# Patient Record
Sex: FEMALE | Race: WHITE | NOT HISPANIC OR LATINO | URBAN - METROPOLITAN AREA
[De-identification: names, ages, dates, MRNs, and addresses within clinical notes are randomized per-mention and may not be internally consistent; named-entity substitution may affect disease eponyms.]

---

## 2017-01-11 ENCOUNTER — EMERGENCY (EMERGENCY)
Facility: HOSPITAL | Age: 32
LOS: 1 days | Discharge: HOME | End: 2017-01-11
Admitting: EMERGENCY MEDICINE
Payer: MEDICARE

## 2017-01-11 DIAGNOSIS — F10.129 ALCOHOL ABUSE WITH INTOXICATION, UNSPECIFIED: ICD-10-CM
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
## 2017-01-11 DIAGNOSIS — F17.200 NICOTINE DEPENDENCE, UNSPECIFIED, UNCOMPLICATED: ICD-10-CM

## 2017-01-11 PROCEDURE — 99284 EMERGENCY DEPT VISIT MOD MDM: CPT

## 2017-01-12 ENCOUNTER — INPATIENT (INPATIENT)
Facility: HOSPITAL | Age: 32
LOS: 10 days | Discharge: HOME | End: 2017-01-23
Attending: PSYCHIATRY & NEUROLOGY | Admitting: PSYCHIATRY & NEUROLOGY
Payer: MEDICARE

## 2017-01-12 VITALS
HEART RATE: 99 BPM | TEMPERATURE: 98 F | RESPIRATION RATE: 14 BRPM | DIASTOLIC BLOOD PRESSURE: 62 MMHG | OXYGEN SATURATION: 99 % | SYSTOLIC BLOOD PRESSURE: 100 MMHG

## 2017-01-12 NOTE — ED ADULT TRIAGE NOTE - CHIEF COMPLAINT QUOTE
tiagoa from [place] terminal found intoxicated and unresponsive , pt discharged from [place] ER today, arousable to tactile stimuli pmh- depression, [facility] , pt discharged from [place] ER today, arousable to tactile stimuli pmh- depression, etoh abuse fingerstick 93

## 2017-01-12 NOTE — ED ADULT NURSE NOTE - CHIEF COMPLAINT QUOTE
[facility] , pt discharged from [place] ER today, arousable to tactile stimuli pmh- depression, etoh abuse fingerstick 93

## 2017-01-12 NOTE — ED PROVIDER NOTE - OBJECTIVE STATEMENT
32 yo F w/ hx of ETOH abuse and seizure disorder BIBEMS after being found outside 30 yo F w/ hx of ETOH abuse and seizure disorder BIBEMS [place] Patient was unresponsive initially, in ER now awake and alert. Reports she is suicidal, wants has had plans to run in front of traffic. Currently homeless

## 2017-01-12 NOTE — ED PROVIDER NOTE - ATTENDING CONTRIBUTION TO CARE
Attending note:   After face to face evaluation of this patient, I concur with above noted hx, pe, and care plan for this patient. +Etoh chronic abuse, seizure disorder, homeless 32 y/o F here with shaking and request for detox.   No meds for days.    Evaluation in progress

## 2017-01-12 NOTE — ED PROVIDER NOTE - PROGRESS NOTE DETAILS
Patient alert and awake, oriented x 3. Tolerated PO, ambulatory. Medically cleared. Still feels suicidal. Will send to .

## 2017-01-12 NOTE — ED ADULT NURSE REASSESSMENT NOTE - NS ED NURSE REASSESS COMMENT FT1
Pt respirations even and unlabored.  Will continue to monitor.
Pt awaiting assessment room to be readied.  Pt respirations even and unlabored.  Pt belongings placed in a large blue bag by PCA and placed at back of stretcher.  Will continue to monitor.

## 2017-01-12 NOTE — ED ADULT NURSE NOTE - OBJECTIVE STATEMENT
Pt received to room 11 on stretcher. Pt is sleeping, easily aroused with verbal stimuli, appears intoxicated, agitated, and anxious, breathing with ease on room air. Pt is 30 YO female with h/o PTSD, depression, ETOH abuse, and seizures c/o "not feeling good because she's hungry." Pt reports she is homeless and spends her days drinking. Last drink reported to be before she came to ER, reports drinking at least a pint of vodka/day. Reports she has a h/o seizures from alcohol withdrawal. VS recorded, comfort and safety measures provided. Pt received to room 11 on stretcher. Pt is sleeping, easily aroused with verbal stimuli, appears intoxicated, agitated, and anxious, breathing with ease on room air. Pt is 30 YO female with h/o PTSD, depression, ETOH abuse, and seizures c/o "not feeling good because she's hungry." Pt reports she is homeless and spends her days drinking. Last drink reported to be before she came to ER, reports drinking at least a pint of vodka/day. Reports SI. Reports she has a h/o seizures from alcohol withdrawal. Came in with IV access in right hand. VS recorded, comfort and safety measures provided.

## 2017-01-13 DIAGNOSIS — F33.9 MAJOR DEPRESSIVE DISORDER, RECURRENT, UNSPECIFIED: ICD-10-CM

## 2017-01-13 LAB
ALBUMIN SERPL ELPH-MCNC: 3.6 G/DL — SIGNIFICANT CHANGE UP (ref 3.3–5)
ALP SERPL-CCNC: 52 U/L — SIGNIFICANT CHANGE UP (ref 40–120)
ALT FLD-CCNC: 19 U/L — SIGNIFICANT CHANGE UP (ref 4–33)
AMPHET UR-MCNC: NEGATIVE — SIGNIFICANT CHANGE UP
APAP SERPL-MCNC: < 15 UG/ML — LOW (ref 15–25)
AST SERPL-CCNC: 24 U/L — SIGNIFICANT CHANGE UP (ref 4–32)
BARBITURATES MEASUREMENT: NEGATIVE — SIGNIFICANT CHANGE UP
BARBITURATES UR SCN-MCNC: NEGATIVE — SIGNIFICANT CHANGE UP
BASOPHILS # BLD AUTO: 0.02 K/UL — SIGNIFICANT CHANGE UP (ref 0–0.2)
BASOPHILS NFR BLD AUTO: 0.4 % — SIGNIFICANT CHANGE UP (ref 0–2)
BENZODIAZ SERPL-MCNC: NEGATIVE — SIGNIFICANT CHANGE UP
BENZODIAZ UR-MCNC: NEGATIVE — SIGNIFICANT CHANGE UP
BILIRUB SERPL-MCNC: < 0.2 MG/DL — LOW (ref 0.2–1.2)
BUN SERPL-MCNC: 14 MG/DL — SIGNIFICANT CHANGE UP (ref 7–23)
CALCIUM SERPL-MCNC: 7.6 MG/DL — LOW (ref 8.4–10.5)
CANNABINOIDS UR-MCNC: NEGATIVE — SIGNIFICANT CHANGE UP
CHLORIDE SERPL-SCNC: 107 MMOL/L — SIGNIFICANT CHANGE UP (ref 98–107)
CO2 SERPL-SCNC: 22 MMOL/L — SIGNIFICANT CHANGE UP (ref 22–31)
COCAINE METAB.OTHER UR-MCNC: NEGATIVE — SIGNIFICANT CHANGE UP
CREAT SERPL-MCNC: 0.44 MG/DL — LOW (ref 0.5–1.3)
EOSINOPHIL # BLD AUTO: 0.2 K/UL — SIGNIFICANT CHANGE UP (ref 0–0.5)
EOSINOPHIL NFR BLD AUTO: 3.7 % — SIGNIFICANT CHANGE UP (ref 0–6)
ETHANOL BLD-MCNC: 214 MG/DL — HIGH
GLUCOSE SERPL-MCNC: 85 MG/DL — SIGNIFICANT CHANGE UP (ref 70–99)
HCG SERPL-ACNC: < 5 MIU/ML — SIGNIFICANT CHANGE UP
HCT VFR BLD CALC: 32.3 % — LOW (ref 34.5–45)
HGB BLD-MCNC: 9.9 G/DL — LOW (ref 11.5–15.5)
IMM GRANULOCYTES NFR BLD AUTO: 0.2 % — SIGNIFICANT CHANGE UP (ref 0–1.5)
LYMPHOCYTES # BLD AUTO: 2.74 K/UL — SIGNIFICANT CHANGE UP (ref 1–3.3)
LYMPHOCYTES # BLD AUTO: 50.7 % — HIGH (ref 13–44)
MCHC RBC-ENTMCNC: 27.5 PG — SIGNIFICANT CHANGE UP (ref 27–34)
MCHC RBC-ENTMCNC: 30.7 % — LOW (ref 32–36)
MCV RBC AUTO: 89.7 FL — SIGNIFICANT CHANGE UP (ref 80–100)
METHADONE UR-MCNC: NEGATIVE — SIGNIFICANT CHANGE UP
MONOCYTES # BLD AUTO: 0.5 K/UL — SIGNIFICANT CHANGE UP (ref 0–0.9)
MONOCYTES NFR BLD AUTO: 9.3 % — SIGNIFICANT CHANGE UP (ref 2–14)
NEUTROPHILS # BLD AUTO: 1.93 K/UL — SIGNIFICANT CHANGE UP (ref 1.8–7.4)
NEUTROPHILS NFR BLD AUTO: 35.7 % — LOW (ref 43–77)
OPIATES UR-MCNC: NEGATIVE — SIGNIFICANT CHANGE UP
OXYCODONE UR-MCNC: NEGATIVE — SIGNIFICANT CHANGE UP
PCP UR-MCNC: NEGATIVE — SIGNIFICANT CHANGE UP
PLATELET # BLD AUTO: 159 K/UL — SIGNIFICANT CHANGE UP (ref 150–400)
PMV BLD: 9.6 FL — SIGNIFICANT CHANGE UP (ref 7–13)
POTASSIUM SERPL-MCNC: 3.6 MMOL/L — SIGNIFICANT CHANGE UP (ref 3.5–5.3)
POTASSIUM SERPL-SCNC: 3.6 MMOL/L — SIGNIFICANT CHANGE UP (ref 3.5–5.3)
PROT SERPL-MCNC: 5.9 G/DL — LOW (ref 6–8.3)
RBC # BLD: 3.6 M/UL — LOW (ref 3.8–5.2)
RBC # FLD: 21.6 % — HIGH (ref 10.3–14.5)
SALICYLATES SERPL-MCNC: < 5 MG/DL — LOW (ref 15–30)
SODIUM SERPL-SCNC: 147 MMOL/L — HIGH (ref 135–145)
WBC # BLD: 5.4 K/UL — SIGNIFICANT CHANGE UP (ref 3.8–10.5)
WBC # FLD AUTO: 5.4 K/UL — SIGNIFICANT CHANGE UP (ref 3.8–10.5)

## 2017-01-13 PROCEDURE — 99285 EMERGENCY DEPT VISIT HI MDM: CPT

## 2017-01-13 PROCEDURE — 70450 CT HEAD/BRAIN W/O DYE: CPT | Mod: 26

## 2017-01-13 RX ORDER — HYDROXYZINE HCL 10 MG
50 TABLET ORAL EVERY 6 HOURS
Qty: 0 | Refills: 0 | Status: DISCONTINUED | OUTPATIENT
Start: 2017-01-13 | End: 2017-01-16

## 2017-01-13 RX ORDER — TOPIRAMATE 25 MG
100 TABLET ORAL
Qty: 0 | Refills: 0 | Status: DISCONTINUED | OUTPATIENT
Start: 2017-01-13 | End: 2017-01-17

## 2017-01-13 RX ORDER — TRAZODONE HCL 50 MG
100 TABLET ORAL AT BEDTIME
Qty: 0 | Refills: 0 | Status: DISCONTINUED | OUTPATIENT
Start: 2017-01-13 | End: 2017-01-23

## 2017-01-13 RX ORDER — GABAPENTIN 400 MG/1
400 CAPSULE ORAL THREE TIMES A DAY
Qty: 0 | Refills: 0 | Status: DISCONTINUED | OUTPATIENT
Start: 2017-01-13 | End: 2017-01-23

## 2017-01-13 RX ORDER — SODIUM CHLORIDE 9 MG/ML
1000 INJECTION INTRAMUSCULAR; INTRAVENOUS; SUBCUTANEOUS ONCE
Qty: 0 | Refills: 0 | Status: COMPLETED | OUTPATIENT
Start: 2017-01-13 | End: 2017-01-13

## 2017-01-13 RX ORDER — DIPHENHYDRAMINE HCL 50 MG
50 CAPSULE ORAL ONCE
Qty: 0 | Refills: 0 | Status: DISCONTINUED | OUTPATIENT
Start: 2017-01-13 | End: 2017-01-23

## 2017-01-13 RX ORDER — NICOTINE POLACRILEX 2 MG
1 GUM BUCCAL DAILY
Qty: 0 | Refills: 0 | Status: DISCONTINUED | OUTPATIENT
Start: 2017-01-13 | End: 2017-01-23

## 2017-01-13 RX ORDER — HALOPERIDOL DECANOATE 100 MG/ML
5 INJECTION INTRAMUSCULAR EVERY 6 HOURS
Qty: 0 | Refills: 0 | Status: DISCONTINUED | OUTPATIENT
Start: 2017-01-13 | End: 2017-01-23

## 2017-01-13 RX ORDER — VENLAFAXINE HCL 75 MG
300 CAPSULE, EXT RELEASE 24 HR ORAL AT BEDTIME
Qty: 0 | Refills: 0 | Status: DISCONTINUED | OUTPATIENT
Start: 2017-01-13 | End: 2017-01-16

## 2017-01-13 RX ORDER — HALOPERIDOL DECANOATE 100 MG/ML
5 INJECTION INTRAMUSCULAR ONCE
Qty: 0 | Refills: 0 | Status: DISCONTINUED | OUTPATIENT
Start: 2017-01-13 | End: 2017-01-23

## 2017-01-13 RX ORDER — NICOTINE POLACRILEX 2 MG
1 GUM BUCCAL ONCE
Qty: 0 | Refills: 0 | Status: COMPLETED | OUTPATIENT
Start: 2017-01-13 | End: 2017-01-13

## 2017-01-13 RX ORDER — TOPIRAMATE 25 MG
1 TABLET ORAL
Qty: 0 | Refills: 0 | COMMUNITY

## 2017-01-13 RX ORDER — DIPHENHYDRAMINE HCL 50 MG
50 CAPSULE ORAL EVERY 6 HOURS
Qty: 0 | Refills: 0 | Status: DISCONTINUED | OUTPATIENT
Start: 2017-01-13 | End: 2017-01-23

## 2017-01-13 RX ORDER — NICOTINE POLACRILEX 2 MG
2 GUM BUCCAL
Qty: 0 | Refills: 0 | Status: DISCONTINUED | OUTPATIENT
Start: 2017-01-13 | End: 2017-01-23

## 2017-01-13 RX ADMIN — Medication 50 MILLIGRAM(S): at 14:03

## 2017-01-13 RX ADMIN — SODIUM CHLORIDE 1000 MILLILITER(S): 9 INJECTION INTRAMUSCULAR; INTRAVENOUS; SUBCUTANEOUS at 04:08

## 2017-01-13 RX ADMIN — Medication 100 MILLIGRAM(S): at 20:39

## 2017-01-13 RX ADMIN — Medication 50 MILLIGRAM(S): at 06:46

## 2017-01-13 RX ADMIN — HALOPERIDOL DECANOATE 5 MILLIGRAM(S): 100 INJECTION INTRAMUSCULAR at 14:36

## 2017-01-13 RX ADMIN — Medication 2 MILLIGRAM(S): at 10:54

## 2017-01-13 RX ADMIN — Medication 1 PATCH: at 13:15

## 2017-01-13 RX ADMIN — Medication 50 MILLIGRAM(S): at 20:39

## 2017-01-13 RX ADMIN — Medication 300 MILLIGRAM(S): at 20:39

## 2017-01-13 RX ADMIN — GABAPENTIN 400 MILLIGRAM(S): 400 CAPSULE ORAL at 20:39

## 2017-01-13 NOTE — ED BEHAVIORAL HEALTH ASSESSMENT NOTE - DIFFERENTIAL
Depression  Borderline PD   Alcohol use disorder  post-traumatic stress disorder  Substance induced mood disorder Substance induced mood disorder

## 2017-01-13 NOTE — ED BEHAVIORAL HEALTH ASSESSMENT NOTE - SUBSTANCE ISSUES AND PLAN (INCLUDE STANDING AND PRN MEDICATION)
Ativan taper symptom trigger CIWA; seizure precautions symptom trigger CIWA; seizure precautions; nicotine replacement

## 2017-01-13 NOTE — ED BEHAVIORAL HEALTH ASSESSMENT NOTE - HPI (INCLUDE ILLNESS QUALITY, SEVERITY, DURATION, TIMING, CONTEXT, MODIFYING FACTORS, ASSOCIATED SIGNS AND SYMPTOMS)
This is a 31 year old single, unemployed female currently homeless and previously domiciled for the last four months at [facility], can't return there because of violations. PPH MDD, borderline personality disorder, post-traumatic stress disorder, anorexia, alcohol use disorder, opioid abuse in remission. Hx of multiple inpatient hospitalizations, > 10 last at [facility] December 2016. Hx of one prior suicide attempt 1 year ago, by cutting her wrists. She denies history of aggression/violence or current legal issues. Currently abusing Alcohol and history of multiple detox/rehab > 4, history of withdrawal seizures & DTs. She denies PMH. BIBA for suicidal ideation and ETOH intox.     Upon arrival to the ER patient's BAL was 214. She was given Librium 50mg at 06:34AM and later Ativan 2mg PO at 10:00 to prevent withdrawal symptoms. Patient was interviewed when sober.     Patient reports she came to the ER because she was trying to kill herself by walking into traffic. She was intoxicated and later blacked out and cannot remember who called 911. She stated she has been having active suicidal ideation to walk into traffic and if discharged she will kill herself. She stated precipitating factors include her inability to get sober and stop drinking as well as flashbacks from PTSD of incest. Patient endorses feeling depressed, hopeless, worthless, guilty, insomnia, poor appetite, anxiety, panic attacks, low energy and poor concentration. Patient denies HI/SIB, hypomanic or manic symptoms, AH/VH, paranoia, IOR or any other mental health issues. Patient is a 31 year old single, unemployed female currently homeless and previously domiciled for the last four months at [facility], can't return there because of violations. PPH MDD, borderline personality disorder, post-traumatic stress disorder, anorexia, alcohol use disorder, opioid abuse in remission. Hx of multiple inpatient hospitalizations, > 10 last at [facility] December 2016. Hx of one prior suicide attempt 1 year ago, by cutting her wrists. She denies history of aggression/violence or current legal issues. Currently abusing Alcohol and history of multiple detox/rehab > 4, history of withdrawal seizures & DTs. She denies PMH. BIBA for suicidal ideation and ETOH intox.     Upon arrival to the ER patient's BAL was 214. She was given Librium 50mg at 06:34AM and later Ativan 2mg PO at 10:00 to prevent withdrawal symptoms. Patient was interviewed when sober.     Patient reports she came to the ER because she was trying to kill herself by walking into traffic. She was intoxicated and later blacked out and cannot remember who called 911. She stated she has been having active suicidal ideation to walk into traffic and if discharged she will kill herself. She stated precipitating factors include her inability to get sober and stop drinking as well as flashbacks from PTSD of incest. Patient endorses feeling depressed, hopeless, worthless, guilty, insomnia, poor appetite, anxiety, panic attacks, low energy and poor concentration. Patient denies HI/SIB, hypomanic or manic symptoms, AH/VH, paranoia, IOR or any other mental health issues.    See  note for collateral information. Patient is a 31 year old single, unemployed female currently homeless and previously domiciled for the last four months at [facility], can't return there because of violations. PPH MDD, borderline personality disorder, post-traumatic stress disorder, anorexia, alcohol use disorder, opioid abuse in remission. Hx of multiple inpatient hospitalizations, > 10 last at [facility] December 2016. Hx of one prior suicide attempt 1 year ago, by cutting her wrists. She denies history of aggression/violence or current legal issues. Currently abusing Alcohol and history of multiple detox/rehab > 4, reported history of withdrawal seizures. She denies PMH. BIBA for suicidal ideation and ETOH intox.     Upon arrival to the ER patient's BAL was 214. She was given Librium 50mg at 06:34AM and later Ativan 2mg PO at 10:00 to prevent withdrawal symptoms. Patient was interviewed when sober.     Patient reports she came to the ER because she was trying to kill herself by walking into traffic. She was intoxicated and later blacked out and cannot remember who called 911. She stated she has been having active suicidal ideation to walk into traffic and if discharged she will kill herself. She stated precipitating factors include her inability to get sober and stop drinking as well as flashbacks from PTSD of incest. Patient endorses feeling depressed, hopeless, worthless, guilty, insomnia, poor appetite, anxiety, panic attacks, low energy and poor concentration. Patient denies HI/SIB, hypomanic or manic symptoms, AH/VH, paranoia, IOR or any other mental health issues.    See  note for collateral information.

## 2017-01-13 NOTE — ED BEHAVIORAL HEALTH ASSESSMENT NOTE - DETAILS
History of cutting History or withdrawal seizures physical, sexual, emotional message left for Dr Cervantes at 14:50 patient self-referred reported allergies to prozac, depakote, wellbutrin and zoloft picked up at bus terminal History or withdrawal seizures & DTs per patient Dr. Pitt History or withdrawal seizures per patient Dr. Pitt @13:05

## 2017-01-13 NOTE — ED BEHAVIORAL HEALTH NOTE - BEHAVIORAL HEALTH NOTE
Spoke with Dr. Cervantes ([phone])- He reports patient does not like her sober house and wants to find housing. Last time patient demanded to be discharged because they would not transfer her to [facility] . He said patient has a lot of secondary gain but certainly is not well mentally. He stated [facility] knows patient well and suggested speaking to them. Spoke with Dr. Cervantes ([phone])- He reports patient does not like her sober house and wants to find housing. Last time patient demanded to be discharged because they would not transfer her to  . He said patient has a lot of secondary gain but certainly is not well mentally. He stated [facility] knows patient well and suggested speaking to them.    Spoke with Zehra Aguilar ([phone]) - She reports last time Dr. Vail said patient could not be admitted to . Patient is likely malingering  because usually when admitted there is not much going on with her symptomology and she consistently presented euthymic. Patient does not want treatment and does not want inpatient rehab and likely just comes into the hospital because she needs a place to stay. When patient was on the unit she would lie and told the team she would do after care but obviously did not follow through. She would not recommend patient being admitted to 2 as this is a not a therapeutic environment for her. Spoke with Dr. Cervantes ([phone])- He reports patient does not like her sober house and wants to find housing. Last time patient demanded to be discharged because they would not transfer her to  . He said patient has a lot of secondary gain but certainly is not well mentally. He stated [facility] knows patient well and suggested speaking to them.    Spoke with Zehra Aguilar ([phone]) - She reports last time Dr. Vail said patient could not be admitted to . Patient is likely malingering  because usually when admitted there is not much going on with her symptomology and she consistently presented euthymic. Patient does not want treatment and does not want inpatient rehab and likely just comes into the hospital because she needs a place to stay. When patient was on the unit she would lie and told the team she would do after care but obviously did not follow through. She would not recommend patient being admitted to 2 as this is a not a therapeutic environment for her.    Spoke with Manuel Murphy Therapist at [facility] [phone]- She reports patient was discharged on 12/9/16. After she was discharged from [facility] 12/9/16 she continued to drink. They could no longer manage her in outpatient setting and suggested she seek a higher level of care such as residential.

## 2017-01-13 NOTE — ED BEHAVIORAL HEALTH ASSESSMENT NOTE - OTHER PAST PSYCHIATRIC HISTORY (INCLUDE DETAILS REGARDING ONSET, COURSE OF ILLNESS, INPATIENT/OUTPATIENT TREATMENT)
Per record, history of 13 admissions, last here in [facility] in February of this year. History of suicide attempt via cutting per patient a few years ago. Currently in outpatient treatment through [facility] with psychiatrist who she sees once a month and therapist sees weekly. PPH MDD, borderline personality disorder, post-traumatic stress disorder, anorexia, alcohol use disorder, opioid abuse in remission. Hx of multiple inpatient hospitalizations, > 10 last at [facility] December 2016. Hx of one prior suicide attempt 1 year ago, by cutting her wrists. Recently discharged from [facility] for non compliance with substance abuse treatment.

## 2017-01-13 NOTE — ED BEHAVIORAL HEALTH ASSESSMENT NOTE - SUMMARY
This is a 31 year old single, unemployed female domiciled for the last four months at [facility], can't return there because of violations,  with prior psychiatric history of borderline personality disorder, post-traumatic stress disorder, anorexia, alcohol use disorder, opioid abuse in remission, multiple detox/reahab > 4, history of withdrawal seizures, multiple inpatient hospitalizations, > 10 last at [facility] February 2016, and again at [facility] in 11/2016, one prior suicide attempt 1 year ago, by cutting her wrists, history of trauma,  brought in by self for suicidal ideation.     Patient interviewed after she reached sobriety. Currently reports feeling depressed with suicidal ideation with plans to walk into traffic,  Reports she has relapsed over the past week and had a seizure 4 days ago and withdrawal tremors last night which is why she was drinking. Patient is requesting voluntary hospitalization for safety and stabilization. Patient is a 31 year old single, unemployed female currently homeless and previously domiciled for the last four months at [facility], can't return there because of violations. PPH MDD, borderline personality disorder, post-traumatic stress disorder, anorexia, alcohol use disorder, opioid abuse in remission. Hx of multiple inpatient hospitalizations, > 10 last at [facility] December 2016. Hx of one prior suicide attempt 1 year ago, by cutting her wrists. She denies history of aggression/violence or current legal issues. Currently abusing Alcohol and history of multiple detox/rehab > 4, history of withdrawal seizures & DTs. She denies PMH. BIBA for suicidal ideation and ETOH intox.    Patient presents to the ER in the context of suicidal behavior- attempting to walk into traffic last night in a suicide attempt while intoxicated. Upon sobriety patient continues to endorse active suicidal ideation with plan & intent to walk into traffic. She is unable to contract for safety and is impulsive and unpredictable. She presents an imminent risk to self and requires inpatient admission for safety and stabilization. Patient is a 31 year old single, unemployed female currently homeless and previously domiciled for the last four months at [facility], can't return there because of violations. PPH MDD, borderline personality disorder, post-traumatic stress disorder, anorexia, alcohol use disorder, opioid abuse in remission. Hx of multiple inpatient hospitalizations, > 10 last at [facility] December 2016. Hx of one prior suicide attempt 1 year ago, by cutting her wrists. She denies history of aggression/violence or current legal issues. Currently abusing Alcohol and history of multiple detox/rehab > 4, history of withdrawal seizures. She denies PMH. BIBA for suicidal ideation and ETOH intox.    Patient presents to the ER in the context of suicidal behavior- attempting to walk into traffic last night in a suicide attempt while intoxicated. Upon sobriety patient continues to endorse active suicidal ideation with plan & intent to walk into traffic. She is unable to contract for safety and is impulsive and unpredictable. She presents an imminent risk to self and requires inpatient admission for safety and stabilization.

## 2017-01-13 NOTE — ED BEHAVIORAL HEALTH ASSESSMENT NOTE - RISK ASSESSMENT
severe. pending homelessness, suicidal with plan to walk into traffic, trauma history, history of inpatient admissions and SIB and recent etoh relapse are risks. Protective factors include no violence history, medication compliance, no access to guns, no global insomnia.    Patient has acute risk factors and would benefit from inpatient hospitalization for safety and stabilization. Patient presents an imminent risk to self as evidence by active suicidal thoughts/plan/intent, history of suicide attempts, history of multiple inpatient admissions, significant psychosocial stressors, homelessness, no social supports, depression, hopelessness, inability to contract for safety & active substance abuse.    protective factors- no homicidal thoughts, no SIB, no aggression/violence, no legal issues, no tita, no psychotic symptoms and calm/cooperative throughout evaluation.

## 2017-01-13 NOTE — ED BEHAVIORAL HEALTH ASSESSMENT NOTE - CURRENT MEDICATION
effexor, trazodone, wellbutrin, gabapentin, topomax - exact doses need to be confirmed Effexor XR 300mg Daily, Topamax 100mg BID, Neurontin 400mg TID Effexor XR 300mg Daily, Topamax 100mg BID, Neurontin 400mg TID, Trazodone 100mg QHS    Patient reports compliance with medication

## 2017-01-13 NOTE — ED BEHAVIORAL HEALTH ASSESSMENT NOTE - SUICIDE RISK FACTORS
History of abuse/trauma/Highly impulsive behavior/Substance abuse/dependence/Mood episode/Anhedonia/Hopelessness/Access to means (pills, firearms, etc.) Access to means (pills, firearms, etc.)/History of abuse/trauma/Hopelessness/Highly impulsive behavior/Substance abuse/dependence/Agitation/severe anxiety/Global insomnia/Mood episode

## 2017-01-13 NOTE — ED BEHAVIORAL HEALTH ASSESSMENT NOTE - ACTIVATING EVENTS/STRESSORS
Recent losses/None known Pending incarceration or homelessness/Current or pending isolation/Non-compliant with treatment/Recent change in treartment/None known

## 2017-01-13 NOTE — ED BEHAVIORAL HEALTH ASSESSMENT NOTE - DESCRIPTION (FIRST USE, LAST USE, QUANTITY, FREQUENCY, DURATION)
prior history, current use needs to be further assessed Relapsed for past week, drank beer, unknown quantity after four months sober, prior drank quart vodka/day remote history; none recent 1.5 PPD history of alcohol dependence- relapsed 1 month ago and endorses drinking daily up to 1 Quart of Vodka/day

## 2017-01-13 NOTE — ED BEHAVIORAL HEALTH ASSESSMENT NOTE - PRIMARY DX
Severe episode of recurrent major depressive disorder, without psychotic features Borderline personality disorder

## 2017-01-13 NOTE — ED BEHAVIORAL HEALTH ASSESSMENT NOTE - PSYCHIATRIC ISSUES AND PLAN (INCLUDE STANDING AND PRN MEDICATION)
Continue Trazodone 50mg po qhs, Effexor  mg po qday, Neurontin 400 mg po TID, Ativan 2mg po/IM q 4 hours prn agitaiton Effexor XR 300mg Daily, Topamax 100mg BID, Neurontin 400mg TID, Haldol 5mg PO/IM PRN, Hydroxyzine HCL 50mg PRN Q6, Ativan 2mg IM, PRN, Benadryl 50mg PO/IM PRN Trazodone 100mg QHS, Effexor XR 300mg Daily, Topamax 100mg BID, Neurontin 400mg TID, Haldol 5mg PO/IM PRN, Hydroxyzine HCL 50mg PRN Q6, Ativan 2mg IM, PRN, Benadryl 50mg PO/IM PRN

## 2017-01-13 NOTE — ED BEHAVIORAL HEALTH ASSESSMENT NOTE - CASE SUMMARY
31 year old single, unemployed female currently homeless and previously domiciled for the last four months at [facility], can't return there because of violations. PPH MDD, borderline personality disorder, post-traumatic stress disorder, anorexia, alcohol use disorder, opioid abuse in remission. Hx of multiple inpatient hospitalizations, > 10 last at [facility] December 2016. Hx of one prior suicide attempt 1 year ago, by cutting her wrists. She denies history of aggression/violence or current legal issues. Currently abusing Alcohol and history of multiple detox/rehab > 4, reported history of withdrawal seizures. She denies PMH. BIBA for suicidal ideation and ETOH intox.   pt is high risk - she is suicidal, abusing alcohol, can't contract for safety, prior suicide attempts, impulsive and unpredictable.  she will be admitted for safety and stabilization.

## 2017-01-13 NOTE — ED BEHAVIORAL HEALTH ASSESSMENT NOTE - PAST PSYCHOTROPIC MEDICATION
effexor, trazodone, wellbutrin, gabapentin, topomax. Prior documentation of taking certain meds for weight loss purposes. prozac, depakote, wellbutrin, zoloft, trazodone prozac, depakote, wellbutrin, zoloft

## 2017-01-13 NOTE — ED BEHAVIORAL HEALTH ASSESSMENT NOTE - DESCRIPTION
Calm and cooperative, remained in behavioral control and did not require prn medication. none Unemployed, single, no supports, [facility], now undomiciled Upon arrival to the ER patient's BAL was 214. She was given Librium 50mg at 06:34AM and later Ativan 2mg PO at 10:00 to prevent withdrawal symptoms. During course of hospitalization patient was calm and cooperative. Patient was not aggressive or violent. see hpi

## 2017-01-13 NOTE — ED BEHAVIORAL HEALTH ASSESSMENT NOTE - AXIS IV
Occupational problems/Housing problems/Problem related to social environment/Economic problems Economic problems/Problems with access to healthcare services/Housing problems/Problem related to social environment/Occupational problems

## 2017-01-14 RX ADMIN — Medication 2 MILLIGRAM(S): at 08:20

## 2017-01-14 RX ADMIN — Medication 300 MILLIGRAM(S): at 20:33

## 2017-01-14 RX ADMIN — Medication 100 MILLIGRAM(S): at 08:20

## 2017-01-14 RX ADMIN — Medication 100 MILLIGRAM(S): at 20:33

## 2017-01-14 RX ADMIN — GABAPENTIN 400 MILLIGRAM(S): 400 CAPSULE ORAL at 20:33

## 2017-01-14 RX ADMIN — GABAPENTIN 400 MILLIGRAM(S): 400 CAPSULE ORAL at 12:38

## 2017-01-14 RX ADMIN — GABAPENTIN 400 MILLIGRAM(S): 400 CAPSULE ORAL at 08:20

## 2017-01-14 RX ADMIN — Medication 1 PATCH: at 08:20

## 2017-01-14 RX ADMIN — Medication 2 MILLIGRAM(S): at 17:07

## 2017-01-14 RX ADMIN — Medication 2 MILLIGRAM(S): at 10:41

## 2017-01-14 RX ADMIN — Medication 2 MILLIGRAM(S): at 06:13

## 2017-01-14 RX ADMIN — Medication 1 MILLIGRAM(S): at 14:26

## 2017-01-14 RX ADMIN — Medication 50 MILLIGRAM(S): at 12:38

## 2017-01-15 PROCEDURE — 99232 SBSQ HOSP IP/OBS MODERATE 35: CPT

## 2017-01-15 RX ORDER — IBUPROFEN 200 MG
400 TABLET ORAL EVERY 6 HOURS
Qty: 0 | Refills: 0 | Status: DISCONTINUED | OUTPATIENT
Start: 2017-01-15 | End: 2017-01-23

## 2017-01-15 RX ADMIN — Medication 50 MILLIGRAM(S): at 21:54

## 2017-01-15 RX ADMIN — Medication 300 MILLIGRAM(S): at 20:24

## 2017-01-15 RX ADMIN — GABAPENTIN 400 MILLIGRAM(S): 400 CAPSULE ORAL at 20:24

## 2017-01-15 RX ADMIN — Medication 100 MILLIGRAM(S): at 08:12

## 2017-01-15 RX ADMIN — Medication 50 MILLIGRAM(S): at 02:00

## 2017-01-15 RX ADMIN — Medication 400 MILLIGRAM(S): at 12:33

## 2017-01-15 RX ADMIN — Medication 1 PATCH: at 08:11

## 2017-01-15 RX ADMIN — Medication 100 MILLIGRAM(S): at 20:24

## 2017-01-15 RX ADMIN — Medication 1 PATCH: at 08:12

## 2017-01-15 RX ADMIN — Medication 50 MILLIGRAM(S): at 20:24

## 2017-01-15 RX ADMIN — GABAPENTIN 400 MILLIGRAM(S): 400 CAPSULE ORAL at 08:11

## 2017-01-15 RX ADMIN — Medication 400 MILLIGRAM(S): at 10:44

## 2017-01-15 RX ADMIN — GABAPENTIN 400 MILLIGRAM(S): 400 CAPSULE ORAL at 13:03

## 2017-01-15 RX ADMIN — HALOPERIDOL DECANOATE 5 MILLIGRAM(S): 100 INJECTION INTRAMUSCULAR at 08:12

## 2017-01-15 RX ADMIN — HALOPERIDOL DECANOATE 5 MILLIGRAM(S): 100 INJECTION INTRAMUSCULAR at 14:13

## 2017-01-16 PROCEDURE — 99232 SBSQ HOSP IP/OBS MODERATE 35: CPT

## 2017-01-16 RX ORDER — VENLAFAXINE HCL 75 MG
300 CAPSULE, EXT RELEASE 24 HR ORAL DAILY
Qty: 0 | Refills: 0 | Status: DISCONTINUED | OUTPATIENT
Start: 2017-01-16 | End: 2017-01-23

## 2017-01-16 RX ORDER — QUETIAPINE FUMARATE 200 MG/1
25 TABLET, FILM COATED ORAL EVERY 6 HOURS
Qty: 0 | Refills: 0 | Status: DISCONTINUED | OUTPATIENT
Start: 2017-01-16 | End: 2017-01-23

## 2017-01-16 RX ADMIN — Medication 1 PATCH: at 08:02

## 2017-01-16 RX ADMIN — GABAPENTIN 400 MILLIGRAM(S): 400 CAPSULE ORAL at 12:47

## 2017-01-16 RX ADMIN — Medication 400 MILLIGRAM(S): at 04:52

## 2017-01-16 RX ADMIN — Medication 50 MILLIGRAM(S): at 03:43

## 2017-01-16 RX ADMIN — GABAPENTIN 400 MILLIGRAM(S): 400 CAPSULE ORAL at 08:02

## 2017-01-16 RX ADMIN — Medication 100 MILLIGRAM(S): at 08:02

## 2017-01-16 RX ADMIN — Medication 50 MILLIGRAM(S): at 10:27

## 2017-01-16 RX ADMIN — GABAPENTIN 400 MILLIGRAM(S): 400 CAPSULE ORAL at 20:36

## 2017-01-16 RX ADMIN — QUETIAPINE FUMARATE 25 MILLIGRAM(S): 200 TABLET, FILM COATED ORAL at 16:43

## 2017-01-16 RX ADMIN — Medication 400 MILLIGRAM(S): at 20:36

## 2017-01-16 RX ADMIN — Medication 400 MILLIGRAM(S): at 13:15

## 2017-01-16 RX ADMIN — Medication 400 MILLIGRAM(S): at 18:55

## 2017-01-16 RX ADMIN — Medication 400 MILLIGRAM(S): at 12:46

## 2017-01-16 RX ADMIN — Medication 1 PATCH: at 08:08

## 2017-01-16 RX ADMIN — Medication 100 MILLIGRAM(S): at 20:36

## 2017-01-17 PROCEDURE — 96361 HYDRATE IV INFUSION ADD-ON: CPT

## 2017-01-17 PROCEDURE — 80048 BASIC METABOLIC PNL TOTAL CA: CPT

## 2017-01-17 PROCEDURE — 82962 GLUCOSE BLOOD TEST: CPT

## 2017-01-17 PROCEDURE — 99232 SBSQ HOSP IP/OBS MODERATE 35: CPT

## 2017-01-17 PROCEDURE — 96374 THER/PROPH/DIAG INJ IV PUSH: CPT

## 2017-01-17 PROCEDURE — 80307 DRUG TEST PRSMV CHEM ANLYZR: CPT

## 2017-01-17 PROCEDURE — 85014 HEMATOCRIT: CPT

## 2017-01-17 PROCEDURE — 84132 ASSAY OF SERUM POTASSIUM: CPT

## 2017-01-17 PROCEDURE — 85027 COMPLETE CBC AUTOMATED: CPT

## 2017-01-17 PROCEDURE — 99285 EMERGENCY DEPT VISIT HI MDM: CPT | Mod: 25

## 2017-01-17 PROCEDURE — 81025 URINE PREGNANCY TEST: CPT

## 2017-01-17 RX ORDER — TOPIRAMATE 25 MG
150 TABLET ORAL
Qty: 0 | Refills: 0 | Status: DISCONTINUED | OUTPATIENT
Start: 2017-01-17 | End: 2017-01-23

## 2017-01-17 RX ADMIN — Medication 150 MILLIGRAM(S): at 21:43

## 2017-01-17 RX ADMIN — QUETIAPINE FUMARATE 25 MILLIGRAM(S): 200 TABLET, FILM COATED ORAL at 08:57

## 2017-01-17 RX ADMIN — Medication 400 MILLIGRAM(S): at 23:27

## 2017-01-17 RX ADMIN — Medication 400 MILLIGRAM(S): at 06:57

## 2017-01-17 RX ADMIN — Medication 1 PATCH: at 08:56

## 2017-01-17 RX ADMIN — QUETIAPINE FUMARATE 25 MILLIGRAM(S): 200 TABLET, FILM COATED ORAL at 01:10

## 2017-01-17 RX ADMIN — Medication 100 MILLIGRAM(S): at 08:57

## 2017-01-17 RX ADMIN — Medication 400 MILLIGRAM(S): at 13:23

## 2017-01-17 RX ADMIN — GABAPENTIN 400 MILLIGRAM(S): 400 CAPSULE ORAL at 21:43

## 2017-01-17 RX ADMIN — GABAPENTIN 400 MILLIGRAM(S): 400 CAPSULE ORAL at 08:56

## 2017-01-17 RX ADMIN — Medication 400 MILLIGRAM(S): at 08:57

## 2017-01-17 RX ADMIN — GABAPENTIN 400 MILLIGRAM(S): 400 CAPSULE ORAL at 13:22

## 2017-01-17 RX ADMIN — Medication 300 MILLIGRAM(S): at 08:57

## 2017-01-17 RX ADMIN — Medication 100 MILLIGRAM(S): at 21:43

## 2017-01-17 RX ADMIN — Medication 1 PATCH: at 08:57

## 2017-01-18 PROCEDURE — 99232 SBSQ HOSP IP/OBS MODERATE 35: CPT

## 2017-01-18 RX ORDER — TUBERCULIN PURIFIED PROTEIN DERIVATIVE 5 [IU]/.1ML
5 INJECTION, SOLUTION INTRADERMAL ONCE
Qty: 0 | Refills: 0 | Status: COMPLETED | OUTPATIENT
Start: 2017-01-18 | End: 2017-01-18

## 2017-01-18 RX ORDER — IBUPROFEN 200 MG
400 TABLET ORAL EVERY 6 HOURS
Qty: 0 | Refills: 0 | Status: DISCONTINUED | OUTPATIENT
Start: 2017-01-18 | End: 2017-01-23

## 2017-01-18 RX ADMIN — Medication 400 MILLIGRAM(S): at 16:34

## 2017-01-18 RX ADMIN — Medication 400 MILLIGRAM(S): at 22:35

## 2017-01-18 RX ADMIN — Medication 1 PATCH: at 08:06

## 2017-01-18 RX ADMIN — TUBERCULIN PURIFIED PROTEIN DERIVATIVE 5 UNIT(S): 5 INJECTION, SOLUTION INTRADERMAL at 13:30

## 2017-01-18 RX ADMIN — Medication 300 MILLIGRAM(S): at 08:22

## 2017-01-18 RX ADMIN — Medication 150 MILLIGRAM(S): at 08:22

## 2017-01-18 RX ADMIN — Medication 100 MILLIGRAM(S): at 22:35

## 2017-01-18 RX ADMIN — Medication 1 PATCH: at 08:22

## 2017-01-18 RX ADMIN — Medication 400 MILLIGRAM(S): at 14:15

## 2017-01-18 RX ADMIN — Medication 400 MILLIGRAM(S): at 00:43

## 2017-01-18 RX ADMIN — GABAPENTIN 400 MILLIGRAM(S): 400 CAPSULE ORAL at 13:36

## 2017-01-18 RX ADMIN — GABAPENTIN 400 MILLIGRAM(S): 400 CAPSULE ORAL at 08:22

## 2017-01-18 RX ADMIN — Medication 150 MILLIGRAM(S): at 22:34

## 2017-01-18 RX ADMIN — GABAPENTIN 400 MILLIGRAM(S): 400 CAPSULE ORAL at 22:34

## 2017-01-19 PROCEDURE — 99232 SBSQ HOSP IP/OBS MODERATE 35: CPT

## 2017-01-19 RX ADMIN — Medication 150 MILLIGRAM(S): at 08:37

## 2017-01-19 RX ADMIN — Medication 1 PATCH: at 08:37

## 2017-01-19 RX ADMIN — Medication 400 MILLIGRAM(S): at 20:55

## 2017-01-19 RX ADMIN — GABAPENTIN 400 MILLIGRAM(S): 400 CAPSULE ORAL at 12:36

## 2017-01-19 RX ADMIN — Medication 400 MILLIGRAM(S): at 12:36

## 2017-01-19 RX ADMIN — Medication 400 MILLIGRAM(S): at 05:17

## 2017-01-19 RX ADMIN — Medication 150 MILLIGRAM(S): at 20:55

## 2017-01-19 RX ADMIN — Medication 300 MILLIGRAM(S): at 08:37

## 2017-01-19 RX ADMIN — GABAPENTIN 400 MILLIGRAM(S): 400 CAPSULE ORAL at 08:37

## 2017-01-19 RX ADMIN — GABAPENTIN 400 MILLIGRAM(S): 400 CAPSULE ORAL at 20:55

## 2017-01-19 RX ADMIN — Medication 100 MILLIGRAM(S): at 20:55

## 2017-01-20 PROCEDURE — 99232 SBSQ HOSP IP/OBS MODERATE 35: CPT

## 2017-01-20 RX ORDER — IBUPROFEN 200 MG
400 TABLET ORAL ONCE
Qty: 0 | Refills: 0 | Status: COMPLETED | OUTPATIENT
Start: 2017-01-20 | End: 2017-01-20

## 2017-01-20 RX ADMIN — Medication 300 MILLIGRAM(S): at 09:26

## 2017-01-20 RX ADMIN — Medication 1 PATCH: at 09:26

## 2017-01-20 RX ADMIN — Medication 400 MILLIGRAM(S): at 09:26

## 2017-01-20 RX ADMIN — GABAPENTIN 400 MILLIGRAM(S): 400 CAPSULE ORAL at 09:26

## 2017-01-20 RX ADMIN — Medication 150 MILLIGRAM(S): at 21:03

## 2017-01-20 RX ADMIN — Medication 400 MILLIGRAM(S): at 22:58

## 2017-01-20 RX ADMIN — GABAPENTIN 400 MILLIGRAM(S): 400 CAPSULE ORAL at 13:57

## 2017-01-20 RX ADMIN — Medication 400 MILLIGRAM(S): at 04:20

## 2017-01-20 RX ADMIN — Medication 150 MILLIGRAM(S): at 09:26

## 2017-01-20 RX ADMIN — GABAPENTIN 400 MILLIGRAM(S): 400 CAPSULE ORAL at 21:03

## 2017-01-21 RX ADMIN — GABAPENTIN 400 MILLIGRAM(S): 400 CAPSULE ORAL at 20:46

## 2017-01-21 RX ADMIN — Medication 400 MILLIGRAM(S): at 03:34

## 2017-01-21 RX ADMIN — Medication 300 MILLIGRAM(S): at 09:08

## 2017-01-21 RX ADMIN — Medication 400 MILLIGRAM(S): at 09:00

## 2017-01-21 RX ADMIN — Medication 150 MILLIGRAM(S): at 09:08

## 2017-01-21 RX ADMIN — GABAPENTIN 400 MILLIGRAM(S): 400 CAPSULE ORAL at 09:07

## 2017-01-21 RX ADMIN — TUBERCULIN PURIFIED PROTEIN DERIVATIVE 5 UNIT(S): 5 INJECTION, SOLUTION INTRADERMAL at 07:15

## 2017-01-21 RX ADMIN — Medication 400 MILLIGRAM(S): at 18:05

## 2017-01-21 RX ADMIN — Medication 1 PATCH: at 10:36

## 2017-01-21 RX ADMIN — Medication 150 MILLIGRAM(S): at 20:46

## 2017-01-21 RX ADMIN — Medication 100 MILLIGRAM(S): at 10:52

## 2017-01-21 RX ADMIN — GABAPENTIN 400 MILLIGRAM(S): 400 CAPSULE ORAL at 13:48

## 2017-01-22 RX ADMIN — GABAPENTIN 400 MILLIGRAM(S): 400 CAPSULE ORAL at 08:27

## 2017-01-22 RX ADMIN — Medication 1 PATCH: at 12:52

## 2017-01-22 RX ADMIN — Medication 100 MILLIGRAM(S): at 20:45

## 2017-01-22 RX ADMIN — GABAPENTIN 400 MILLIGRAM(S): 400 CAPSULE ORAL at 12:52

## 2017-01-22 RX ADMIN — Medication 300 MILLIGRAM(S): at 08:27

## 2017-01-22 RX ADMIN — Medication 150 MILLIGRAM(S): at 08:27

## 2017-01-22 RX ADMIN — Medication 150 MILLIGRAM(S): at 20:45

## 2017-01-22 RX ADMIN — GABAPENTIN 400 MILLIGRAM(S): 400 CAPSULE ORAL at 20:45

## 2017-01-23 VITALS — TEMPERATURE: 98 F

## 2017-01-23 PROCEDURE — 99238 HOSP IP/OBS DSCHRG MGMT 30/<: CPT

## 2017-01-23 RX ORDER — TRAZODONE HCL 50 MG
1 TABLET ORAL
Qty: 15 | Refills: 0 | OUTPATIENT
Start: 2017-01-23 | End: 2017-02-07

## 2017-01-23 RX ORDER — TOPIRAMATE 25 MG
3 TABLET ORAL
Qty: 90 | Refills: 0 | OUTPATIENT
Start: 2017-01-23 | End: 2017-02-07

## 2017-01-23 RX ORDER — VENLAFAXINE HCL 75 MG
2 CAPSULE, EXT RELEASE 24 HR ORAL
Qty: 30 | Refills: 0 | OUTPATIENT
Start: 2017-01-23 | End: 2017-02-07

## 2017-01-23 RX ORDER — GABAPENTIN 400 MG/1
1 CAPSULE ORAL
Qty: 45 | Refills: 0 | OUTPATIENT
Start: 2017-01-23 | End: 2017-02-07

## 2017-01-23 RX ADMIN — Medication 400 MILLIGRAM(S): at 12:33

## 2017-01-23 RX ADMIN — Medication 400 MILLIGRAM(S): at 12:00

## 2017-01-23 RX ADMIN — Medication 1 PATCH: at 09:30

## 2017-01-23 RX ADMIN — GABAPENTIN 400 MILLIGRAM(S): 400 CAPSULE ORAL at 12:48

## 2017-01-23 RX ADMIN — GABAPENTIN 400 MILLIGRAM(S): 400 CAPSULE ORAL at 09:30

## 2017-01-23 RX ADMIN — Medication 150 MILLIGRAM(S): at 09:30

## 2017-01-23 RX ADMIN — Medication 300 MILLIGRAM(S): at 09:30

## 2017-04-18 ENCOUNTER — EMERGENCY (EMERGENCY)
Facility: HOSPITAL | Age: 32
LOS: 1 days | Discharge: HOME | End: 2017-04-18
Attending: EMERGENCY MEDICINE | Admitting: EMERGENCY MEDICINE
Payer: MEDICARE

## 2017-04-18 VITALS
RESPIRATION RATE: 16 BRPM | SYSTOLIC BLOOD PRESSURE: 90 MMHG | OXYGEN SATURATION: 97 % | DIASTOLIC BLOOD PRESSURE: 55 MMHG | TEMPERATURE: 98 F | HEART RATE: 84 BPM

## 2017-04-18 LAB
ALBUMIN SERPL ELPH-MCNC: 4.2 G/DL — SIGNIFICANT CHANGE UP (ref 3.3–5)
ALP SERPL-CCNC: 51 U/L — SIGNIFICANT CHANGE UP (ref 40–120)
ALT FLD-CCNC: 16 U/L — SIGNIFICANT CHANGE UP (ref 4–33)
AMPHET UR-MCNC: NEGATIVE — SIGNIFICANT CHANGE UP
APAP SERPL-MCNC: < 15 UG/ML — LOW (ref 15–25)
APPEARANCE UR: CLEAR — SIGNIFICANT CHANGE UP
AST SERPL-CCNC: 21 U/L — SIGNIFICANT CHANGE UP (ref 4–32)
BARBITURATES MEASUREMENT: NEGATIVE — SIGNIFICANT CHANGE UP
BARBITURATES UR SCN-MCNC: NEGATIVE — SIGNIFICANT CHANGE UP
BASOPHILS # BLD AUTO: 0.04 K/UL — SIGNIFICANT CHANGE UP (ref 0–0.2)
BASOPHILS NFR BLD AUTO: 0.4 % — SIGNIFICANT CHANGE UP (ref 0–2)
BENZODIAZ SERPL-MCNC: NEGATIVE — SIGNIFICANT CHANGE UP
BENZODIAZ UR-MCNC: NEGATIVE — SIGNIFICANT CHANGE UP
BILIRUB SERPL-MCNC: < 0.2 MG/DL — LOW (ref 0.2–1.2)
BILIRUB UR-MCNC: NEGATIVE — SIGNIFICANT CHANGE UP
BLOOD UR QL VISUAL: NEGATIVE — SIGNIFICANT CHANGE UP
BUN SERPL-MCNC: 6 MG/DL — LOW (ref 7–23)
CALCIUM SERPL-MCNC: 8.5 MG/DL — SIGNIFICANT CHANGE UP (ref 8.4–10.5)
CANNABINOIDS UR-MCNC: NEGATIVE — SIGNIFICANT CHANGE UP
CHLORIDE SERPL-SCNC: 109 MMOL/L — HIGH (ref 98–107)
CO2 SERPL-SCNC: 19 MMOL/L — LOW (ref 22–31)
COCAINE METAB.OTHER UR-MCNC: NEGATIVE — SIGNIFICANT CHANGE UP
COLOR SPEC: SIGNIFICANT CHANGE UP
CREAT SERPL-MCNC: 0.67 MG/DL — SIGNIFICANT CHANGE UP (ref 0.5–1.3)
EOSINOPHIL # BLD AUTO: 0.36 K/UL — SIGNIFICANT CHANGE UP (ref 0–0.5)
EOSINOPHIL NFR BLD AUTO: 4 % — SIGNIFICANT CHANGE UP (ref 0–6)
ETHANOL BLD-MCNC: 291 MG/DL — HIGH
GLUCOSE SERPL-MCNC: 89 MG/DL — SIGNIFICANT CHANGE UP (ref 70–99)
GLUCOSE UR-MCNC: NEGATIVE — SIGNIFICANT CHANGE UP
HCT VFR BLD CALC: 36.7 % — SIGNIFICANT CHANGE UP (ref 34.5–45)
HGB BLD-MCNC: 11.2 G/DL — LOW (ref 11.5–15.5)
IMM GRANULOCYTES NFR BLD AUTO: 0.4 % — SIGNIFICANT CHANGE UP (ref 0–1.5)
KETONES UR-MCNC: NEGATIVE — SIGNIFICANT CHANGE UP
LEUKOCYTE ESTERASE UR-ACNC: HIGH
LYMPHOCYTES # BLD AUTO: 3.09 K/UL — SIGNIFICANT CHANGE UP (ref 1–3.3)
LYMPHOCYTES # BLD AUTO: 34.2 % — SIGNIFICANT CHANGE UP (ref 13–44)
MCHC RBC-ENTMCNC: 27.9 PG — SIGNIFICANT CHANGE UP (ref 27–34)
MCHC RBC-ENTMCNC: 30.5 % — LOW (ref 32–36)
MCV RBC AUTO: 91.3 FL — SIGNIFICANT CHANGE UP (ref 80–100)
METHADONE UR-MCNC: NEGATIVE — SIGNIFICANT CHANGE UP
MONOCYTES # BLD AUTO: 0.47 K/UL — SIGNIFICANT CHANGE UP (ref 0–0.9)
MONOCYTES NFR BLD AUTO: 5.2 % — SIGNIFICANT CHANGE UP (ref 2–14)
NEUTROPHILS # BLD AUTO: 5.04 K/UL — SIGNIFICANT CHANGE UP (ref 1.8–7.4)
NEUTROPHILS NFR BLD AUTO: 55.8 % — SIGNIFICANT CHANGE UP (ref 43–77)
NITRITE UR-MCNC: NEGATIVE — SIGNIFICANT CHANGE UP
OPIATES UR-MCNC: NEGATIVE — SIGNIFICANT CHANGE UP
OXYCODONE UR-MCNC: NEGATIVE — SIGNIFICANT CHANGE UP
PCP UR-MCNC: NEGATIVE — SIGNIFICANT CHANGE UP
PH UR: 6.5 — SIGNIFICANT CHANGE UP (ref 4.6–8)
PLATELET # BLD AUTO: 451 K/UL — HIGH (ref 150–400)
PMV BLD: 9.3 FL — SIGNIFICANT CHANGE UP (ref 7–13)
POTASSIUM SERPL-MCNC: 3.7 MMOL/L — SIGNIFICANT CHANGE UP (ref 3.5–5.3)
POTASSIUM SERPL-SCNC: 3.7 MMOL/L — SIGNIFICANT CHANGE UP (ref 3.5–5.3)
PROT SERPL-MCNC: 6.8 G/DL — SIGNIFICANT CHANGE UP (ref 6–8.3)
PROT UR-MCNC: NEGATIVE — SIGNIFICANT CHANGE UP
RBC # BLD: 4.02 M/UL — SIGNIFICANT CHANGE UP (ref 3.8–5.2)
RBC # FLD: 19.7 % — HIGH (ref 10.3–14.5)
RBC CASTS # UR COMP ASSIST: SIGNIFICANT CHANGE UP (ref 0–?)
SALICYLATES SERPL-MCNC: < 5 MG/DL — LOW (ref 15–30)
SODIUM SERPL-SCNC: 144 MMOL/L — SIGNIFICANT CHANGE UP (ref 135–145)
SP GR SPEC: 1 — LOW (ref 1–1.03)
UROBILINOGEN FLD QL: NORMAL E.U. — SIGNIFICANT CHANGE UP (ref 0.1–0.2)
WBC # BLD: 9.04 K/UL — SIGNIFICANT CHANGE UP (ref 3.8–10.5)
WBC # FLD AUTO: 9.04 K/UL — SIGNIFICANT CHANGE UP (ref 3.8–10.5)
WBC UR QL: HIGH (ref 0–?)

## 2017-04-18 PROCEDURE — 99284 EMERGENCY DEPT VISIT MOD MDM: CPT
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
## 2017-04-18 NOTE — ED PROVIDER NOTE - ATTENDING CONTRIBUTION TO CARE
[place] Seen and examined, brought in by EMS, EtOH use PTA, arousable to verbal, occ. appropriate to commands and questioning, denies trauma or injury, denies fever or recent illness. Clear lungs, soft abd, NT to palp, no ecchymoses or deformities.

## 2017-04-18 NOTE — ED PROVIDER NOTE - PMH
Anorexia    Chronic Gastric Ulcer with Bleeding    Clinical Depression    Post Traumatic Stress Disorder

## 2017-04-18 NOTE — ED PROVIDER NOTE - OBJECTIVE STATEMENT
30 y/o female female hx ptsd, depression etoch abuse presents to ED for ETOH/agitation. Pt. currently intoxicated and poor historian  - patient states they called EMS/[employer] at [facility] and made her come to ED and does not know why. As per triage note/ems - pt was drunk and agitated and EMS was called and brought to ER - pt. de-escalated in  and sent to main ED for further evaluation. Pt. states feeling well currently and admits to drinking  24oz can of beer. Denies SI/HI or any hallucinations. States feels "fine" and "wants to go home'. Denies cp sob ha loc nausea vomit.

## 2017-04-19 VITALS
SYSTOLIC BLOOD PRESSURE: 83 MMHG | TEMPERATURE: 98 F | DIASTOLIC BLOOD PRESSURE: 49 MMHG | OXYGEN SATURATION: 98 % | RESPIRATION RATE: 22 BRPM | HEART RATE: 88 BPM

## 2017-04-19 RX ORDER — SODIUM CHLORIDE 9 MG/ML
1000 INJECTION INTRAMUSCULAR; INTRAVENOUS; SUBCUTANEOUS ONCE
Qty: 0 | Refills: 0 | Status: COMPLETED | OUTPATIENT
Start: 2017-04-19 | End: 2017-04-19

## 2017-04-19 RX ADMIN — SODIUM CHLORIDE 2000 MILLILITER(S): 9 INJECTION INTRAMUSCULAR; INTRAVENOUS; SUBCUTANEOUS at 00:44

## 2017-04-19 RX ADMIN — Medication 75 MILLIGRAM(S): at 01:44

## 2017-04-19 RX ADMIN — Medication 2 MILLIGRAM(S): at 00:43

## 2017-04-19 NOTE — ED ADULT NURSE REASSESSMENT NOTE - NS ED NURSE REASSESS COMMENT FT1
pt. found having seizure-like activity, MD Mares made aware, pt. medicated as ordered by VALENTINA LANDERS.  Vital signs stable. Will continue to monitor.

## 2017-08-11 ENCOUNTER — EMERGENCY (EMERGENCY)
Facility: HOSPITAL | Age: 32
LOS: 1 days | Discharge: HOME | End: 2017-08-11
Admitting: EMERGENCY MEDICINE
Payer: MEDICARE

## 2017-08-11 VITALS
SYSTOLIC BLOOD PRESSURE: 120 MMHG | HEART RATE: 96 BPM | RESPIRATION RATE: 16 BRPM | OXYGEN SATURATION: 96 % | DIASTOLIC BLOOD PRESSURE: 88 MMHG

## 2017-08-11 VITALS
SYSTOLIC BLOOD PRESSURE: 122 MMHG | TEMPERATURE: 98 F | HEART RATE: 103 BPM | RESPIRATION RATE: 16 BRPM | DIASTOLIC BLOOD PRESSURE: 70 MMHG | OXYGEN SATURATION: 98 %

## 2017-08-11 DIAGNOSIS — F10.99 ALCOHOL USE, UNSPECIFIED WITH UNSPECIFIED ALCOHOL-INDUCED DISORDER: ICD-10-CM

## 2017-08-11 DIAGNOSIS — F43.24 ADJUSTMENT DISORDER WITH DISTURBANCE OF CONDUCT: ICD-10-CM

## 2017-08-11 LAB
ALBUMIN SERPL ELPH-MCNC: 4.1 G/DL — SIGNIFICANT CHANGE UP (ref 3.3–5)
ALP SERPL-CCNC: 48 U/L — SIGNIFICANT CHANGE UP (ref 40–120)
ALT FLD-CCNC: 70 U/L — HIGH (ref 4–33)
AMYLASE P1 CFR SERPL: 192 U/L — HIGH (ref 25–125)
ANISOCYTOSIS BLD QL: SLIGHT — SIGNIFICANT CHANGE UP
AST SERPL-CCNC: 110 U/L — HIGH (ref 4–32)
BASOPHILS # BLD AUTO: 0.04 K/UL — SIGNIFICANT CHANGE UP (ref 0–0.2)
BASOPHILS NFR BLD AUTO: 1.4 % — SIGNIFICANT CHANGE UP (ref 0–2)
BILIRUB SERPL-MCNC: < 0.2 MG/DL — LOW (ref 0.2–1.2)
BUN SERPL-MCNC: 3 MG/DL — LOW (ref 7–23)
CALCIUM SERPL-MCNC: 8.1 MG/DL — LOW (ref 8.4–10.5)
CHLORIDE SERPL-SCNC: 106 MMOL/L — SIGNIFICANT CHANGE UP (ref 98–107)
CO2 SERPL-SCNC: 23 MMOL/L — SIGNIFICANT CHANGE UP (ref 22–31)
CREAT SERPL-MCNC: 0.54 MG/DL — SIGNIFICANT CHANGE UP (ref 0.5–1.3)
EOSINOPHIL # BLD AUTO: 0.03 K/UL — SIGNIFICANT CHANGE UP (ref 0–0.5)
EOSINOPHIL NFR BLD AUTO: 1.1 % — SIGNIFICANT CHANGE UP (ref 0–6)
GLUCOSE SERPL-MCNC: 88 MG/DL — SIGNIFICANT CHANGE UP (ref 70–99)
HCT VFR BLD CALC: 28.3 % — LOW (ref 34.5–45)
HGB BLD-MCNC: 8.8 G/DL — LOW (ref 11.5–15.5)
HYPOCHROMIA BLD QL: SLIGHT — SIGNIFICANT CHANGE UP
IMM GRANULOCYTES # BLD AUTO: 0.01 # — SIGNIFICANT CHANGE UP
IMM GRANULOCYTES NFR BLD AUTO: 0.4 % — SIGNIFICANT CHANGE UP (ref 0–1.5)
LG PLATELETS BLD QL AUTO: SLIGHT — SIGNIFICANT CHANGE UP
LIDOCAIN IGE QN: 70.6 U/L — HIGH (ref 7–60)
LYMPHOCYTES # BLD AUTO: 0.99 K/UL — LOW (ref 1–3.3)
LYMPHOCYTES # BLD AUTO: 35.4 % — SIGNIFICANT CHANGE UP (ref 13–44)
MANUAL SMEAR VERIFICATION: SIGNIFICANT CHANGE UP
MCHC RBC-ENTMCNC: 26.2 PG — LOW (ref 27–34)
MCHC RBC-ENTMCNC: 31.1 % — LOW (ref 32–36)
MCV RBC AUTO: 84.2 FL — SIGNIFICANT CHANGE UP (ref 80–100)
MONOCYTES # BLD AUTO: 0.48 K/UL — SIGNIFICANT CHANGE UP (ref 0–0.9)
MONOCYTES NFR BLD AUTO: 17.1 % — HIGH (ref 2–14)
NEUTROPHILS # BLD AUTO: 1.25 K/UL — LOW (ref 1.8–7.4)
NEUTROPHILS NFR BLD AUTO: 44.6 % — SIGNIFICANT CHANGE UP (ref 43–77)
NRBC # FLD: 0 — SIGNIFICANT CHANGE UP
OVALOCYTES BLD QL SMEAR: SLIGHT — SIGNIFICANT CHANGE UP
PLATELET # BLD AUTO: 226 K/UL — SIGNIFICANT CHANGE UP (ref 150–400)
PLATELET COUNT - ESTIMATE: NORMAL — SIGNIFICANT CHANGE UP
PMV BLD: 9.2 FL — SIGNIFICANT CHANGE UP (ref 7–13)
POTASSIUM SERPL-MCNC: 3.3 MMOL/L — LOW (ref 3.5–5.3)
POTASSIUM SERPL-SCNC: 3.3 MMOL/L — LOW (ref 3.5–5.3)
PROT SERPL-MCNC: 6.5 G/DL — SIGNIFICANT CHANGE UP (ref 6–8.3)
RBC # BLD: 3.36 M/UL — LOW (ref 3.8–5.2)
RBC # FLD: 21.4 % — HIGH (ref 10.3–14.5)
REVIEW TO FOLLOW: YES — SIGNIFICANT CHANGE UP
SODIUM SERPL-SCNC: 148 MMOL/L — HIGH (ref 135–145)
WBC # BLD: 2.8 K/UL — LOW (ref 3.8–10.5)
WBC # FLD AUTO: 2.8 K/UL — LOW (ref 3.8–10.5)

## 2017-08-11 PROCEDURE — 99285 EMERGENCY DEPT VISIT HI MDM: CPT

## 2017-08-11 PROCEDURE — 70450 CT HEAD/BRAIN W/O DYE: CPT | Mod: 26

## 2017-08-11 PROCEDURE — 99285 EMERGENCY DEPT VISIT HI MDM: CPT | Mod: GC

## 2017-08-11 PROCEDURE — 72125 CT NECK SPINE W/O DYE: CPT | Mod: 26

## 2017-08-11 RX ORDER — SODIUM CHLORIDE 9 MG/ML
2000 INJECTION INTRAMUSCULAR; INTRAVENOUS; SUBCUTANEOUS ONCE
Qty: 0 | Refills: 0 | Status: COMPLETED | OUTPATIENT
Start: 2017-08-11 | End: 2017-08-11

## 2017-08-11 RX ORDER — ONDANSETRON 8 MG/1
4 TABLET, FILM COATED ORAL ONCE
Qty: 0 | Refills: 0 | Status: COMPLETED | OUTPATIENT
Start: 2017-08-11 | End: 2017-08-11

## 2017-08-11 RX ORDER — FAMOTIDINE 10 MG/ML
20 INJECTION INTRAVENOUS ONCE
Qty: 0 | Refills: 0 | Status: COMPLETED | OUTPATIENT
Start: 2017-08-11 | End: 2017-08-11

## 2017-08-11 RX ORDER — POTASSIUM CHLORIDE 20 MEQ
40 PACKET (EA) ORAL ONCE
Qty: 0 | Refills: 0 | Status: COMPLETED | OUTPATIENT
Start: 2017-08-11 | End: 2017-08-11

## 2017-08-11 RX ADMIN — ONDANSETRON 4 MILLIGRAM(S): 8 TABLET, FILM COATED ORAL at 12:16

## 2017-08-11 RX ADMIN — Medication 40 MILLIEQUIVALENT(S): at 14:01

## 2017-08-11 RX ADMIN — SODIUM CHLORIDE 2000 MILLILITER(S): 9 INJECTION INTRAMUSCULAR; INTRAVENOUS; SUBCUTANEOUS at 12:16

## 2017-08-11 RX ADMIN — FAMOTIDINE 20 MILLIGRAM(S): 10 INJECTION INTRAVENOUS at 12:15

## 2017-08-11 RX ADMIN — Medication 75 MILLIGRAM(S): at 12:15

## 2017-08-11 NOTE — ED BEHAVIORAL HEALTH ASSESSMENT NOTE - PRIMARY DX
Severe episode of recurrent major depressive disorder, without psychotic features Borderline personality disorder Adjustment disorder with disturbance of conduct

## 2017-08-11 NOTE — ED PROVIDER NOTE - ATTENDING CONTRIBUTION TO CARE
AJM: Pt seen with resident and agree with above note. 31 y/o homeless F pmh alcohol abuse p/w alcohol intoxication. Endorses headache, diaphoresis, pins and needles, nausea, one episode vomiting, and burning 7/10 periumbilical pain radiating to the back. Endorses episode of seizure last night where she hit her head and states she lost consciousness. Hs history of etoh withdrawal. Drink vodka daily. Denies any drug use. Mild tremors noted with mild tachycardia. No tongue fasiculations. + epigasteric pain. will check lipase, ct head/cspine, valium. likely dc

## 2017-08-11 NOTE — ED PROVIDER NOTE - PROGRESS NOTE DETAILS
AJM: VS improved. not tremulous. tolerating PO. dc home with librium. clinically sober AJM: after patient was discharged she explained to the triage nurse she wanted to cut her wrists and proceeded to attempt to do so .pt sent to . again medically cleared at this time Lyla NP- Patient seen and assessed. Small superficial  cut to right wrist. Patient escorted to  department for evaluation. No evidence of acute distress or withdrawal.

## 2017-08-11 NOTE — ED BEHAVIORAL HEALTH ASSESSMENT NOTE - AXIS IV
Housing problems/Occupational problems/Problems with access to healthcare services/Problem related to social environment/Economic problems Housing problems/Economic problems/Problem related to social environment/Occupational problems

## 2017-08-11 NOTE — ED BEHAVIORAL HEALTH ASSESSMENT NOTE - OTHER PAST PSYCHIATRIC HISTORY (INCLUDE DETAILS REGARDING ONSET, COURSE OF ILLNESS, INPATIENT/OUTPATIENT TREATMENT)
PPH MDD, borderline personality disorder, post-traumatic stress disorder, anorexia, alcohol use disorder, opioid abuse in remission. Hx of multiple inpatient hospitalizations, > 10 last at [facility] December 2016. Hx of one prior suicide attempt 1 year ago, by cutting her wrists. Recently discharged from [facility] for non compliance with substance abuse treatment.
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
## 2017-08-11 NOTE — ED BEHAVIORAL HEALTH ASSESSMENT NOTE - PSYCHIATRIC ISSUES AND PLAN (INCLUDE STANDING AND PRN MEDICATION)
Trazodone 100mg QHS, Effexor XR 300mg Daily, Topamax 100mg BID, Neurontin 400mg TID, Haldol 5mg PO/IM PRN, Hydroxyzine HCL 50mg PRN Q6, Ativan 2mg IM, PRN, Benadryl 50mg PO/IM PRN

## 2017-08-11 NOTE — ED ADULT TRIAGE NOTE - CHIEF COMPLAINT QUOTE
found on street intoxicated, and requesting treatment.  Last drink was last night as per patient, blue substance noted to patient's hands.  Denies any drug use. PMH: withdrawal seizures, heroin abuse,
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
## 2017-08-11 NOTE — ED BEHAVIORAL HEALTH ASSESSMENT NOTE - ACTIVATING EVENTS/STRESSORS
Current or pending isolation/Non-compliant with treatment/None known/Pending incarceration or homelessness/Recent change in treartment Pending incarceration or homelessness

## 2017-08-11 NOTE — ED BEHAVIORAL HEALTH ASSESSMENT NOTE - DIFFERENTIAL
Substance induced mood disorder adjustment disorder with disturbance of conduct   alcohol use disorder

## 2017-08-11 NOTE — ED BEHAVIORAL HEALTH ASSESSMENT NOTE - CURRENT MEDICATION
Effexor XR 300mg Daily, Topamax 100mg BID, Neurontin 400mg TID, Trazodone 100mg QHS    Patient reports compliance with medication

## 2017-08-11 NOTE — ED BEHAVIORAL HEALTH ASSESSMENT NOTE - DESCRIPTION (FIRST USE, LAST USE, QUANTITY, FREQUENCY, DURATION)
1.5 PPD history of alcohol dependence- relapsed 1 month ago and endorses drinking daily up to 1 Quart of Vodka/day remote history; none recent history of alcohol dependence, endorses drinking daily

## 2017-08-11 NOTE — ED ADULT NURSE REASSESSMENT NOTE - NS ED NURSE REASSESS COMMENT FT1
RN Break Coverage: received pt to  for evaluation s/p discharge from main ED of attempting to cut her wrists with a razor blade. pt awake a&ox4, denies SI/HI, denies AH/VH. skin warm, dry, appropriate for race. pt unkempt. respirations even unlabored. denies any additional complaints at this time, continuously stating "I want my stuff". pt evaluated by ZULLY Ann. safety maintained, pending disposition at this time.

## 2017-08-11 NOTE — ED BEHAVIORAL HEALTH ASSESSMENT NOTE - RISK ASSESSMENT
Patient presents an imminent risk to self as evidence by active suicidal thoughts/plan/intent, history of suicide attempts, history of multiple inpatient admissions, significant psychosocial stressors, homelessness, no social supports, depression, hopelessness, inability to contract for safety & active substance abuse.    protective factors- no homicidal thoughts, no SIB, no aggression/violence, no legal issues, no tita, no psychotic symptoms and calm/cooperative throughout evaluation. patient not at acutely elevated risk of harm to self or others

## 2017-08-11 NOTE — ED BEHAVIORAL HEALTH ASSESSMENT NOTE - DESCRIPTION
Upon arrival to the ER patient's BAL was 214. She was given Librium 50mg at 06:34AM and later Ativan 2mg PO at 10:00 to prevent withdrawal symptoms. During course of hospitalization patient was calm and cooperative. Patient was not aggressive or violent. none see hpi calm, cooperative, and in good behavioral control

## 2017-08-11 NOTE — ED ADULT NURSE NOTE - OBJECTIVE STATEMENT
received pt A&Ox3 in no apparent distress at this time. #18g IVL L AC, bloods drawn and sent to the lab. no s/s of infiltration noted at this time. MD at bedside. vss. dispo pending

## 2017-08-11 NOTE — ED PROVIDER NOTE - MEDICAL DECISION MAKING DETAILS
31 y/o homeless F p/w alcohol withdrawal s/p seizure  -ct head and c-spine  -75 librium, 2L NS bolus, CBC, CMP, lipase, zofran, pepcid

## 2017-08-11 NOTE — ED BEHAVIORAL HEALTH ASSESSMENT NOTE - SUMMARY
Patient is a 31 year old single, unemployed female currently homeless and previously domiciled for the last four months at [facility], can't return there because of violations. PPH MDD, borderline personality disorder, post-traumatic stress disorder, anorexia, alcohol use disorder, opioid abuse in remission. Hx of multiple inpatient hospitalizations, > 10 last at [facility] December 2016. Hx of one prior suicide attempt 1 year ago, by cutting her wrists. She denies history of aggression/violence or current legal issues. Currently abusing Alcohol and history of multiple detox/rehab > 4, history of withdrawal seizures. She denies PMH. BIBA for suicidal ideation and ETOH intox.    Patient presents to the ER in the context of suicidal behavior- attempting to walk into traffic last night in a suicide attempt while intoxicated. Upon sobriety patient continues to endorse active suicidal ideation with plan & intent to walk into traffic. She is unable to contract for safety and is impulsive and unpredictable. She presents an imminent risk to self and requires inpatient admission for safety and stabilization. Patient is a 31 year old single, unemployed female currently homeless and previously domiciled for the last four months at [facility], can't return there because of violations. PPH MDD, borderline personality disorder, post-traumatic stress disorder, anorexia, alcohol use disorder, opioid abuse in remission. Hx of multiple inpatient hospitalizations, > 10 last at [facility] December 2016. Hx of one prior suicide attempt 1 year ago, by cutting her wrists. She denies history of aggression/violence or current legal issues. Currently abusing Alcohol and history of multiple detox/rehab > 4, reported history of withdrawal seizures. She denies PMH, presents after superficially cutting her wrist following discharge from [facility] ED for alcohol intoxication.   Patient was in medical ED earlier today for alcohol intoxication. She was monitored and determined not to be in acute alcohol withdrawal and was discharged with Librium. She returned a short time later after superficially cutting her wrist with a razor. No stitches were required and she was sent to  ED.     On assessment, she reports cutting her wrist because she wanted to come back to the ED, as she felt she was experiencing acute alcohol withdrawal. She was evaluated by medical NP Seth Arita and was determined not to be in acute alcohol withdrawal. She was again medically cleared for discharge. Pt states cutting her wrist was not a suicidal gesture. She denies any suicidal ideation, intent, or plan. She currently denies any desire to self-harm. She denies HI. She expresses desire to be discharged. She does not appear depressed, psychotic, or manic. She is calm, cooperative, and in good behavioral control.   Pt does not represent an acute danger to self or others, and she does not require inpatient psychiatric hospitalization at this time.

## 2017-08-11 NOTE — ED BEHAVIORAL HEALTH ASSESSMENT NOTE - SUICIDE RISK FACTORS
History of abuse/trauma/Hopelessness/Mood episode/Access to means (pills, firearms, etc.)/Substance abuse/dependence/Global insomnia/Agitation/severe anxiety/Highly impulsive behavior Access to means (pills, firearms, etc.)/Substance abuse/dependence/History of abuse/trauma

## 2017-08-11 NOTE — ED PROVIDER NOTE - OBJECTIVE STATEMENT
33 y/o homeless F pmh alcohol abuse p/w alcohol withdrawal. Endorses headache, diaphoresis, pins and needles, nausea, one episode vomiting, and burning 7/10 periumbilical pain radiating to the back. Endorses episode of seizure last night where she hit her head and states she lost consciousness. 2 weeks ago had similar episode but drank more alcohol to self-medicate. Denies fever, chills, chest pain, sob. 31 y/o homeless F pmh alcohol abuse p/w alcohol intoxication. Endorses headache, diaphoresis, pins and needles, nausea, one episode vomiting, and burning 7/10 periumbilical pain radiating to the back. Endorses episode of seizure last night where she hit her head and states she lost consciousness. 2 weeks ago had similar episode but drank more alcohol to self-medicate. Denies fever, chills, chest pain, sob.

## 2017-10-05 ENCOUNTER — INPATIENT (INPATIENT)
Facility: HOSPITAL | Age: 32
LOS: 13 days | Discharge: HOME | DRG: 896 | End: 2017-10-19
Attending: SPECIALIST | Admitting: SPECIALIST
Payer: MEDICARE

## 2017-10-05 VITALS
OXYGEN SATURATION: 95 % | RESPIRATION RATE: 18 BRPM | HEART RATE: 104 BPM | SYSTOLIC BLOOD PRESSURE: 126 MMHG | DIASTOLIC BLOOD PRESSURE: 93 MMHG

## 2017-10-05 LAB
ALBUMIN SERPL ELPH-MCNC: 4.8 G/DL — SIGNIFICANT CHANGE UP (ref 3.3–5)
ALP SERPL-CCNC: 53 U/L — SIGNIFICANT CHANGE UP (ref 40–120)
ALT FLD-CCNC: 26 U/L RC — SIGNIFICANT CHANGE UP (ref 10–45)
AMPHET UR-MCNC: NEGATIVE — SIGNIFICANT CHANGE UP
ANION GAP SERPL CALC-SCNC: 18 MMOL/L — HIGH (ref 5–17)
APAP SERPL-MCNC: <15 UG/ML — SIGNIFICANT CHANGE UP (ref 10–30)
APPEARANCE UR: CLEAR — SIGNIFICANT CHANGE UP
AST SERPL-CCNC: 44 U/L — HIGH (ref 10–40)
BARBITURATES UR SCN-MCNC: NEGATIVE — SIGNIFICANT CHANGE UP
BASOPHILS # BLD AUTO: 0.1 K/UL — SIGNIFICANT CHANGE UP (ref 0–0.2)
BASOPHILS NFR BLD AUTO: 1.8 % — SIGNIFICANT CHANGE UP (ref 0–2)
BENZODIAZ UR-MCNC: POSITIVE
BILIRUB SERPL-MCNC: 0.1 MG/DL — LOW (ref 0.2–1.2)
BILIRUB UR-MCNC: NEGATIVE — SIGNIFICANT CHANGE UP
BUN SERPL-MCNC: 5 MG/DL — LOW (ref 7–23)
CALCIUM SERPL-MCNC: 8.4 MG/DL — SIGNIFICANT CHANGE UP (ref 8.4–10.5)
CHLORIDE SERPL-SCNC: 104 MMOL/L — SIGNIFICANT CHANGE UP (ref 96–108)
CK SERPL-CCNC: 533 U/L — HIGH (ref 25–170)
CO2 SERPL-SCNC: 24 MMOL/L — SIGNIFICANT CHANGE UP (ref 22–31)
COCAINE METAB.OTHER UR-MCNC: NEGATIVE — SIGNIFICANT CHANGE UP
COLOR SPEC: SIGNIFICANT CHANGE UP
CREAT SERPL-MCNC: 0.59 MG/DL — SIGNIFICANT CHANGE UP (ref 0.5–1.3)
DIFF PNL FLD: NEGATIVE — SIGNIFICANT CHANGE UP
EOSINOPHIL # BLD AUTO: 0 K/UL — SIGNIFICANT CHANGE UP (ref 0–0.5)
EOSINOPHIL NFR BLD AUTO: 0 % — SIGNIFICANT CHANGE UP (ref 0–6)
ETHANOL SERPL-MCNC: 365 MG/DL — HIGH (ref 0–10)
GLUCOSE SERPL-MCNC: 114 MG/DL — HIGH (ref 70–99)
GLUCOSE UR QL: NEGATIVE — SIGNIFICANT CHANGE UP
HCG UR QL: NEGATIVE — SIGNIFICANT CHANGE UP
HCT VFR BLD CALC: 34.7 % — SIGNIFICANT CHANGE UP (ref 34.5–45)
HGB BLD-MCNC: 11.2 G/DL — LOW (ref 11.5–15.5)
KETONES UR-MCNC: NEGATIVE — SIGNIFICANT CHANGE UP
LEUKOCYTE ESTERASE UR-ACNC: NEGATIVE — SIGNIFICANT CHANGE UP
LYMPHOCYTES # BLD AUTO: 1.9 K/UL — SIGNIFICANT CHANGE UP (ref 1–3.3)
LYMPHOCYTES # BLD AUTO: 45.4 % — HIGH (ref 13–44)
MCHC RBC-ENTMCNC: 27.5 PG — SIGNIFICANT CHANGE UP (ref 27–34)
MCHC RBC-ENTMCNC: 32.4 GM/DL — SIGNIFICANT CHANGE UP (ref 32–36)
MCV RBC AUTO: 84.8 FL — SIGNIFICANT CHANGE UP (ref 80–100)
METHADONE UR-MCNC: NEGATIVE — SIGNIFICANT CHANGE UP
MONOCYTES # BLD AUTO: 0.3 K/UL — SIGNIFICANT CHANGE UP (ref 0–0.9)
MONOCYTES NFR BLD AUTO: 6.6 % — SIGNIFICANT CHANGE UP (ref 2–14)
NEUTROPHILS # BLD AUTO: 1.9 K/UL — SIGNIFICANT CHANGE UP (ref 1.8–7.4)
NEUTROPHILS NFR BLD AUTO: 46.2 % — SIGNIFICANT CHANGE UP (ref 43–77)
NITRITE UR-MCNC: NEGATIVE — SIGNIFICANT CHANGE UP
OPIATES UR-MCNC: NEGATIVE — SIGNIFICANT CHANGE UP
OXYCODONE UR-MCNC: NEGATIVE — SIGNIFICANT CHANGE UP
PCP SPEC-MCNC: SIGNIFICANT CHANGE UP
PCP UR-MCNC: NEGATIVE — SIGNIFICANT CHANGE UP
PH UR: 6 — SIGNIFICANT CHANGE UP (ref 5–8)
PLATELET # BLD AUTO: 508 K/UL — HIGH (ref 150–400)
POTASSIUM SERPL-MCNC: 3.9 MMOL/L — SIGNIFICANT CHANGE UP (ref 3.5–5.3)
POTASSIUM SERPL-SCNC: 3.9 MMOL/L — SIGNIFICANT CHANGE UP (ref 3.5–5.3)
PROT SERPL-MCNC: 7.9 G/DL — SIGNIFICANT CHANGE UP (ref 6–8.3)
PROT UR-MCNC: NEGATIVE — SIGNIFICANT CHANGE UP
RBC # BLD: 4.09 M/UL — SIGNIFICANT CHANGE UP (ref 3.8–5.2)
RBC # FLD: 19.8 % — HIGH (ref 10.3–14.5)
SALICYLATES SERPL-MCNC: <2 MG/DL — LOW (ref 15–30)
SODIUM SERPL-SCNC: 146 MMOL/L — HIGH (ref 135–145)
SP GR SPEC: 1.01 — LOW (ref 1.01–1.02)
THC UR QL: NEGATIVE — SIGNIFICANT CHANGE UP
UROBILINOGEN FLD QL: NEGATIVE — SIGNIFICANT CHANGE UP
WBC # BLD: 4.1 K/UL — SIGNIFICANT CHANGE UP (ref 3.8–10.5)
WBC # FLD AUTO: 4.1 K/UL — SIGNIFICANT CHANGE UP (ref 3.8–10.5)
WBC UR QL: SIGNIFICANT CHANGE UP /HPF (ref 0–5)

## 2017-10-05 PROCEDURE — 93010 ELECTROCARDIOGRAM REPORT: CPT

## 2017-10-05 PROCEDURE — 99285 EMERGENCY DEPT VISIT HI MDM: CPT | Mod: 25

## 2017-10-05 RX ORDER — MAGNESIUM SULFATE 500 MG/ML
2 VIAL (ML) INJECTION ONCE
Qty: 0 | Refills: 0 | Status: COMPLETED | OUTPATIENT
Start: 2017-10-05 | End: 2017-10-05

## 2017-10-05 RX ORDER — SODIUM CHLORIDE 9 MG/ML
2000 INJECTION INTRAMUSCULAR; INTRAVENOUS; SUBCUTANEOUS ONCE
Qty: 0 | Refills: 0 | Status: COMPLETED | OUTPATIENT
Start: 2017-10-05 | End: 2017-10-05

## 2017-10-05 RX ADMIN — SODIUM CHLORIDE 2000 MILLILITER(S): 9 INJECTION INTRAMUSCULAR; INTRAVENOUS; SUBCUTANEOUS at 18:41

## 2017-10-05 RX ADMIN — Medication 50 GRAM(S): at 20:35

## 2017-10-05 RX ADMIN — Medication 1 MILLIGRAM(S): at 19:48
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
## 2017-10-05 NOTE — ED ADULT NURSE REASSESSMENT NOTE - NS ED NURSE REASSESS COMMENT FT1
pt sleeping in stretcher. VSS. pt denies pain. pt appears comfortable, NAD noted. plan of care discussed. safety and comfort measures maintained.

## 2017-10-05 NOTE — ED PROVIDER NOTE - MEDICAL DECISION MAKING DETAILS
Intoxicated patient without obvious signs of trauma.  Will check labs, drug screen/ingestions, CK for unknown down time, hydrate, reassess.

## 2017-10-05 NOTE — ED PROVIDER NOTE - PROGRESS NOTE DETAILS
JOEY: IVF, Long QT, Mag given, reassess patient given ativan for agitation  Charleen Mayes, PGY3 JOEY: Nonfocal on exam, observe

## 2017-10-05 NOTE — ED PROVIDER NOTE - ATTENDING CONTRIBUTION TO CARE
The patient is a 32y Female complaining of altered mental status. found intoxicated BIB EMS, h/o multi drug abuse from detox home, nonfocal on exam, follow commends, IVF, labs, neuro checks, reassess

## 2017-10-05 NOTE — ED ADULT NURSE REASSESSMENT NOTE - NS ED NURSE REASSESS COMMENT FT1
pt is in front of doctor and nurses desk. pt is asleep, appears comfortable, every time upon waking up pt screams "I need ativan". pt is not tremulous, pt is drinking water without difficulty. social work at bedside. food provided to pt. NAD noted. plan of care discussed. safety and comfort measures maintained.
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
## 2017-10-06 DIAGNOSIS — Z86.59 PERSONAL HISTORY OF OTHER MENTAL AND BEHAVIORAL DISORDERS: ICD-10-CM

## 2017-10-06 DIAGNOSIS — Z29.9 ENCOUNTER FOR PROPHYLACTIC MEASURES, UNSPECIFIED: ICD-10-CM

## 2017-10-06 DIAGNOSIS — F10.929 ALCOHOL USE, UNSPECIFIED WITH INTOXICATION, UNSPECIFIED: ICD-10-CM

## 2017-10-06 DIAGNOSIS — Z65.9 PROBLEM RELATED TO UNSPECIFIED PSYCHOSOCIAL CIRCUMSTANCES: ICD-10-CM

## 2017-10-06 DIAGNOSIS — F10.239 ALCOHOL DEPENDENCE WITH WITHDRAWAL, UNSPECIFIED: ICD-10-CM

## 2017-10-06 LAB
ALBUMIN SERPL ELPH-MCNC: 3.9 G/DL — SIGNIFICANT CHANGE UP (ref 3.3–5)
ALP SERPL-CCNC: 40 U/L — SIGNIFICANT CHANGE UP (ref 40–120)
ALT FLD-CCNC: 20 U/L RC — SIGNIFICANT CHANGE UP (ref 10–45)
ANION GAP SERPL CALC-SCNC: 11 MMOL/L — SIGNIFICANT CHANGE UP (ref 5–17)
AST SERPL-CCNC: 36 U/L — SIGNIFICANT CHANGE UP (ref 10–40)
BILIRUB DIRECT SERPL-MCNC: 0.1 MG/DL — SIGNIFICANT CHANGE UP (ref 0–0.2)
BILIRUB INDIRECT FLD-MCNC: 0.2 MG/DL — SIGNIFICANT CHANGE UP (ref 0.2–1)
BILIRUB SERPL-MCNC: 0.3 MG/DL — SIGNIFICANT CHANGE UP (ref 0.2–1.2)
BUN SERPL-MCNC: 6 MG/DL — LOW (ref 7–23)
CALCIUM SERPL-MCNC: 8.2 MG/DL — LOW (ref 8.4–10.5)
CHLORIDE SERPL-SCNC: 102 MMOL/L — SIGNIFICANT CHANGE UP (ref 96–108)
CO2 SERPL-SCNC: 28 MMOL/L — SIGNIFICANT CHANGE UP (ref 22–31)
CREAT SERPL-MCNC: 0.53 MG/DL — SIGNIFICANT CHANGE UP (ref 0.5–1.3)
GLUCOSE SERPL-MCNC: 84 MG/DL — SIGNIFICANT CHANGE UP (ref 70–99)
MAGNESIUM SERPL-MCNC: 2.8 MG/DL — HIGH (ref 1.6–2.6)
PHOSPHATE SERPL-MCNC: 2.5 MG/DL — SIGNIFICANT CHANGE UP (ref 2.5–4.5)
POTASSIUM SERPL-MCNC: 3.7 MMOL/L — SIGNIFICANT CHANGE UP (ref 3.5–5.3)
POTASSIUM SERPL-SCNC: 3.7 MMOL/L — SIGNIFICANT CHANGE UP (ref 3.5–5.3)
PROT SERPL-MCNC: 6.1 G/DL — SIGNIFICANT CHANGE UP (ref 6–8.3)
SODIUM SERPL-SCNC: 141 MMOL/L — SIGNIFICANT CHANGE UP (ref 135–145)

## 2017-10-06 PROCEDURE — 99233 SBSQ HOSP IP/OBS HIGH 50: CPT | Mod: GC

## 2017-10-06 RX ORDER — GABAPENTIN 400 MG/1
100 CAPSULE ORAL ONCE
Qty: 0 | Refills: 0 | Status: COMPLETED | OUTPATIENT
Start: 2017-10-06 | End: 2017-10-06

## 2017-10-06 RX ORDER — POTASSIUM CHLORIDE 20 MEQ
20 PACKET (EA) ORAL ONCE
Qty: 0 | Refills: 0 | Status: COMPLETED | OUTPATIENT
Start: 2017-10-06 | End: 2017-10-06

## 2017-10-06 RX ORDER — INFLUENZA VIRUS VACCINE 15; 15; 15; 15 UG/.5ML; UG/.5ML; UG/.5ML; UG/.5ML
0.5 SUSPENSION INTRAMUSCULAR ONCE
Qty: 0 | Refills: 0 | Status: DISCONTINUED | OUTPATIENT
Start: 2017-10-06 | End: 2017-10-10

## 2017-10-06 RX ORDER — FOLIC ACID 0.8 MG
1 TABLET ORAL DAILY
Qty: 0 | Refills: 0 | Status: DISCONTINUED | OUTPATIENT
Start: 2017-10-06 | End: 2017-10-09

## 2017-10-06 RX ADMIN — Medication 50 MILLIGRAM(S): at 23:04

## 2017-10-06 RX ADMIN — Medication 2 MILLIGRAM(S): at 18:34

## 2017-10-06 RX ADMIN — Medication 1 MILLIGRAM(S): at 01:10

## 2017-10-06 RX ADMIN — Medication 2 MILLIGRAM(S): at 12:43

## 2017-10-06 RX ADMIN — Medication 50 MILLIGRAM(S): at 10:30

## 2017-10-06 RX ADMIN — Medication 2 MILLIGRAM(S): at 19:54

## 2017-10-06 RX ADMIN — Medication 1 TABLET(S): at 13:54

## 2017-10-06 RX ADMIN — Medication 2 MILLIGRAM(S): at 15:25

## 2017-10-06 RX ADMIN — Medication 1 MILLIGRAM(S): at 13:54

## 2017-10-06 RX ADMIN — Medication 2 MILLIGRAM(S): at 08:29

## 2017-10-06 RX ADMIN — Medication 50 MILLIGRAM(S): at 17:01

## 2017-10-06 RX ADMIN — Medication 2 MILLIGRAM(S): at 22:09

## 2017-10-06 RX ADMIN — Medication 20 MILLIEQUIVALENT(S): at 10:30

## 2017-10-06 RX ADMIN — Medication 2 MILLIGRAM(S): at 02:46

## 2017-10-06 NOTE — PROVIDER CONTACT NOTE (OTHER) - ASSESSMENT
pt is a+ox4, pt states she feels anxious, has auditory ("screaming voices"), visual ("walls cracking") hallucinations, along with tactile disturbance. CIWA score 10, pt on a 1 to 1 pt is a+ox4, pt states she feels anxious, has auditory ("screaming voices"), visual ("walls cracking") hallucinations, along with tactile disturbance. CIWA score 10, pt on a 1 to 1, bp 104/72, hr 75, oral temp 99.5, resps 18, pox 100 on ra

## 2017-10-06 NOTE — PROVIDER CONTACT NOTE (OTHER) - ASSESSMENT
pt is a+ox4, pt is a+ox4, pt states she feels anxious, has auditory ("screaming voices"), visual ("walls cracking") hallucinations, along with tactile disturbance, bp 123/75, hr 79, oral temp 98.2, resps 18, pox 98% on ra

## 2017-10-06 NOTE — H&P ADULT - NSHPREVIEWOFSYSTEMS_GEN_ALL_CORE
REVIEW OF SYSTEMS:  CONSTITUTIONAL: Denies weakness, fevers and chills; +restless  EYES/ENT: +hallucinations, Denies visual changes;  denies vertigo and throat pain   NECK: Denies neck pain and stiffness  RESPIRATORY: Denies shortness of breath, denies cough, wheezing, hemoptysis  CARDIOVASCULAR: Denies chest pain and palpitations  ENDOCRINE: Denies weight loss/gain. Denies cold or heat intolerance  GASTROINTESTINAL: Denies abdominal or epigastric pain. +nausea, vomiting, no hematemesis; Denies diarrhea & constipation. Denies melena and hematochezia.  GENITOURINARY: Denies dysuria, frequency and hematuria  NEUROLOGICAL: Denies numbness and weakness; +tremor  PSYCH: +AVH  SKIN: Denies itching and rashes

## 2017-10-06 NOTE — PROVIDER CONTACT NOTE (OTHER) - ASSESSMENT
bp 110/85, hr 81, oral temp 99.2, reps 19, pox 96% on pt is a+ox4, pt states she feels anxious, has auditory ("screaming voices"), visual ("walls cracking") hallucinations, along with tactile disturbance, bp 110/85, hr 81, oral temp 99.2, reps 19, pox 96% on
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
## 2017-10-06 NOTE — H&P ADULT - PROBLEM SELECTOR PLAN 3
Patient is homeless and has been to rehab multiple times without success. Will discuss goals for this admission and appropriate dispo.   - consult SW/CM Patient is homeless and has been to rehab multiple times without success. Will discuss goals for this admission and appropriate dispo.   - consult SW/CM  - resources for establishing care with PMD

## 2017-10-06 NOTE — H&P ADULT - NSHPSOCIALHISTORY_GEN_ALL_CORE
Patient reports that she is currently homeless, living all over, staying at friends and hotels. She endorses alcohol use history as above, but denies use of other substances including marijuana, cocaine, ecstasy. She is guarded and does not want to answer additional questions.

## 2017-10-06 NOTE — H&P ADULT - ATTENDING COMMENTS
Agree with Dr. Nath really thoughtful history. Librium taper, CIWA PRN. Patient came in w/ etOH in blood (>300), also w/ UTox+ benzos and is actively shaking reporting hallucinations headache, agitation and reciting the CIWA criteria. She has gotten a lot of librium thus far and continues to activate CIWA almost hourly. Crying on exam. Hx of seizures and DTs, will therefore continue for now. She's looking to go to rehab, agree w/ pharmacotherapy as above on d/c. I'm unsure if she will make it through full detox. No signs of autonomic instability. Rest as above.

## 2017-10-06 NOTE — H&P ADULT - ASSESSMENT
Patient is a 33yo woman with PMHx of alcohol abuse and per report multiple admissions for alcohol WD c/b by hallucinations and seizures who presents with intoxication. Patient has history of multiple attempts at alcohol rehabilitation, inpatient psychiatric admissions, suicide attempts, and cutting behaviors with poor social supports and currently homeless. She is admitted for management of alcohol withdrawal and placed on librium taper and CIWA protocol. Patient is a 33yo woman with PMHx of alcohol use d/o and per report multiple admissions for alcohol WD c/b by hallucinations and seizures who presents with intoxication. Patient has history of multiple attempts at alcohol rehabilitation, inpatient psychiatric admissions, suicide attempts, and cutting behaviors with poor social supports and currently homeless. She is admitted for management of alcohol withdrawal and placed on librium taper and CIWA protocol.

## 2017-10-06 NOTE — DIETITIAN INITIAL EVALUATION ADULT. - PROBLEM SELECTOR PLAN 1
Patient with history of multiple admissions for alcohol withdrawal c/b seizures and hallucinations and multiple attempts at rehabilitation.   - High risk librium taper  - CIWA protocol  - MV, folate  - assess patient's level of motivation and primary care supports to consider patient for naltrexone, topomax, and neurontin

## 2017-10-06 NOTE — PROVIDER CONTACT NOTE (OTHER) - ASSESSMENT
pt is a+ox4, pt feeling anxious, stating she is having auditory and visual hallucinations and is feeling nauseous. bp 132/74, hr 77, resps 18, pox 99% on RA pt is a+ox4, pt feeling anxious, stating she is having auditory, tactile and visual hallucinations and is feeling nauseous. bp 132/74, hr 77, resps 18, pox 99% on RA

## 2017-10-06 NOTE — H&P ADULT - PROBLEM SELECTOR PLAN 2
Patient reports history of multiple suicide attempts and hospitalizations for psychiatric reasons. Reports taking effexor, topomax, neurontin, and trazadone but does not following with psychiatrist.   - will reach out to previous psychiatrist for collateral  - med rec

## 2017-10-06 NOTE — DIETITIAN INITIAL EVALUATION ADULT. - OTHER INFO
seen for BMI <19. BMI 18.8. pt states good intake, states she is hungry from being homeless PTA. last BM today.

## 2017-10-06 NOTE — H&P ADULT - NSHPPHYSICALEXAM_GEN_ALL_CORE
GEN: restless, mild diaphoretic  HEENT: PERRLA, EOMI and accommodate, no nystagmus, neck supple, no lymphadenopathy, no JVD  MOUTH: moist mucous membranes, no exudates or lesions   PULM: Clear to auscultation bilaterally, no wheezes, rales, rhonchi  CV: RRR, S1S2, no murmurs, rubs or gallops  Abdominal: Soft, mildly tender in epigastric area to palpation, non-distended, normoactive bowel sounds  Extremities: WWP, No edema, + peripheral pulses  NEURO: AAOx3, moving all four extremities spontaneously; tremor with outstretched hands  Skin: superficial scars over wrist, scattered bruises on upper extremities

## 2017-10-06 NOTE — H&P ADULT - PROBLEM SELECTOR PLAN 1
Patient with history of multiple admissions for alcohol withdrawal c/b seizures and hallucinations and multiple attempts at rehabilitation.   - High risk librium taper  - CIWA protocol  - MV, folate Patient with history of multiple admissions for alcohol withdrawal c/b seizures and hallucinations and multiple attempts at rehabilitation.   - High risk librium taper  - CIWA protocol  - MV, folate  - assess patient's level of motivation and primary care supports to consider patient for naltrexone, topomax, and neurontin

## 2017-10-06 NOTE — DIETITIAN INITIAL EVALUATION ADULT. - PROBLEM SELECTOR PLAN 3
Patient is homeless and has been to rehab multiple times without success. Will discuss goals for this admission and appropriate dispo.   - consult SW/CM  - resources for establishing care with PMD

## 2017-10-06 NOTE — PROVIDER CONTACT NOTE (OTHER) - ACTION/TREATMENT ORDERED:
Ativan 2mg IV push given, MD notified and aware, continue to follow CIWA protocol. continue to monitor

## 2017-10-06 NOTE — PROVIDER CONTACT NOTE (OTHER) - ACTION/TREATMENT ORDERED:
Room and personal belongings checked for safety, pt's lighter and pen taken to security box. MD notified and aware, continue to penny Room and personal belongings checked for safety, pt's lighter and pen taken to security box. MD notified and aware, continue to monitor

## 2017-10-07 LAB
ANION GAP SERPL CALC-SCNC: 11 MMOL/L — SIGNIFICANT CHANGE UP (ref 5–17)
BUN SERPL-MCNC: 5 MG/DL — LOW (ref 7–23)
CALCIUM SERPL-MCNC: 9.3 MG/DL — SIGNIFICANT CHANGE UP (ref 8.4–10.5)
CHLORIDE SERPL-SCNC: 105 MMOL/L — SIGNIFICANT CHANGE UP (ref 96–108)
CO2 SERPL-SCNC: 25 MMOL/L — SIGNIFICANT CHANGE UP (ref 22–31)
CREAT SERPL-MCNC: 0.51 MG/DL — SIGNIFICANT CHANGE UP (ref 0.5–1.3)
GLUCOSE SERPL-MCNC: 89 MG/DL — SIGNIFICANT CHANGE UP (ref 70–99)
MAGNESIUM SERPL-MCNC: 1.7 MG/DL — SIGNIFICANT CHANGE UP (ref 1.6–2.6)
PHOSPHATE SERPL-MCNC: 3.2 MG/DL — SIGNIFICANT CHANGE UP (ref 2.5–4.5)
POTASSIUM SERPL-MCNC: 4.1 MMOL/L — SIGNIFICANT CHANGE UP (ref 3.5–5.3)
POTASSIUM SERPL-SCNC: 4.1 MMOL/L — SIGNIFICANT CHANGE UP (ref 3.5–5.3)
SODIUM SERPL-SCNC: 141 MMOL/L — SIGNIFICANT CHANGE UP (ref 135–145)

## 2017-10-07 PROCEDURE — 99233 SBSQ HOSP IP/OBS HIGH 50: CPT

## 2017-10-07 PROCEDURE — 99291 CRITICAL CARE FIRST HOUR: CPT

## 2017-10-07 PROCEDURE — 99233 SBSQ HOSP IP/OBS HIGH 50: CPT | Mod: GC

## 2017-10-07 RX ORDER — PHENOBARBITAL 60 MG
129.6 TABLET ORAL ONCE
Qty: 0 | Refills: 0 | Status: DISCONTINUED | OUTPATIENT
Start: 2017-10-07 | End: 2017-10-07

## 2017-10-07 RX ORDER — PHENOBARBITAL 60 MG
130 TABLET ORAL ONCE
Qty: 0 | Refills: 0 | Status: DISCONTINUED | OUTPATIENT
Start: 2017-10-07 | End: 2017-10-07

## 2017-10-07 RX ORDER — PHENOBARBITAL 60 MG
64.8 TABLET ORAL EVERY 6 HOURS
Qty: 0 | Refills: 0 | Status: DISCONTINUED | OUTPATIENT
Start: 2017-10-07 | End: 2017-10-07

## 2017-10-07 RX ORDER — MAGNESIUM SULFATE 500 MG/ML
2 VIAL (ML) INJECTION ONCE
Qty: 0 | Refills: 0 | Status: COMPLETED | OUTPATIENT
Start: 2017-10-07 | End: 2017-10-07

## 2017-10-07 RX ORDER — SODIUM CHLORIDE 9 MG/ML
1000 INJECTION, SOLUTION INTRAVENOUS
Qty: 0 | Refills: 0 | Status: COMPLETED | OUTPATIENT
Start: 2017-10-07 | End: 2017-10-07

## 2017-10-07 RX ADMIN — Medication 129.6 MILLIGRAM(S): at 03:34

## 2017-10-07 RX ADMIN — Medication 4 MILLIGRAM(S): at 11:16

## 2017-10-07 RX ADMIN — Medication 2 MILLIGRAM(S): at 10:25

## 2017-10-07 RX ADMIN — Medication 4 MILLIGRAM(S): at 22:02

## 2017-10-07 RX ADMIN — Medication 1 TABLET(S): at 14:09

## 2017-10-07 RX ADMIN — Medication 2 MILLIGRAM(S): at 20:03

## 2017-10-07 RX ADMIN — Medication 2 MILLIGRAM(S): at 09:11

## 2017-10-07 RX ADMIN — Medication 4 MILLIGRAM(S): at 23:05

## 2017-10-07 RX ADMIN — Medication 100 MILLIGRAM(S): at 23:11

## 2017-10-07 RX ADMIN — Medication 2 MILLIGRAM(S): at 15:52

## 2017-10-07 RX ADMIN — Medication 1 MILLIGRAM(S): at 14:09

## 2017-10-07 RX ADMIN — Medication 2 MILLIGRAM(S): at 00:07

## 2017-10-07 RX ADMIN — Medication 4 MILLIGRAM(S): at 14:50

## 2017-10-07 RX ADMIN — Medication 2 MILLIGRAM(S): at 01:01

## 2017-10-07 RX ADMIN — Medication 50 GRAM(S): at 11:26

## 2017-10-07 RX ADMIN — Medication 2 MILLIGRAM(S): at 10:52

## 2017-10-07 RX ADMIN — Medication 2 MILLIGRAM(S): at 03:10

## 2017-10-07 RX ADMIN — Medication 2 MILLIGRAM(S): at 02:02

## 2017-10-07 RX ADMIN — Medication 2 MILLIGRAM(S): at 22:45

## 2017-10-07 RX ADMIN — SODIUM CHLORIDE 150 MILLILITER(S): 9 INJECTION, SOLUTION INTRAVENOUS at 08:27

## 2017-10-07 RX ADMIN — Medication 4 MILLIGRAM(S): at 18:25

## 2017-10-07 RX ADMIN — Medication 130 MILLIGRAM(S): at 04:00

## 2017-10-07 RX ADMIN — Medication 50 MILLIGRAM(S): at 11:47

## 2017-10-07 NOTE — CONSULT NOTE ADULT - ASSESSMENT
Patient is a 33yo woman with PMHx of alcohol use d/o and per report multiple admissions for alcohol WD c/b by hallucinations and seizures who presents with intoxication. Pt ETOH level >300 on admission yesterday even thought her last drink was on 10/5/17 now CIWA 17.

## 2017-10-07 NOTE — PROVIDER CONTACT NOTE (OTHER) - ASSESSMENT
Patient getting agitated,pacing the floor screaming states she needs her trazadone and neurontin for sleep.Patient does not remember the dose of trazadone or neurontin.See vitals.Patient states she is unable to sleep.

## 2017-10-07 NOTE — CONSULT NOTE ADULT - SUBJECTIVE AND OBJECTIVE BOX
CHIEF COMPLAINT:    HPI:  Patient is a 33yo woman with PMHx of alcohol use d/o and per report multiple admissions for alcohol WD c/b by hallucinations and seizures who presents with intoxication. Pt ETOH level >300 on admission yesterday even thought her last drink was on 10/5/17.    Pt currently refusing to speak to me or answer any questions. Pt chart reviewed, pt found in "drug house" intoxicated. Pt demanding ativan hourly because she feels "jittery" and is "hallucinating bad dreams" but refuses to expand on these hallucinations.     CIWA because of the jitteriness and nausea, etc is 17 at this time, however on examination, pt is non-diaphoretic fully oriented and able to respond appropriately.       PAST MEDICAL & SURGICAL HISTORY:  Alcohol use disorder  No significant past surgical history      FAMILY HISTORY:  Family history of alcohol abuse (Father)      SOCIAL HISTORY:  Smoking unknown  EtOH Use: 3 L vodka daily  Marital Status:  Occupation: UNEMPLOYED  Recent Travel: NO  Country of Birth: [place]  Advance Directives:    Allergies    No Known Allergies    Intolerances        HOME MEDICATIONS:    REVIEW OF SYSTEMS:  [x ] Unable to assess ROS because pt refusing to answer questions    OBJECTIVE:  ICU Vital Signs Last 24 Hrs  T(C): 36.4 (07 Oct 2017 03:01), Max: 37.6 (06 Oct 2017 04:52)  T(F): 97.5 (07 Oct 2017 03:01), Max: 99.7 (06 Oct 2017 04:52)  HR: 70 (07 Oct 2017 03:01) (66 - 99)  BP: 125/90 (07 Oct 2017 03:01) (104/72 - 161/81)  BP(mean): --  ABP: --  ABP(mean): --  RR: 18 (07 Oct 2017 03:01) (17 - 21)  SpO2: 100% (07 Oct 2017 03:01) (95% - 100%)        10-05 @ :  -  10-06 @ 07:00  --------------------------------------------------------  IN: 480 mL / OUT: 0 mL / NET: 480 mL    10-06 @ 07:  -  10-07 @ 03:42  --------------------------------------------------------  IN: 2280 mL / OUT: 0 mL / NET: 2280 mL      CAPILLARY BLOOD GLUCOSE          PHYSICAL EXAM:  GENERAL: NAD, well-developed  HEAD: Atraumatic, Normocephalic  EYES: EOMI, PERRLA, conjunctiva and sclera clear  NECK: Supple, No JVD  CHEST/LUNG: Clear to auscultation bilaterally; No wheezes/rales/rhonchi  HEART: Regular rate and rhythm; No murmurs, rubs, or gallops  ABDOMEN: Soft, Nontender, Nondistended; Bowel sounds present  EXTREMITIES:  2+ dP pulses b/l, No clubbing, cyanosis, or edema  PSYCH: reactive affect  NEUROLOGY: AAOx3, non-focal +tremors  SKIN: No rashes or lesions    HOSPITAL MEDICATIONS:  MEDICATIONS  (STANDING):  folic acid 1 milliGRAM(s) Oral daily  influenza   Vaccine 0.5 milliLiter(s) IntraMuscular once  multivitamin 1 Tablet(s) Oral daily  PHENobarbital 64.8 milliGRAM(s) Oral every 6 hours  PHENobarbital 129.6 milliGRAM(s) Oral once    MEDICATIONS  (PRN):  LORazepam   Injectable 2 milliGRAM(s) IV Push every 2 hours PRN CIWA-Ar score increase by 2 points and a total score of 7 or less  LORazepam   Injectable 2 milliGRAM(s) IV Push every 1 hour PRN Symptom-triggered: each CIWA -Ar score 8 or GREATER      LABS:                        11.2   4.1   )-----------( 508      ( 05 Oct 2017 17:43 )             34.7     10-    141  |  102  |  6<L>  ----------------------------<  84  3.7   |  28  |  0.53    Ca    8.2<L>      06 Oct 2017 06:56  Phos  2.5     10  Mg     2.8     10-06    TPro  6.1  /  Alb  3.9  /  TBili  0.3  /  DBili  0.1  /  AST  36  /  ALT  20  /  AlkPhos  40  10-06      Urinalysis Basic - ( 05 Oct 2017 17:42 )    Color: PL Yellow / Appearance: Clear / S.009 / pH: x  Gluc: x / Ketone: Negative  / Bili: Negative / Urobili: Negative   Blood: x / Protein: Negative / Nitrite: Negative   Leuk Esterase: Negative / RBC: x / WBC 0-2 /HPF   Sq Epi: x / Non Sq Epi: x / Bacteria: x

## 2017-10-07 NOTE — CHART NOTE - NSCHARTNOTEFT_GEN_A_CORE
Overnight covering resident called to pt bedside for persistent distress and anxiety. Pt is 31 y/o homeless F who is admitted for alcohol withdrawal. Pt has CIWA scores between 7-11 throughout the course of the day, however endorses persistent anxiety, insomnia and agitation. Pt requests additional doses of Ativan in addition to Trazadone and Neurontin which she says she gets from outside hospital despite not being followed by a behavioral specialist. Currently, pt is requiring hourly PRN doses of Ativan, for a total of 16 mg Ativan & 3 doses Librium in the last 24 hrs. Vitals: Hr 74, Bp 122/70, RR 18 @ 99. On exam pt is anxious appearing watching a show on her cellphone, not exhibiting any signs of autonomic instability including cold sweats, breathing comfortably on room air, ambulating well without gait imbalance. Pt AAO X3 and able to have a conversation with the floor team. Pt is agreeable in the current plan that she will not receive Trazadone and Neurontin while on the CIWA protocol for withdrawal, however she understands the house staff will support her throughout withdrawal, administering medications as per CIWA protocol. Plan discussed w floor team; will continue to monitor.     Gonzalez Pearce MD-[id number]  Department of Internal Medicine  Pager [phone]

## 2017-10-07 NOTE — PROGRESS NOTE ADULT - PROBLEM SELECTOR PLAN 1
Patient with history of multiple admissions for alcohol withdrawal c/b seizures and hallucinations and multiple attempts at rehabilitation. Originally started on librium taper which was escalated to phenobarbital taper by MICU in the setting of elevated CIWA scores and demanding narcotics. Patient's symptoms have been difficult to assess and control given subjective nature of CIWA protocol and lack of objective data to support evidence of complicated withdrawal.   - continue phenobarb 65mg IVq6h  - CIWA protocol  - MV, folate, banana bag alternating with D5 IVF  - assess patient's level of motivation and primary care supports to consider patient for naltrexone, topomax, and neurontin as outpatient Patient with history of multiple admissions for alcohol withdrawal c/b seizures and hallucinations and multiple attempts at rehabilitation. Originally started on librium taper which was escalated to phenobarbital taper by MICU in the setting of elevated CIWA scores and demanding narcotics. Patient's symptoms have been difficult to assess and control given subjective nature of CIWA protocol and lack of objective data to support evidence of complicated withdrawal.   - will switch phenobarbital to high risk ativan taper  - CIWA protocol  - MV, folate, banana bag alternating with D5 IVF  - assess patient's level of motivation and primary care supports to consider patient for naltrexone, topomax, and neurontin as outpatient

## 2017-10-07 NOTE — PROGRESS NOTE ADULT - SUBJECTIVE AND OBJECTIVE BOX
Patient is a 32y old  Female who presents with a chief complaint of alcohol withdrawal (06 Oct 2017 08:09)    SUBJECTIVE / OVERNIGHT EVENTS:  Patient started on librium taper and CIWA with ativan PRN yesterday. CIWA scores of 6-7 throughout the day getting points for Nausea, tremor, anxiety, tactile and auditory hallucinations, and headache. CIWA increased to 10 later in evening for worsening anxiety, at this point patient had received 16mg ativan and three doses of librium. CIWA increased to 17 for worsening anxiety, dry heaving, and worsening auditory and tactile hallucinations. Patient was pacing and screaming demanding trazadone and neurontin. Patient was demanding only "narcotics" for management of her symptoms. MICU was called given elevated CIWA scores of 17 and recommended starting phenobarbital and additional banana bag. Patient was given 260mg phenobarb and then started on 65mg q6h. Throughout this patient did not display any signs of autonomic dysfunction, vital signs were stable, no tachycardia, patient was not having difficulty with ambulation and was not confused during conversations with staff, and was watching TV on her cell phone. In total, patient has received 22mg ativan since yesterday, 3 doses of librium, and 260mg phenobarbital. This morning, patient was *******    MEDICATIONS  (STANDING):  folic acid 1 milliGRAM(s) Oral daily  influenza   Vaccine 0.5 milliLiter(s) IntraMuscular once  multivitamin 1 Tablet(s) Oral daily  multivitamin/thiamine/folic acid in sodium chloride 0.9% 1000 milliLiter(s) (150 mL/Hr) IV Continuous <Continuous>  PHENobarbital Injectable 64.8 milliGRAM(s) IV Push every 6 hours    MEDICATIONS  (PRN):  LORazepam   Injectable 2 milliGRAM(s) IV Push every 2 hours PRN CIWA-Ar score increase by 2 points and a total score of 7 or less  LORazepam   Injectable 2 milliGRAM(s) IV Push every 1 hour PRN Symptom-triggered: each CIWA -Ar score 8 or GREATER      REVIEW OF SYSTEMS:  CONSTITUTIONAL: Denies weakness, fevers and chills  EYES/ENT: Denies visual changes;  denies vertigo and throat pain   NECK: Denies neck pain and stiffness  RESPIRATORY: Denies shortness of breath, denies cough, wheezing, hemoptysis  CARDIOVASCULAR: Denies chest pain and palpitations  ENDOCRINE: Denies weight loss/gain. Denies cold or heat intolerance  GASTROINTESTINAL: Denies abdominal or epigastric pain. denies nausea, vomiting, and hematemesis; Denies diarrhea & constipation. Denies melena and hematochezia.  GENITOURINARY: Denies dysuria, frequency and hematuria  NEUROLOGICAL: Denies numbness and weakness  SKIN: Denies itching and rashes    T(C): 36.6 (10-07-17 @ 06:01), Max: 37.5 (10-06-17 @ 16:00)  HR: 62 (10-07-17 @ 07:03) (62 - 99)  BP: 109/73 (10-07-17 @ 07:03) (104/72 - 161/81)  RR: 18 (10-07-17 @ 07:03) (18 - 21)  SpO2: 99% (10-07-17 @ 07:03) (95% - 100%)      PHYSICAL EXAM:  GENERAL: NAD, well-developed  HEAD:  Atraumatic, Normocephalic  EYES: EOMI, PERRLA, conjunctiva and sclera clear  NECK: Supple, No JVD  CHEST/LUNG: Clear to auscultation bilaterally; No wheeze  HEART: Regular rate and rhythm; No murmurs, rubs, or gallops  ABDOMEN: Soft, Nontender, Nondistended; Bowel sounds present  EXTREMITIES:  2+ Peripheral Pulses, No clubbing, cyanosis, or edema  PSYCH: AAOx3  NEUROLOGY: non-focal  SKIN: No rashes or lesions    CAPILLARY BLOOD GLUCOSE        I&O's Summary    06 Oct 2017 07:01  -  07 Oct 2017 07:00  --------------------------------------------------------  IN: 2760 mL / OUT: 0 mL / NET: 2760 mL        LABS                        11.2   4.1   )-----------( 508      ( 05 Oct 2017 17:43 )             34.7     10-07    141  |  105  |  5<L>  ----------------------------<  89  4.1   |  25  |  0.51    Ca    9.3      07 Oct 2017 03:34  Phos  3.2     10-  Mg     1.7     10-    TPro  6.1  /  Alb  3.9  /  TBili  0.3  /  DBili  0.1  /  AST  36  /  ALT  20  /  AlkPhos  40  10      CARDIAC MARKERS ( 05 Oct 2017 17:43 )  x     / x     / 533 U/L / x     / x          Urinalysis Basic - ( 05 Oct 2017 17:42 )    Color: PL Yellow / Appearance: Clear / S.009 / pH: x  Gluc: x / Ketone: Negative  / Bili: Negative / Urobili: Negative   Blood: x / Protein: Negative / Nitrite: Negative   Leuk Esterase: Negative / RBC: x / WBC 0-2 /HPF   Sq Epi: x / Non Sq Epi: x / Bacteria: x Patient is a 32y old  Female who presents with a chief complaint of alcohol withdrawal (06 Oct 2017 08:09)    SUBJECTIVE / OVERNIGHT EVENTS:  Patient started on librium taper and CIWA with ativan PRN yesterday. CIWA scores of 6-7 throughout the day getting points for Nausea, tremor, anxiety, tactile and auditory hallucinations, and headache. CIWA increased to 10 later in evening for worsening anxiety, at this point patient had received 16mg ativan and three doses of librium. CIWA increased to 17 for worsening anxiety, dry heaving, and worsening auditory and tactile hallucinations. Patient was pacing and screaming demanding trazadone and neurontin. Patient was demanding only "narcotics" for management of her symptoms. MICU was called given elevated CIWA scores of 17 and recommended starting phenobarbital and additional banana bag. Patient was given 260mg phenobarb and then started on 65mg q6h. Throughout this patient did not display any signs of autonomic dysfunction, vital signs were stable, no tachycardia, patient was not having difficulty with ambulation and was not confused during conversations with staff, and was watching TV on her cell phone. In total, patient has received 22mg ativan since yesterday, 3 doses of librium, and 260mg phenobarbital. This morning, patient was sleeping comfortably.     MEDICATIONS  (STANDING):  folic acid 1 milliGRAM(s) Oral daily  influenza   Vaccine 0.5 milliLiter(s) IntraMuscular once  multivitamin 1 Tablet(s) Oral daily  multivitamin/thiamine/folic acid in sodium chloride 0.9% 1000 milliLiter(s) (150 mL/Hr) IV Continuous <Continuous>  PHENobarbital Injectable 64.8 milliGRAM(s) IV Push every 6 hours    MEDICATIONS  (PRN):  LORazepam   Injectable 2 milliGRAM(s) IV Push every 2 hours PRN CIWA-Ar score increase by 2 points and a total score of 7 or less  LORazepam   Injectable 2 milliGRAM(s) IV Push every 1 hour PRN Symptom-triggered: each CIWA -Ar score 8 or GREATER      REVIEW OF SYSTEMS: deferred as patient was sleeping comfortably    T(C): 36.6 (10-07-17 @ 06:01), Max: 37.5 (10-06-17 @ 16:00)  HR: 62 (10-07-17 @ 07:03) (62 - 99)  BP: 109/73 (10-07-17 @ 07:03) (104/72 - 161/81)  RR: 18 (10-07-17 @ 07:03) (18 - 21)  SpO2: 99% (10-07-17 @ 07:03) (95% - 100%)      PHYSICAL EXAM: (deferred will f/u)  GENERAL: NAD, well-developed  HEAD:  Atraumatic, Normocephalic  EYES: EOMI, PERRLA, conjunctiva and sclera clear  NECK: Supple, No JVD  CHEST/LUNG: Clear to auscultation bilaterally; No wheeze  HEART: Regular rate and rhythm; No murmurs, rubs, or gallops  ABDOMEN: Soft, Nontender, Nondistended; Bowel sounds present  EXTREMITIES:  2+ Peripheral Pulses, No clubbing, cyanosis, or edema  PSYCH: AAOx3  NEUROLOGY: non-focal  SKIN: No rashes or lesions    CAPILLARY BLOOD GLUCOSE        I&O's Summary    06 Oct 2017 07:01  -  07 Oct 2017 07:00  --------------------------------------------------------  IN: 2760 mL / OUT: 0 mL / NET: 2760 mL        LABS                        11.2   4.1   )-----------( 508      ( 05 Oct 2017 17:43 )             34.7     10    141  |  105  |  5<L>  ----------------------------<  89  4.1   |  25  |  0.51    Ca    9.3      07 Oct 2017 03:34  Phos  3.2     10-  Mg     1.7     10-07    TPro  6.1  /  Alb  3.9  /  TBili  0.3  /  DBili  0.1  /  AST  36  /  ALT  20  /  AlkPhos  40  10-06      CARDIAC MARKERS ( 05 Oct 2017 17:43 )  x     / x     / 533 U/L / x     / x          Urinalysis Basic - ( 05 Oct 2017 17:42 )    Color: PL Yellow / Appearance: Clear / S.009 / pH: x  Gluc: x / Ketone: Negative  / Bili: Negative / Urobili: Negative   Blood: x / Protein: Negative / Nitrite: Negative   Leuk Esterase: Negative / RBC: x / WBC 0-2 /HPF   Sq Epi: x / Non Sq Epi: x / Bacteria: x

## 2017-10-07 NOTE — PROGRESS NOTE ADULT - ASSESSMENT
Patient is a 33yo woman with PMHx of alcohol use d/o and per report multiple admissions for alcohol WD c/b by hallucinations and seizures who presents with intoxication. Patient has history of multiple attempts at alcohol rehabilitation, inpatient psychiatric admissions, suicide attempts, and cutting behaviors with poor social supports and currently homeless. Patient was admitted for management of alcohol withdrawal and placed on librium taper and CIWA protocol. Now with worsening CIWA scores overnight in the setting of demanding narcotics, MICU consulted and started on phenobarbital.

## 2017-10-07 NOTE — PROVIDER CONTACT NOTE (OTHER) - BACKGROUND
pt admitted from [facility], pt on [facility] protocol hx: alcohol intoxication pt admitted from [facility], pt on CIWA protocol and on constant observation. hx: alcohol intoxication

## 2017-10-07 NOTE — CHART NOTE - NSCHARTNOTEFT_GEN_A_CORE
10/7/2017     [id number]    Critical Care Note--Medicine Attending    See Rapid Response Sheet for detailed assessment and plan.  I personally provided 30 minutes of critical care time for alcohol withdrawal evaluated with CIWA assessment.  Patient treated with initiation of Librium taper at 100 mg with Ativan for breakthrough.  MICU NP in attendance evaluated patient with CIWA initially of 22.  Patient at present with NO evidence of delirium.  Care reviewed with house staff.  Family not in attendance and could not be reached at present.  History of recidivism as above with long term guarded prognosis.  On 1:1.  Psychiatry contacted.  Patient agrees with plan/care as above.    Erick Bradley MD  Hospital Medicine Division

## 2017-10-07 NOTE — CONSULT NOTE ADULT - PROBLEM SELECTOR RECOMMENDATION 9
- CIWA 10 -> 17  - since patient is A&O x 3 and demanding ativan, hemodynamically stable not a MICU candidate at this time  - change librium taper to standing phenobarbital 130 x 1 then 65 mg q6  - if necessary, can given diazepam 10mg x1 if refusing all other meds.  - psych eval for capacity in am, as pt refusing all meds except ativan and she has a known hx of benzo abuse.

## 2017-10-07 NOTE — PROVIDER CONTACT NOTE (OTHER) - ASSESSMENT
pt very agitated, restless, CIWA score 15, pt inducing vomiting, stating she wants to leave hospital and go to nearest motel "now", bp 118/97, hr 97, oral temp 97.9, resps 18, pox 99% on ra

## 2017-10-07 NOTE — CHART NOTE - NSCHARTNOTEFT_GEN_A_CORE
RRT Note - MAR    RRT called for agitation.  Patient on CIWA symptom-triggered for alcohol withdrawal and has been persistently scoring high.  Received 22mg ativan in past 24 hours.  Patient awake, yelling for ativan, trying to get out of bed, no evidence of delerium.  MICU has been consulted and evaluated earlier in the day for elevated CIWA scores.   Patient given ativan 4mg IV x 1 and placed on librium taper.  Psychiatry consulted, agree with plan, will see patient in morning.  Remains on 1:1.  Plan discussed with hospitalist, night float intern, primary RN.     Faviola Hawkins MD  MAR - Night   pager: [phone]

## 2017-10-07 NOTE — PROGRESS NOTE ADULT - PROBLEM SELECTOR PLAN 2
Patient reports history of multiple suicide attempts and hospitalizations for psychiatric reasons. Reports taking effexor, topomax, neurontin, and trazadone but does not following with psychiatrist. Demanding neurontin and trazadone for sleep.   - patient has recently been prescribed topomax, neurontin, and trazadone by physicians from [facility]  - will discuss restarting neurontin and trazadone Patient reports history of multiple suicide attempts and hospitalizations for psychiatric reasons. Reports taking effexor, topomax, neurontin, and trazadone but does not following with psychiatrist. Demanding neurontin and trazadone for sleep.   - patient has recently been prescribed topomax, neurontin, and trazadone by physicians from [facility]  - will continue to hold neurontin and trazadone as patient is on large doses of benzos and barbs

## 2017-10-07 NOTE — PROGRESS NOTE ADULT - ATTENDING COMMENTS
Patient seen and examined.  Agree with resident note.  Meds, labs and vitals reviewed.  Patient with long standing alcohol history, with episodes of psych hospitalizations, homeless, presents with alcohol withdrawal.   Patient keeps triggering CIWA, with high scores.  However, concern that complaints are all subjective and not objective.  No evidence of hemodynamic instability, and patient with known history of benzo abuse, requesting specific doses of Ativan.  Psych eval pending.  Continue with CIWA Ativan taper

## 2017-10-07 NOTE — CHART NOTE - NSCHARTNOTEFT_GEN_A_CORE
MICU follow up for c/o ETOH withdrawal    Pt is a 32 yr female with PMHx of ETOH abuse with multiple ETOH withdrawal, seizures. Currently admitted for ETOH intoxication with withdrawal. last drink reported 10/5/17. Pt has been placed on CIWA scale receiving tapering doses of ativan with rescue doses of phenobarb 130 mg IV at 0400 after 129.6mg P.O. at 0300. Currently patient with moderate agitation, MICU called for follow up evaluation    Upon arrival pt found to be sitting on bedside stating she was anxious, oriented x4, c/o visual hallucinations with "bugs on my legs" +3 asterixis. Pt is non diaphoretic. follows commands well   /76 HR 76 RR 22   breaths sounds clear bilaterally   cor with RRR S1/S2    Impression:  ETOH withdrawal    Plan:  banana  bag 1st IV q d  continue with CIWA protocol and ativan dosing  ativan 4 mg IVP now (given)  librium 50 mg p.o. x 1 now (given)  monitor CMP with Mg++/PO--4 q 12 hr   eval LFT  psych eval  please call if further assistance needed    Discussed plan with medical team 2 MICU follow up for c/o ETOH withdrawal    Pt is a 32 yr female with PMHx of ETOH abuse with multiple ETOH withdrawal, seizures. Currently admitted for ETOH intoxication with withdrawal. last drink reported 10/5/17. Pt has been placed on CIWA scale receiving tapering doses of ativan with rescue doses of phenobarb 130 mg IV at 0400 after 129.6mg P.O. at 0300. Currently patient with moderate agitation, MICU called for follow up evaluation    Upon arrival pt found to be sitting on bedside stating she was anxious, oriented x4, c/o visual hallucinations with "bugs on my legs" +3 asterixis. Pt is non diaphoretic. follows commands well   /76 HR 76 RR 22   breaths sounds clear bilaterally   cor with RRR S1/S2    Impression:  ETOH withdrawal    Plan:  banana  bag 1st IV q d  continue with CIWA protocol and ativan dosing  ativan 4 mg IVP now (given)  librium 50 mg p.o. x 1 now (given)  monitor CMP with Mg++/PO--4 q 12 hr   eval LFT  obtain tox screen  psych eval  please call if further assistance needed    Discussed plan with medical team 2

## 2017-10-07 NOTE — PROGRESS NOTE ADULT - PROBLEM SELECTOR PLAN 3
Patient is homeless and has been to rehab multiple times without success. Will discuss goals for this admission and appropriate dispo.    - SW/CM following, appreciate recs  - resources for establishing care with PMD

## 2017-10-07 NOTE — PROVIDER CONTACT NOTE (OTHER) - ASSESSMENT
pt is a+ox4, pt very agitated, stating she is having auditory and visual disturbances. pt pacing in hallway, crying. bp 134/75, hr 90, oral temp 97.8, resps 18, pox 95% on ra

## 2017-10-07 NOTE — PROVIDER CONTACT NOTE (OTHER) - ASSESSMENT
pt is a+ox4, pt crying, pt pacing in hallway, sitting on floor pt is a+ox4, pt crying, pt pacing in hallway, sitting on hallway floor, pt having visual, auditory hallucinations and tactile disturbances. CIWA score 24. bp 134/75, hr 90, oral temp 97.8, resps 18, pox 95% on ra

## 2017-10-07 NOTE — CONSULT NOTE ADULT - ATTENDING COMMENTS
32F Hx of multiple ETOH abuse admission, Alcohol withdrawal syndrome presents with intoxication (last drink 10/5 AM) ETOH level >365 mg/dl receive 10 mg total Ativan since admission for CIWA 17. she is AxO X 3 and FC x 4 demanding to give more Ativan for pin and needle sensation of the body. CIWA 8-9.  - Phenobarb protocol  - Ativan vs. Diazepam prn  - Banana bag iv hydration

## 2017-10-07 NOTE — CHART NOTE - NSCHARTNOTEFT_GEN_A_CORE
Patient severely aggitated  around 10:30pm. CIWA score of 22. Patient's regime was changed to librium taper and will continue on constant observation. Psych was consulted and will see patient first thing in the morning. They agreed with the taper and constant observation. They did not recommend any other medication adjustment at this time. Patient severely agitated  around 10:30pm. CIWA score of 22. Patient's regime was changed to librium taper and will continue on constant observation. Psych was consulted and will see patient first thing in the morning. They agreed with the taper and constant observation. They did not recommend any other medication adjustment at this time.

## 2017-10-07 NOTE — PROVIDER CONTACT NOTE (OTHER) - ASSESSMENT
Patient is awake,restless,eats food then vomits.C/O headache 7/10,nauseous,+ auditory hallucinations hears people laughing hard time concentrating on anything.feels sweaty,pins and needles very bad as per patient.See CIWA assessment.See vitals.Patient received ativan 10 mg IV from 1900 till now.

## 2017-10-08 DIAGNOSIS — F33.1 MAJOR DEPRESSIVE DISORDER, RECURRENT, MODERATE: ICD-10-CM

## 2017-10-08 DIAGNOSIS — F60.3 BORDERLINE PERSONALITY DISORDER: ICD-10-CM

## 2017-10-08 DIAGNOSIS — F43.10 POST-TRAUMATIC STRESS DISORDER, UNSPECIFIED: ICD-10-CM

## 2017-10-08 LAB
ALBUMIN SERPL ELPH-MCNC: 4.2 G/DL — SIGNIFICANT CHANGE UP (ref 3.3–5)
ALP SERPL-CCNC: 46 U/L — SIGNIFICANT CHANGE UP (ref 40–120)
ALT FLD-CCNC: 23 U/L RC — SIGNIFICANT CHANGE UP (ref 10–45)
ANION GAP SERPL CALC-SCNC: 13 MMOL/L — SIGNIFICANT CHANGE UP (ref 5–17)
ANION GAP SERPL CALC-SCNC: 13 MMOL/L — SIGNIFICANT CHANGE UP (ref 5–17)
ANION GAP SERPL CALC-SCNC: 16 MMOL/L — SIGNIFICANT CHANGE UP (ref 5–17)
AST SERPL-CCNC: 26 U/L — SIGNIFICANT CHANGE UP (ref 10–40)
BILIRUB SERPL-MCNC: 0.3 MG/DL — SIGNIFICANT CHANGE UP (ref 0.2–1.2)
BUN SERPL-MCNC: 3 MG/DL — LOW (ref 7–23)
BUN SERPL-MCNC: 4 MG/DL — LOW (ref 7–23)
BUN SERPL-MCNC: 4 MG/DL — LOW (ref 7–23)
CALCIUM SERPL-MCNC: 8.7 MG/DL — SIGNIFICANT CHANGE UP (ref 8.4–10.5)
CALCIUM SERPL-MCNC: 9.1 MG/DL — SIGNIFICANT CHANGE UP (ref 8.4–10.5)
CALCIUM SERPL-MCNC: 9.9 MG/DL — SIGNIFICANT CHANGE UP (ref 8.4–10.5)
CHLORIDE SERPL-SCNC: 100 MMOL/L — SIGNIFICANT CHANGE UP (ref 96–108)
CHLORIDE SERPL-SCNC: 102 MMOL/L — SIGNIFICANT CHANGE UP (ref 96–108)
CHLORIDE SERPL-SCNC: 104 MMOL/L — SIGNIFICANT CHANGE UP (ref 96–108)
CO2 SERPL-SCNC: 21 MMOL/L — LOW (ref 22–31)
CO2 SERPL-SCNC: 22 MMOL/L — SIGNIFICANT CHANGE UP (ref 22–31)
CO2 SERPL-SCNC: 26 MMOL/L — SIGNIFICANT CHANGE UP (ref 22–31)
CREAT SERPL-MCNC: 0.46 MG/DL — LOW (ref 0.5–1.3)
CREAT SERPL-MCNC: 0.63 MG/DL — SIGNIFICANT CHANGE UP (ref 0.5–1.3)
CREAT SERPL-MCNC: 0.64 MG/DL — SIGNIFICANT CHANGE UP (ref 0.5–1.3)
GAS PNL BLDA: SIGNIFICANT CHANGE UP
GLUCOSE SERPL-MCNC: 84 MG/DL — SIGNIFICANT CHANGE UP (ref 70–99)
GLUCOSE SERPL-MCNC: 86 MG/DL — SIGNIFICANT CHANGE UP (ref 70–99)
GLUCOSE SERPL-MCNC: 89 MG/DL — SIGNIFICANT CHANGE UP (ref 70–99)
LACTATE SERPL-SCNC: 0.5 MMOL/L — LOW (ref 0.7–2)
MAGNESIUM SERPL-MCNC: 1.5 MG/DL — LOW (ref 1.6–2.6)
MAGNESIUM SERPL-MCNC: 1.8 MG/DL — SIGNIFICANT CHANGE UP (ref 1.6–2.6)
PHOSPHATE SERPL-MCNC: 3.8 MG/DL — SIGNIFICANT CHANGE UP (ref 2.5–4.5)
PHOSPHATE SERPL-MCNC: 3.9 MG/DL — SIGNIFICANT CHANGE UP (ref 2.5–4.5)
POTASSIUM SERPL-MCNC: 3.6 MMOL/L — SIGNIFICANT CHANGE UP (ref 3.5–5.3)
POTASSIUM SERPL-MCNC: 3.6 MMOL/L — SIGNIFICANT CHANGE UP (ref 3.5–5.3)
POTASSIUM SERPL-MCNC: 4 MMOL/L — SIGNIFICANT CHANGE UP (ref 3.5–5.3)
POTASSIUM SERPL-SCNC: 3.6 MMOL/L — SIGNIFICANT CHANGE UP (ref 3.5–5.3)
POTASSIUM SERPL-SCNC: 3.6 MMOL/L — SIGNIFICANT CHANGE UP (ref 3.5–5.3)
POTASSIUM SERPL-SCNC: 4 MMOL/L — SIGNIFICANT CHANGE UP (ref 3.5–5.3)
PROT SERPL-MCNC: 7 G/DL — SIGNIFICANT CHANGE UP (ref 6–8.3)
SODIUM SERPL-SCNC: 139 MMOL/L — SIGNIFICANT CHANGE UP (ref 135–145)

## 2017-10-08 PROCEDURE — 99233 SBSQ HOSP IP/OBS HIGH 50: CPT

## 2017-10-08 PROCEDURE — 93010 ELECTROCARDIOGRAM REPORT: CPT

## 2017-10-08 PROCEDURE — 99233 SBSQ HOSP IP/OBS HIGH 50: CPT | Mod: GC

## 2017-10-08 RX ORDER — NICOTINE POLACRILEX 2 MG
1 GUM BUCCAL DAILY
Qty: 0 | Refills: 0 | Status: DISCONTINUED | OUTPATIENT
Start: 2017-10-08 | End: 2017-10-19

## 2017-10-08 RX ORDER — GABAPENTIN 400 MG/1
400 CAPSULE ORAL
Qty: 0 | Refills: 0 | Status: DISCONTINUED | OUTPATIENT
Start: 2017-10-08 | End: 2017-10-08

## 2017-10-08 RX ORDER — TOPIRAMATE 25 MG
200 TABLET ORAL
Qty: 0 | Refills: 0 | Status: DISCONTINUED | OUTPATIENT
Start: 2017-10-08 | End: 2017-10-09

## 2017-10-08 RX ORDER — TRAZODONE HCL 50 MG
100 TABLET ORAL AT BEDTIME
Qty: 0 | Refills: 0 | Status: DISCONTINUED | OUTPATIENT
Start: 2017-10-08 | End: 2017-10-08

## 2017-10-08 RX ORDER — VENLAFAXINE HCL 75 MG
225 CAPSULE, EXT RELEASE 24 HR ORAL DAILY
Qty: 0 | Refills: 0 | Status: DISCONTINUED | OUTPATIENT
Start: 2017-10-08 | End: 2017-10-09

## 2017-10-08 RX ADMIN — Medication 2 MILLIGRAM(S): at 20:06

## 2017-10-08 RX ADMIN — GABAPENTIN 400 MILLIGRAM(S): 400 CAPSULE ORAL at 12:46

## 2017-10-08 RX ADMIN — Medication 100 MILLIGRAM(S): at 05:10

## 2017-10-08 RX ADMIN — Medication 4 MILLIGRAM(S): at 22:15

## 2017-10-08 RX ADMIN — Medication 1 PATCH: at 13:00

## 2017-10-08 RX ADMIN — Medication 4 MILLIGRAM(S): at 17:43

## 2017-10-08 RX ADMIN — Medication 2 MILLIGRAM(S): at 19:01

## 2017-10-08 RX ADMIN — Medication 2 MILLIGRAM(S): at 10:36

## 2017-10-08 RX ADMIN — Medication 1 MILLIGRAM(S): at 12:45

## 2017-10-08 RX ADMIN — Medication 2 MILLIGRAM(S): at 02:04

## 2017-10-08 RX ADMIN — Medication 4 MILLIGRAM(S): at 12:51

## 2017-10-08 RX ADMIN — Medication 225 MILLIGRAM(S): at 12:48

## 2017-10-08 RX ADMIN — Medication 4 MILLIGRAM(S): at 13:46

## 2017-10-08 RX ADMIN — Medication 2 MILLIGRAM(S): at 05:57

## 2017-10-08 RX ADMIN — Medication 1 TABLET(S): at 12:41

## 2017-10-08 RX ADMIN — GABAPENTIN 400 MILLIGRAM(S): 400 CAPSULE ORAL at 19:10

## 2017-10-08 RX ADMIN — Medication 100 MILLIGRAM(S): at 21:33

## 2017-10-08 RX ADMIN — Medication 2 MILLIGRAM(S): at 08:39

## 2017-10-08 RX ADMIN — Medication 200 MILLIGRAM(S): at 19:03

## 2017-10-08 RX ADMIN — Medication 2 MILLIGRAM(S): at 07:24

## 2017-10-08 NOTE — PROGRESS NOTE ADULT - SUBJECTIVE AND OBJECTIVE BOX
Patient is a 32y old  Female who presents with a chief complaint of alcohol withdrawal (06 Oct 2017 08:09)      SUBJECTIVE / OVERNIGHT EVENTS:  Patient was very agitated overnight. RRT was called for CIWA = 22. She was given lorazepam and started on librium taper. She is now calm and sedated. CIWA now 8-14    REVIEW OF SYSTEMS:    CONSTITUTIONAL: No weakness, fevers or chills  EYES/ENT: No visual changes;  No vertigo or throat pain   NECK: No pain or stiffness  RESPIRATORY: No cough, wheezing, hemoptysis; No shortness of breath  CARDIOVASCULAR: No chest pain or palpitations  GASTROINTESTINAL: No abdominal pain, nausea/vomiting/diarrhea  GENITOURINARY: No dysuria, frequency or hematuria  NEUROLOGICAL: No numbness or weakness  SKIN: No itching, burning, rashes, or lesions   All other review of systems is negative unless indicated above.    MEDICATIONS  (STANDING):  folic acid 1 milliGRAM(s) Oral daily  gabapentin 400 milliGRAM(s) Oral four times a day  influenza   Vaccine 0.5 milliLiter(s) IntraMuscular once  LORazepam   Injectable   IV Push   multivitamin 1 Tablet(s) Oral daily  nicotine - 21 mG/24Hr(s) Patch 1 patch Transdermal daily  topiramate 200 milliGRAM(s) Oral two times a day  traZODone 100 milliGRAM(s) Oral at bedtime  venlafaxine XR. 225 milliGRAM(s) Oral daily    MEDICATIONS  (PRN):  LORazepam   Injectable 2 milliGRAM(s) IV Push every 1 hour PRN Symptom-triggered: each CIWA -Ar score 8 or GREATER  traZODone 100 milliGRAM(s) Oral at bedtime PRN insomnia if not asleep in 1 hour after receiving atanding dose of trazodone      CAPILLARY BLOOD GLUCOSE        I&O's Summary    07 Oct 2017 07:01  -  08 Oct 2017 07:00  --------------------------------------------------------  IN: 6520 mL / OUT: 0 mL / NET: 6520 mL        PHYSICAL EXAM:  GENERAL: NAD, laying in bed  EYES:  PERRLA, conjunctiva and sclera clear  NECK: Supple, No JVD  CHEST/LUNG: Clear to auscultation bilaterally; No wheeze  HEART: Regular rate and rhythm; No murmurs, rubs, or gallops  ABDOMEN: Soft, Nontender, Nondistended; Bowel sounds present  EXTREMITIES:  No clubbing, cyanosis, or edema  PSYCH: normal mood/affect  NEUROLOGY: AAOx3; no focal deficits  SKIN: No rashes or lesions    LABS:    10-08    139  |  104  |  4<L>  ----------------------------<  89  3.6   |  22  |  0.46<L>    Ca    8.7      08 Oct 2017 06:42  Phos  3.9     10-08  Mg     1.5     10-08                  RADIOLOGY & ADDITIONAL TESTS:    Imaging Personally Reviewed:    Consultant(s) Notes Reviewed:  Psychiatry    Care Discussed with Consultants/Other Providers:

## 2017-10-08 NOTE — BEHAVIORAL HEALTH ASSESSMENT NOTE - RISK ASSESSMENT
risk factors: homelessness, poor social support, past history of attempts and self injurious behaviors, impulsivity, noncomplaint with treatment, depressed mood, current etoh withdrawal and reports tremor anxiety, hallucinations  protective factors: currently denies si/hi with no i/i/p, no tita, fair sleep and appetite, is future oriented wanting to go to rehab

## 2017-10-08 NOTE — BEHAVIORAL HEALTH ASSESSMENT NOTE - NSBHCHARTREVIEWLAB_PSY_A_CORE FT
10-08    139  |  104  |  4<L>  ----------------------------<  89  3.6   |  22  |  0.46<L>    Ca    8.7      08 Oct 2017 06:42  Phos  3.9     10-08  Mg     1.5     10-08

## 2017-10-08 NOTE — PROVIDER CONTACT NOTE (CHANGE IN STATUS NOTIFICATION) - ASSESSMENT
Pt restless, agitated, screaming in bed with pt verbalizing increased withdrawal symptoms. PT VS bp 131/96 HR 89 temp 98.5 POX 99 ra,respiratory rate 17/mt.

## 2017-10-08 NOTE — PROGRESS NOTE ADULT - PROBLEM SELECTOR PLAN 2
-extensive and complicated psyhiatric history  -per Psychiatry recs, will continue with home meds - trazodone, gabapentin, topiramate, venlafaxine

## 2017-10-08 NOTE — PROGRESS NOTE ADULT - ASSESSMENT
33yo woman with PMHx of alcohol use d/o and per report multiple admissions for alcohol WD c/b by hallucinations and seizures who presents with intoxication. Patient has history of multiple attempts at alcohol rehabilitation, inpatient psychiatric admissions, suicide attempts, and cutting behaviors with poor social supports and currently homeless. Patient was admitted for management of alcohol withdrawal and placed on librium taper and CIWA protocol. Course complicated by withdrawals and agitation.

## 2017-10-08 NOTE — BEHAVIORAL HEALTH ASSESSMENT NOTE - NSBHCHARTREVIEWINVESTIGATE_PSY_A_CORE FT
Ventricular Rate 94 BPM    Atrial Rate 94 BPM    P-R Interval 138 ms    QRS Duration 88 ms     ms    QTc 485 ms    P Axis 64 degrees    R Axis 58 degrees    T Axis 57 degrees    Diagnosis Line NORMAL SINUS RHYTHM  PROLONGED QT  ABNORMAL ECG

## 2017-10-08 NOTE — BEHAVIORAL HEALTH ASSESSMENT NOTE - DETAILS
patient vague about suicidal history.  States she has several attempts, but would not talk indetails see hpi headache, tremor

## 2017-10-08 NOTE — BEHAVIORAL HEALTH ASSESSMENT NOTE - SUMMARY
31 year old female with a past history of mdd, borderline personality disorder, etoh disorder presents after being found "down in a drug" house".  Etoh level on admission was 365 and was started on ciwa, librium taper.  Ciwa 9-17 and micu was consulted.  Currently patient appears comfortable, ?exaggerating sxs but reportedly does have a history of seizures and dts and is therefore high risk.  Is currently on effexor, topamax, neurontin, trazodone but none of them were prescribed on admission.  Patient is labile, reports depression, but not suicidal or homicidal, but does have history of impulsivity and self injurious behavior and is at risk of self harm if needs not met while in the hospital
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
## 2017-10-08 NOTE — BEHAVIORAL HEALTH ASSESSMENT NOTE - NSBHCONSULTSUBSTANCEALCOHOL_PSY_A_CORE FT
continue librium taper at 100mg as per medicine taper as patient is at hisgh risk  start nicotine patch would change to ativan 4mg po/iv q4hrs (hold for sedation)x 6 doses then 3mg po/iv q4hrs x6 doses then 2mg po/iv [id number] doses then 1mg po/iv x6 doses then 1mg po/iv q6hrs x4 doses then 0.5mg q6hrs x4 doses as patient is at high risk and requiring large amounts of benzos (likely has tolerance given multiple past detoxs)  start nicotine patch

## 2017-10-08 NOTE — BEHAVIORAL HEALTH ASSESSMENT NOTE - NSBHCONSULTMEDS_PSY_A_CORE FT
start trazodone 200mg hs prn insomnia  start effexor xr 225mg daily  start neurontin 400mg 4 daily  start topamax 200mg bid  agree with minerva, librium 100mg taper start trazodone 200mg hs prn insomnia  start effexor xr 225mg daily  start neurontin 400mg 4 daily  start topamax 200mg bid

## 2017-10-08 NOTE — BEHAVIORAL HEALTH ASSESSMENT NOTE - NSBHCONSULTMEDOTHER_PSY_A_CORE FT
ativan 3mg iv q2hrs if ciwa inc by 2 or above 8 ativan 3mg iv q2hrs if ciwa increases by 2pts or above 8

## 2017-10-08 NOTE — BEHAVIORAL HEALTH ASSESSMENT NOTE - NSBHCHARTREVIEWVS_PSY_A_CORE FT
ICU Vital Signs Last 24 Hrs  T(C): 36.7 (08 Oct 2017 08:00), Max: 36.9 (07 Oct 2017 22:39)  T(F): 98 (08 Oct 2017 08:00), Max: 98.5 (07 Oct 2017 22:39)  HR: 55 (08 Oct 2017 08:00) (55 - 102)  BP: 129/91 (08 Oct 2017 08:00) (105/66 - 144/96)  BP(mean): --  ABP: --  ABP(mean): --  RR: 18 (08 Oct 2017 08:00) (17 - 158)  SpO2: 99% (08 Oct 2017 08:00) (18% - 100%)

## 2017-10-08 NOTE — PROVIDER CONTACT NOTE (OTHER) - ASSESSMENT
Patient attempting to fall asleep,but easiyl arousable,Vitals BP-125/70,heart rate 70/mt,O 2 sat-995 at room air.Temp-97F.

## 2017-10-08 NOTE — CHART NOTE - NSCHARTNOTEFT_GEN_A_CORE
RRT called for change in mental status. This is a 31 y/o F admitted for alcohol withdrawal. Presently, BP 90s/50s, RR 10, saturating well on minimal nasal cannula. She was administered 4mg ativan intravenously within the last hour. She is arousable and with improving mental status during RRT. She was bolused 1L, with plan for additional 1L thereafter. Blood gas reviewed and w/o hypoxia or hypercarbia. Plan to discontinue standing Ativan and discuss plan of care with psychiatry. Plan to discontinue Trazodone and Gabapentin, which were started today and may be contributing to sedation. EKG reviewed, NSR . She will remain on high risk CIWA for now. Plan of care discussed with primary NF intern.

## 2017-10-08 NOTE — PROGRESS NOTE ADULT - PROBLEM SELECTOR PLAN 1
-significant agitation and high CIWA scores overnight  -Psychiatry consulted - will start on lorazepam taper over the next few days  -if CIWA continues to be poorly controlled, would need MICU eval

## 2017-10-08 NOTE — PROVIDER CONTACT NOTE (OTHER) - ASSESSMENT
Patient settling down after Ativan 4 mg IV.Patient attempting to go to sleep.See vitals bp -140/100,heart rate 89/mt,Temp-97.7F

## 2017-10-08 NOTE — PROVIDER CONTACT NOTE (OTHER) - ASSESSMENT
Patient awake,agitated,restlesspacing the hallway.See the vitals.Patient wants  more aTIVAN 4 MG iv.

## 2017-10-08 NOTE — PROGRESS NOTE ADULT - ATTENDING COMMENTS
Patient seen and examined.  Agree with resident note.  Patient with extensive history of drug and alcohol abuse, alcohol withdrawal, as well as depression, suicide attempts.  Psych following.  Patient triggering CIWA, on high dose Ativan taper. It also appears that patient is falsely elevating CIWA scores.   Psych meds also restarted.  Continue 1:1.  Social Work consult

## 2017-10-09 DIAGNOSIS — F10.231 ALCOHOL DEPENDENCE WITH WITHDRAWAL DELIRIUM: ICD-10-CM

## 2017-10-09 LAB
ANION GAP SERPL CALC-SCNC: 11 MMOL/L — SIGNIFICANT CHANGE UP (ref 5–17)
ANION GAP SERPL CALC-SCNC: 12 MMOL/L — SIGNIFICANT CHANGE UP (ref 5–17)
BUN SERPL-MCNC: 3 MG/DL — LOW (ref 7–23)
BUN SERPL-MCNC: 4 MG/DL — LOW (ref 7–23)
CALCIUM SERPL-MCNC: 7.6 MG/DL — LOW (ref 8.4–10.5)
CALCIUM SERPL-MCNC: 9 MG/DL — SIGNIFICANT CHANGE UP (ref 8.4–10.5)
CHLORIDE SERPL-SCNC: 107 MMOL/L — SIGNIFICANT CHANGE UP (ref 96–108)
CHLORIDE SERPL-SCNC: 108 MMOL/L — SIGNIFICANT CHANGE UP (ref 96–108)
CHLORIDE UR-SCNC: 67 MMOL/L — SIGNIFICANT CHANGE UP
CO2 SERPL-SCNC: 20 MMOL/L — LOW (ref 22–31)
CO2 SERPL-SCNC: 20 MMOL/L — LOW (ref 22–31)
CREAT ?TM UR-MCNC: 6 MG/DL — SIGNIFICANT CHANGE UP
CREAT SERPL-MCNC: 0.53 MG/DL — SIGNIFICANT CHANGE UP (ref 0.5–1.3)
CREAT SERPL-MCNC: 0.66 MG/DL — SIGNIFICANT CHANGE UP (ref 0.5–1.3)
GLUCOSE SERPL-MCNC: 139 MG/DL — HIGH (ref 70–99)
GLUCOSE SERPL-MCNC: 86 MG/DL — SIGNIFICANT CHANGE UP (ref 70–99)
HCT VFR BLD CALC: 29.8 % — LOW (ref 34.5–45)
HGB BLD-MCNC: 9.5 G/DL — LOW (ref 11.5–15.5)
MAGNESIUM SERPL-MCNC: 1.7 MG/DL — SIGNIFICANT CHANGE UP (ref 1.6–2.6)
MCHC RBC-ENTMCNC: 27.7 PG — SIGNIFICANT CHANGE UP (ref 27–34)
MCHC RBC-ENTMCNC: 31.9 GM/DL — LOW (ref 32–36)
MCV RBC AUTO: 87 FL — SIGNIFICANT CHANGE UP (ref 80–100)
OSMOLALITY UR: 176 MOS/KG — LOW (ref 300–900)
PHOSPHATE SERPL-MCNC: 3.6 MG/DL — SIGNIFICANT CHANGE UP (ref 2.5–4.5)
PLATELET # BLD AUTO: 261 K/UL — SIGNIFICANT CHANGE UP (ref 150–400)
POTASSIUM SERPL-MCNC: 3.8 MMOL/L — SIGNIFICANT CHANGE UP (ref 3.5–5.3)
POTASSIUM SERPL-MCNC: 4.5 MMOL/L — SIGNIFICANT CHANGE UP (ref 3.5–5.3)
POTASSIUM SERPL-SCNC: 3.8 MMOL/L — SIGNIFICANT CHANGE UP (ref 3.5–5.3)
POTASSIUM SERPL-SCNC: 4.5 MMOL/L — SIGNIFICANT CHANGE UP (ref 3.5–5.3)
RBC # BLD: 3.42 M/UL — LOW (ref 3.8–5.2)
RBC # FLD: 19.5 % — HIGH (ref 10.3–14.5)
SODIUM SERPL-SCNC: 138 MMOL/L — SIGNIFICANT CHANGE UP (ref 135–145)
SODIUM SERPL-SCNC: 140 MMOL/L — SIGNIFICANT CHANGE UP (ref 135–145)
SODIUM UR-SCNC: 72 MMOL/L — SIGNIFICANT CHANGE UP
WBC # BLD: 8.9 K/UL — SIGNIFICANT CHANGE UP (ref 3.8–10.5)
WBC # FLD AUTO: 8.9 K/UL — SIGNIFICANT CHANGE UP (ref 3.8–10.5)

## 2017-10-09 PROCEDURE — 99291 CRITICAL CARE FIRST HOUR: CPT

## 2017-10-09 PROCEDURE — 99232 SBSQ HOSP IP/OBS MODERATE 35: CPT

## 2017-10-09 RX ORDER — SODIUM CHLORIDE 9 MG/ML
1000 INJECTION, SOLUTION INTRAVENOUS
Qty: 0 | Refills: 0 | Status: DISCONTINUED | OUTPATIENT
Start: 2017-10-09 | End: 2017-10-09

## 2017-10-09 RX ORDER — FLUMAZENIL 0.1 MG/ML
0.2 VIAL (ML) INTRAVENOUS ONCE
Qty: 0 | Refills: 0 | Status: DISCONTINUED | OUTPATIENT
Start: 2017-10-09 | End: 2017-10-09

## 2017-10-09 RX ORDER — SODIUM CHLORIDE 9 MG/ML
1000 INJECTION INTRAMUSCULAR; INTRAVENOUS; SUBCUTANEOUS ONCE
Qty: 0 | Refills: 0 | Status: COMPLETED | OUTPATIENT
Start: 2017-10-09 | End: 2017-10-09

## 2017-10-09 RX ORDER — MAGNESIUM SULFATE 500 MG/ML
2 VIAL (ML) INJECTION ONCE
Qty: 0 | Refills: 0 | Status: COMPLETED | OUTPATIENT
Start: 2017-10-09 | End: 2017-10-09

## 2017-10-09 RX ORDER — SODIUM CHLORIDE 9 MG/ML
1000 INJECTION INTRAMUSCULAR; INTRAVENOUS; SUBCUTANEOUS
Qty: 0 | Refills: 0 | Status: DISCONTINUED | OUTPATIENT
Start: 2017-10-09 | End: 2017-10-09

## 2017-10-09 RX ORDER — SODIUM CHLORIDE 9 MG/ML
1000 INJECTION INTRAMUSCULAR; INTRAVENOUS; SUBCUTANEOUS ONCE
Qty: 0 | Refills: 0 | Status: DISCONTINUED | OUTPATIENT
Start: 2017-10-09 | End: 2017-10-09

## 2017-10-09 RX ORDER — THIAMINE MONONITRATE (VIT B1) 100 MG
500 TABLET ORAL DAILY
Qty: 0 | Refills: 0 | Status: DISCONTINUED | OUTPATIENT
Start: 2017-10-09 | End: 2017-10-09

## 2017-10-09 RX ORDER — PHENOBARBITAL 60 MG
65 TABLET ORAL EVERY 6 HOURS
Qty: 0 | Refills: 0 | Status: DISCONTINUED | OUTPATIENT
Start: 2017-10-09 | End: 2017-10-09

## 2017-10-09 RX ORDER — FOLIC ACID 0.8 MG
1 TABLET ORAL DAILY
Qty: 0 | Refills: 0 | Status: DISCONTINUED | OUTPATIENT
Start: 2017-10-09 | End: 2017-10-09

## 2017-10-09 RX ORDER — DEXMEDETOMIDINE HYDROCHLORIDE IN 0.9% SODIUM CHLORIDE 4 UG/ML
0.02 INJECTION INTRAVENOUS
Qty: 200 | Refills: 0 | Status: DISCONTINUED | OUTPATIENT
Start: 2017-10-09 | End: 2017-10-11

## 2017-10-09 RX ORDER — POTASSIUM CHLORIDE 20 MEQ
10 PACKET (EA) ORAL
Qty: 0 | Refills: 0 | Status: COMPLETED | OUTPATIENT
Start: 2017-10-09 | End: 2017-10-09

## 2017-10-09 RX ORDER — ENOXAPARIN SODIUM 100 MG/ML
40 INJECTION SUBCUTANEOUS DAILY
Qty: 0 | Refills: 0 | Status: DISCONTINUED | OUTPATIENT
Start: 2017-10-09 | End: 2017-10-19

## 2017-10-09 RX ADMIN — Medication 1 PATCH: at 11:33

## 2017-10-09 RX ADMIN — SODIUM CHLORIDE 2000 MILLILITER(S): 9 INJECTION INTRAMUSCULAR; INTRAVENOUS; SUBCUTANEOUS at 03:13

## 2017-10-09 RX ADMIN — ENOXAPARIN SODIUM 40 MILLIGRAM(S): 100 INJECTION SUBCUTANEOUS at 11:33

## 2017-10-09 RX ADMIN — Medication 100 MILLIEQUIVALENT(S): at 13:52

## 2017-10-09 RX ADMIN — Medication 100 MILLIEQUIVALENT(S): at 11:27

## 2017-10-09 RX ADMIN — SODIUM CHLORIDE 125 MILLILITER(S): 9 INJECTION, SOLUTION INTRAVENOUS at 03:13

## 2017-10-09 RX ADMIN — DEXMEDETOMIDINE HYDROCHLORIDE IN 0.9% SODIUM CHLORIDE 0.24 MICROGRAM(S)/KG/HR: 4 INJECTION INTRAVENOUS at 07:30

## 2017-10-09 RX ADMIN — Medication 50 GRAM(S): at 08:46

## 2017-10-09 RX ADMIN — Medication 100 MILLIEQUIVALENT(S): at 10:02

## 2017-10-09 RX ADMIN — Medication 1 PATCH: at 11:34

## 2017-10-09 RX ADMIN — SODIUM CHLORIDE 2000 MILLILITER(S): 9 INJECTION INTRAMUSCULAR; INTRAVENOUS; SUBCUTANEOUS at 05:14

## 2017-10-09 RX ADMIN — Medication 65 MILLIGRAM(S): at 07:30

## 2017-10-09 NOTE — PROVIDER CONTACT NOTE (CHANGE IN STATUS NOTIFICATION) - BACKGROUND
Patient admitted with Alcohol intoxication,AMS.Last dose of Ativan 4 mg was at 2210.P atient received trazadone 100 mg at 2130

## 2017-10-09 NOTE — PROGRESS NOTE BEHAVIORAL HEALTH - SUMMARY
31 year old female with a past history of mdd, borderline personality disorder, etoh disorder presents after being found "down in a drug" house".  Etoh level on admission was 365 and was started on ciwa, librium taper.  Ciwa 9-17 and micu was consulted.  Currently patient appears comfortable, ?exaggerating sxs but reportedly does have a history of seizures and dts and is therefore high risk.  Is currently on effexor, topamax, neurontin, trazodone but none of them were prescribed on admission.  Patient is labile, reports depression, but not suicidal or homicidal, but does have history of impulsivity and self injurious behavior and is at risk of self harm if needs not met while in the hospital 31 year old female with a past history of mdd, borderline personality disorder, etoh disorder presents after being found "down in a drug" house".  Etoh level on admission was 365 and was started on ciwa, librium taper.  Ciwa 9-17 and micu was consulted. Pt was ?exaggerating sxs but reportedly does have a history of seizures and dts and is therefore high risk.  Is currently on effexor, topamax, neurontin, trazodone but none of them were prescribed on admission.  when seen by psych initially pt was labile, reported depression, but not suicidal or homicidal, but does have history of impulsivity and self injurious behavior and is at risk of self harm if needs not met while in the hospital. Pt is sedated today after getting Phenobarb

## 2017-10-09 NOTE — CHART NOTE - NSCHARTNOTEFT_GEN_A_CORE
RRT called for decreased mental status. 31 y/o F admitted with EtOH withdrawal. She is arousable to painful stimuli presently though very lethargic. Sedating medications were discontinued at previous rapid. ABG drawn and reviewed, no evidence of hypoxia or hypercarbia. She will be placed on continuous pulse oximetry in addition to telemetry. MICU called for re-consultation.

## 2017-10-09 NOTE — PROVIDER CONTACT NOTE (OTHER) - ASSESSMENT
Patient still deeply sedated ,MRASS of -4,See vitals SBP fluctuating between 80's to 90's.Patient completed 2 bolus of IVF NS of 1 liter each.

## 2017-10-09 NOTE — PROVIDER CONTACT NOTE (OTHER) - ACTION/TREATMENT ORDERED:
Patient arousable with very painful stimuli by the MICU staff but then goes back to deep sleep.3rd  literbolus of IVF NS as ordered and Multivitamin Bag initiated

## 2017-10-09 NOTE — PROVIDER CONTACT NOTE (OTHER) - ASSESSMENT
Patient continues to be MRASS of -4.Unable to arouse patient with painful  or tactile stimuli,sternal rub or to voice,See vitals.Patient is on Hourly CIWA assessment.Patient received trazadone 100 mg at 2130 and Ativan 4 mg IV at 2210.99% on room air.

## 2017-10-09 NOTE — PROGRESS NOTE BEHAVIORAL HEALTH - NSBHCONSULTMEDS_PSY_A_CORE FT
agree with holding all standing psych meds now  (pt was on Trazodone 200mg hs prn insomnia,  effexor xr 225mg daily, neurontin 400mg 4 daily,  topamax 200mg bid)   cont. with Precedex as per 1ary team   once pt is weaned off Precedex, may need to start Phenobarb taper vs. high dose Librium taper

## 2017-10-09 NOTE — PROVIDER CONTACT NOTE (CHANGE IN STATUS NOTIFICATION) - ASSESSMENT
During CIWA assessment  MRASS is -4 ,not arousable to painful stimuli,sternal rub , to voice ,to touch.Vitals BP-94/64,heart rate 67/mt.O 2 sat 97% at room air.Patient on constant O bservation and as per PCA at bedside,patient was awake 5  minutes before. During CIWA assessment  MRASS is -4 ,not arousable to painful stimuli,sternal rub , to voice ,to touch.Vitals BP-94/64,heart rate 67/mt.O 2 sat 97% at room air.Patient on constant O bservation and as per PCA at bedside,patient was awake 5  minutes before.Unable to perform CIWA assessment hourly.

## 2017-10-09 NOTE — PROVIDER CONTACT NOTE (CHANGE IN STATUS NOTIFICATION) - ACTION/TREATMENT ORDERED:
ABG drawn,1 liter bolus initiated,See RRT sheet,Patient still sedated ,woke up randomly and went back to deep sleep during the RRT
Ativan 4mg IV administered during the RRT
Patient arousable with very painful stimuli but  immediately goes back to sleep.See vitals and RRT sheet.

## 2017-10-09 NOTE — PROGRESS NOTE BEHAVIORAL HEALTH - NSBHFUPINTERVALHXFT_PSY_A_CORE
Pt seen, chart reviewed. Pt is sedated now, unable to assess full mental status. as per RN, pt was restless earlier today and kept asking for Ativan and was getting irritable. Pt was given Phenobarb IVP and fell asleep.

## 2017-10-09 NOTE — PROVIDER CONTACT NOTE (OTHER) - ASSESSMENT
Patient is alert,oriented x4.CIWA score increased from 5 to 7 due to increasing agitation.Patient is on standing dose of Ativan 4 mg q4h with PRN ativan 2 mg IV for a CIWA score of 8 or greater.Patient's anxiety level is increasing.

## 2017-10-09 NOTE — PROVIDER CONTACT NOTE (CHANGE IN STATUS NOTIFICATION) - ASSESSMENT
Patient continues to be MRASS of -4,unable to arouse patient with painful or tactile stimuli and sternal rub.SBP fluctuating between 80's to 90's,O 2 sat 99 to 100% at room air.3rd liter bolus of NS infusing Patient continues to be on CIWA assessment hourly.Unable to perform CIWA assessment as patient is sedated.

## 2017-10-09 NOTE — CHART NOTE - NSCHARTNOTEFT_GEN_A_CORE
Patient is a 31yo woman with PMHx of alcohol abuse d/o and per report multiple admissions for alcohol WD c/b by hallucinations and seizures, and ativan abuse who now presents with intoxication and Etoh withdrawal requiring high doses of ativan/phenobarbital/libirum.  ETOH level >300 on admission 10/6/17 even thought her "last drink was on 10/5/17." Pt is a 3 pints of vodka a day drinker, unknown amount of benzo use. Pt usually goes to [facility] but was brought in by friend after being found on the ground and unresponsive at e "drug house."    Pt accepted to MICU after 3 rapids called for unresponsiveness, with RASS -4, HoTN 70/30s with 3 L boluses (improved to 100s SBP, baseline), and bradycardia to 50s.    Pt had high CIWA scores and multiple rapids over last 2 days.  On 10/7/17- pt received:  22mg ativan  3 librium doses  260mg total phenobarbital    On 10/8 (over last 12 hours) pt received:  20 mg ativan  200mg topiramate  225mg effexor  800mg gabapentin  100 mg trazodone        PHYSICAL EXAM:  GENERAL: pt sleeping  HEAD: Atraumatic, Normocephalic  EYES: EOMI, PERRLA, conjunctiva and sclera clear, makeup smeared across eyes/mouth  NECK: Supple, No JVD  CHEST/LUNG: Clear to auscultation bilaterally; No wheezes/rales/rhonchi  HEART: bradycardic  ABDOMEN: Soft, Nontender, Nondistended; Bowel sounds present  EXTREMITIES:  2+ dP pulses b/l, No clubbing, cyanosis, or edema  PSYCH: sedated, groggy but responds to noxious stimuli  NEUROLOGY: AAOx3 upon arrival to MICU  SKIN: fingers and nails with blood caking      10-08    139  |  102  |  4<L>  ----------------------------<  84  3.6   |  21<L>  |  0.64    Ca    9.1      08 Oct 2017 22:58  Phos  3.8     10-08  Mg     1.8     10-08    TPro  7.0  /  Alb  4.2  /  TBili  0.3  /  DBili  x   /  AST  26  /  ALT  23  /  AlkPhos  46  10-08      LIVER FUNCTIONS - ( 08 Oct 2017 22:58 )  Alb: 4.2 g/dL / Pro: 7.0 g/dL / ALK PHOS: 46 U/L / ALT: 23 U/L RC / AST: 26 U/L / GGT: x             ABG - ( 09 Oct 2017 00:50 )  pH: 7.39  /  pCO2: 36    /  pO2: 112   / HCO3: 21    / Base Excess: -2.8  /  SaO2: 98            33 yo F with hx of alcohol and benzo dependance presents to MICU for RASS -4 after over sedation with ativan (20mg total in 12 hours), hypotension and bradycardia 2/2 benzos.    #Neuro  - at baseline  - a&o x3    #Resp  - saturating >92% on RA  - monitor on continuous pulse oximetry    #Cardiac  - bradycardic 2/2 benzo use, otherwise in NSR    #GI  - regular diet, if able     #Psych  - CIWA 16 at this time in MICU, c/w HIGH risk protocol  - banana bag to complete,m then LR @100cc/hr for maintenance  - DC all benzos, will start pt on precedex PRN agitated  - once pt's benzo effects wear of , will start on phenobarbital protocol, 65mg q6 hours standing  - DC all psych meds at this time  - will reassess medications once pt medically stable  - continue with 1:1 until psych clears her  - thiamine, MVI, folic acid daily  - repeat all labs in am Patient is a 33yo woman with PMHx of alcohol abuse d/o and per report multiple admissions for alcohol WD c/b by hallucinations and seizures, and ativan abuse who now presents with intoxication and Etoh withdrawal requiring high doses of ativan/phenobarbital/libirum.  ETOH level >300 on admission 10/6/17 even thought her "last drink was on 10/5/17." Pt is a 3 pints of vodka a day drinker, unknown amount of benzo use. Pt usually goes to [facility] but was brought in by friend after being found on the ground and unresponsive at e "drug house."    Pt accepted to MICU after 3 rapids called for unresponsiveness, with RASS -4, HoTN 70/30s with 3 L boluses (improved to 100s SBP, baseline), and bradycardia to 50s.    Pt had high CIWA scores and multiple rapids over last 2 days.  On 10/7/17- pt received:  22mg ativan  3 librium doses  260mg total phenobarbital    On 10/8 (over last 12 hours) pt received:  20 mg ativan  200mg topiramate  225mg effexor  800mg gabapentin  100 mg trazodone        PHYSICAL EXAM:  GENERAL: pt sleeping  HEAD: Atraumatic, Normocephalic  EYES: EOMI, PERRLA, conjunctiva and sclera clear, makeup smeared across eyes/mouth  NECK: Supple, No JVD  CHEST/LUNG: Clear to auscultation bilaterally; No wheezes/rales/rhonchi  HEART: bradycardic  ABDOMEN: Soft, Nontender, Nondistended; Bowel sounds present  EXTREMITIES:  2+ dP pulses b/l, No clubbing, cyanosis, or edema  PSYCH: sedated, groggy but responds to noxious stimuli  NEUROLOGY: AAOx3 upon arrival to MICU  SKIN: fingers and nails with blood caking      10-08    139  |  102  |  4<L>  ----------------------------<  84  3.6   |  21<L>  |  0.64    Ca    9.1      08 Oct 2017 22:58  Phos  3.8     10-  Mg     1.8     10-    TPro  7.0  /  Alb  4.2  /  TBili  0.3  /  DBili  x   /  AST  26  /  ALT  23  /  AlkPhos  46  10-      LIVER FUNCTIONS - ( 08 Oct 2017 22:58 )  Alb: 4.2 g/dL / Pro: 7.0 g/dL / ALK PHOS: 46 U/L / ALT: 23 U/L RC / AST: 26 U/L / GGT: x             ABG - ( 09 Oct 2017 00:50 )  pH: 7.39  /  pCO2: 36    /  pO2: 112   / HCO3: 21    / Base Excess: -2.8  /  SaO2: 98            33 yo F with hx of alcohol and benzo dependance presents to MICU for RASS -4 after over sedation with ativan (20mg total in 12 hours), hypotension and bradycardia 2/2 benzos.     #Neuro  - at baseline  - a&o x3    #Resp  - saturating >92% on RA  - monitor on continuous pulse oximetry    #Cardiac  - bradycardic 2/2 benzo use, otherwise in NSR    #GI  - regular diet, if able     #Psych  - CIWA 16 at this time in MICU, c/w HIGH risk protocol  - banana bag to complete,m then LR @100cc/hr for maintenance  - DC all benzos, will start pt on precedex PRN agitated  - once pt's benzo effects wear of , will start on phenobarbital protocol, 65mg q6 hours standing  - DC all psych meds at this time  - will reassess medications once pt medically stable  - continue with 1:1 until psych clears her  - thiamine, MVI, folic acid daily  - repeat all labs in am    CCM AttendinF first seen on 10/7 and known to the ICU was in multiple RRTs for unresponsiveness, RASS -4 associated with hypotension 70/30, hypothermia 96*F and bradycardia 50s resuscitated with 3 Li IVF bolus and transferred to MICU for Benzo cumulative OD (total 22 mg x 12 hr).  Patient admission ETOH level >365 10/6/17 and her last drink was on 10/5/17. She usually take 3 pints/day of Vodka and unknown amount of benzo. Pt had high CIWA scores 17-22 and multiple rapids over last 2 days.   - Continue MVI and BNN IV   - NPO, Airway watch, aspiration precaution and intubation prn   - IV Precedex gtt + Phenobarbital protocol   - Hold Benzodiazepine   - Hypothermic blanket      (Critical Care Time = 45 min) Patient is a 33yo woman with PMHx of alcohol abuse d/o and per report multiple admissions for alcohol WD c/b by hallucinations and seizures, and ativan abuse who now presents with intoxication and Etoh withdrawal requiring high doses of ativan/phenobarbital/libirum.  ETOH level >300 on admission 10/6/17 even thought her "last drink was on 10/5/17." Pt is a 3 pints of vodka a day drinker, unknown amount of benzo use. Pt usually goes to [facility] but was brought in by friend after being found on the ground and unresponsive at e "drug house."    Pt accepted to MICU after 3 rapids called for unresponsiveness, with RASS -4, HoTN 70/30s with 3 L boluses (improved to 100s SBP, baseline), and bradycardia to 50s.    Pt had high CIWA scores and multiple rapids over last 2 days.  On 10/7/17- pt received:  22mg ativan  3 librium doses  260mg total phenobarbital    On 10/8 (over last 12 hours) pt received:  20 mg ativan  200mg topiramate  225mg effexor  800mg gabapentin  100 mg trazodone        PHYSICAL EXAM:  GENERAL: pt sleeping  HEAD: Atraumatic, Normocephalic  EYES: EOMI, PERRLA, conjunctiva and sclera clear, makeup smeared across eyes/mouth  NECK: Supple, No JVD  CHEST/LUNG: Clear to auscultation bilaterally; No wheezes/rales/rhonchi  HEART: bradycardic  ABDOMEN: Soft, Nontender, Nondistended; Bowel sounds present  EXTREMITIES:  2+ dP pulses b/l, No clubbing, cyanosis, or edema  PSYCH: sedated, groggy but responds to noxious stimuli  NEUROLOGY: AAOx3 upon arrival to MICU  SKIN: fingers and nails with blood caking      10-09    140  |  108  |  4<L>  ----------------------------<  139<H>  3.8   |  20<L>  |  0.66    Ca    7.6<L>      09 Oct 2017 02:12  Phos  3.6     10-09  Mg     1.7     10-09    TPro  7.0  /  Alb  4.2  /  TBili  0.3  /  DBili  x   /  AST  26  /  ALT  23  /  AlkPhos  46  10                          9.5    8.9   )-----------( 261      ( 09 Oct 2017 02:12 )             29.8     LIVER FUNCTIONS - ( 08 Oct 2017 22:58 )  Alb: 4.2 g/dL / Pro: 7.0 g/dL / ALK PHOS: 46 U/L / ALT: 23 U/L RC / AST: 26 U/L / GGT: x             ABG - ( 09 Oct 2017 00:50 )  pH: 7.39  /  pCO2: 36    /  pO2: 112   / HCO3: 21    / Base Excess: -2.8  /  SaO2: 98          33 yo F with hx of alcohol and benzo dependance presents to MICU for RASS -4 after over sedation with ativan (20mg total in 12 hours), hypotension and bradycardia 2/2 benzos.     #Neuro  - at baseline  - a&o x3    #Resp  - saturating 100% on RA  - monitor on continuous pulse oximetry    #Cardiac  - bradycardic 2/2 benzo use, otherwise in NSR  - EKG showed sinus edmund, QTc 460    #GI  - regular diet    #  -has not urinated in >12 hours, bladder scan showed >600cc, stright cath completed  -if continues to retain will place danielson    #Psych  - CIWA 16 at this time in MICU, c/w HIGH risk protocol  - banana bag to complete,m then LR @100cc/hr for maintenance  - DC all benzos, will start pt on precedex PRN agitated  - once pt's benzo effects wear of , will start on phenobarbital protocol, 65mg q6 hours standing  - DC all psych meds at this time  - will reassess medications once pt medically stable  - continue with 1:1 until psych clears her  - thiamine, MVI, folic acid daily  - repeat all labs in am    CCM AttendinF first seen on 10/7 and known to the ICU was in multiple RRTs for unresponsiveness, RASS -4 associated with hypotension 70/30, hypothermia 96*F and bradycardia 50s resuscitated with 3 Li IVF bolus and transferred to MICU for Benzo cumulative OD (total 22 mg x 12 hr).  Patient admission ETOH level >365 10/6/17 and her last drink was on 10/5/17. She usually take 3 pints/day of Vodka and unknown amount of benzo. Pt had high CIWA scores 17-22 and multiple rapids over last 2 days.   - Continue MVI and BNN IV   - NPO, Airway watch, aspiration precaution and intubation prn   - IV Precedex gtt + Phenobarbital protocol   - Hold Benzodiazepine   - Hypothermic blanket      (Critical Care Time = 45 min)

## 2017-10-10 LAB
ALBUMIN SERPL ELPH-MCNC: 3.9 G/DL — SIGNIFICANT CHANGE UP (ref 3.3–5)
ALP SERPL-CCNC: 45 U/L — SIGNIFICANT CHANGE UP (ref 40–120)
ALT FLD-CCNC: 19 U/L RC — SIGNIFICANT CHANGE UP (ref 10–45)
ANION GAP SERPL CALC-SCNC: 15 MMOL/L — SIGNIFICANT CHANGE UP (ref 5–17)
APTT BLD: 29.1 SEC — SIGNIFICANT CHANGE UP (ref 27.5–37.4)
AST SERPL-CCNC: 20 U/L — SIGNIFICANT CHANGE UP (ref 10–40)
BASOPHILS # BLD AUTO: 0.1 K/UL — SIGNIFICANT CHANGE UP (ref 0–0.2)
BASOPHILS NFR BLD AUTO: 0.9 % — SIGNIFICANT CHANGE UP (ref 0–2)
BILIRUB SERPL-MCNC: 0.2 MG/DL — SIGNIFICANT CHANGE UP (ref 0.2–1.2)
BUN SERPL-MCNC: 5 MG/DL — LOW (ref 7–23)
CALCIUM SERPL-MCNC: 9.4 MG/DL — SIGNIFICANT CHANGE UP (ref 8.4–10.5)
CHLORIDE SERPL-SCNC: 103 MMOL/L — SIGNIFICANT CHANGE UP (ref 96–108)
CO2 SERPL-SCNC: 20 MMOL/L — LOW (ref 22–31)
CREAT SERPL-MCNC: 0.67 MG/DL — SIGNIFICANT CHANGE UP (ref 0.5–1.3)
EOSINOPHIL # BLD AUTO: 0.3 K/UL — SIGNIFICANT CHANGE UP (ref 0–0.5)
EOSINOPHIL NFR BLD AUTO: 3.8 % — SIGNIFICANT CHANGE UP (ref 0–6)
GLUCOSE SERPL-MCNC: 87 MG/DL — SIGNIFICANT CHANGE UP (ref 70–99)
HCT VFR BLD CALC: 34 % — LOW (ref 34.5–45)
HGB BLD-MCNC: 10.8 G/DL — LOW (ref 11.5–15.5)
INR BLD: 0.95 RATIO — SIGNIFICANT CHANGE UP (ref 0.88–1.16)
LYMPHOCYTES # BLD AUTO: 2.7 K/UL — SIGNIFICANT CHANGE UP (ref 1–3.3)
LYMPHOCYTES # BLD AUTO: 35.3 % — SIGNIFICANT CHANGE UP (ref 13–44)
MAGNESIUM SERPL-MCNC: 2.1 MG/DL — SIGNIFICANT CHANGE UP (ref 1.6–2.6)
MCHC RBC-ENTMCNC: 27.2 PG — SIGNIFICANT CHANGE UP (ref 27–34)
MCHC RBC-ENTMCNC: 31.7 GM/DL — LOW (ref 32–36)
MCV RBC AUTO: 85.9 FL — SIGNIFICANT CHANGE UP (ref 80–100)
MONOCYTES # BLD AUTO: 0.5 K/UL — SIGNIFICANT CHANGE UP (ref 0–0.9)
MONOCYTES NFR BLD AUTO: 7 % — SIGNIFICANT CHANGE UP (ref 2–14)
NEUTROPHILS # BLD AUTO: 4.1 K/UL — SIGNIFICANT CHANGE UP (ref 1.8–7.4)
NEUTROPHILS NFR BLD AUTO: 53.1 % — SIGNIFICANT CHANGE UP (ref 43–77)
PHOSPHATE SERPL-MCNC: 4.2 MG/DL — SIGNIFICANT CHANGE UP (ref 2.5–4.5)
PLATELET # BLD AUTO: 262 K/UL — SIGNIFICANT CHANGE UP (ref 150–400)
POTASSIUM SERPL-MCNC: 4.2 MMOL/L — SIGNIFICANT CHANGE UP (ref 3.5–5.3)
POTASSIUM SERPL-SCNC: 4.2 MMOL/L — SIGNIFICANT CHANGE UP (ref 3.5–5.3)
PROT SERPL-MCNC: 6.7 G/DL — SIGNIFICANT CHANGE UP (ref 6–8.3)
PROTHROM AB SERPL-ACNC: 10.3 SEC — SIGNIFICANT CHANGE UP (ref 9.8–12.7)
RBC # BLD: 3.96 M/UL — SIGNIFICANT CHANGE UP (ref 3.8–5.2)
RBC # FLD: 19.4 % — HIGH (ref 10.3–14.5)
SODIUM SERPL-SCNC: 138 MMOL/L — SIGNIFICANT CHANGE UP (ref 135–145)
WBC # BLD: 7.8 K/UL — SIGNIFICANT CHANGE UP (ref 3.8–10.5)
WBC # FLD AUTO: 7.8 K/UL — SIGNIFICANT CHANGE UP (ref 3.8–10.5)

## 2017-10-10 PROCEDURE — 99233 SBSQ HOSP IP/OBS HIGH 50: CPT

## 2017-10-10 PROCEDURE — 99233 SBSQ HOSP IP/OBS HIGH 50: CPT | Mod: GC

## 2017-10-10 RX ORDER — TOPIRAMATE 25 MG
50 TABLET ORAL
Qty: 0 | Refills: 0 | Status: DISCONTINUED | OUTPATIENT
Start: 2017-10-10 | End: 2017-10-15

## 2017-10-10 RX ORDER — PHENOBARBITAL 60 MG
32 TABLET ORAL ONCE
Qty: 0 | Refills: 0 | Status: DISCONTINUED | OUTPATIENT
Start: 2017-10-10 | End: 2017-10-10

## 2017-10-10 RX ORDER — THIAMINE MONONITRATE (VIT B1) 100 MG
100 TABLET ORAL DAILY
Qty: 0 | Refills: 0 | Status: DISCONTINUED | OUTPATIENT
Start: 2017-10-10 | End: 2017-10-19

## 2017-10-10 RX ORDER — PHENOBARBITAL 60 MG
65 TABLET ORAL ONCE
Qty: 0 | Refills: 0 | Status: DISCONTINUED | OUTPATIENT
Start: 2017-10-10 | End: 2017-10-10

## 2017-10-10 RX ORDER — VENLAFAXINE HCL 75 MG
75 CAPSULE, EXT RELEASE 24 HR ORAL DAILY
Qty: 0 | Refills: 0 | Status: DISCONTINUED | OUTPATIENT
Start: 2017-10-10 | End: 2017-10-12

## 2017-10-10 RX ORDER — FOLIC ACID 0.8 MG
1 TABLET ORAL DAILY
Qty: 0 | Refills: 0 | Status: DISCONTINUED | OUTPATIENT
Start: 2017-10-10 | End: 2017-10-19

## 2017-10-10 RX ORDER — PHENOBARBITAL 60 MG
65 TABLET ORAL
Qty: 0 | Refills: 0 | Status: DISCONTINUED | OUTPATIENT
Start: 2017-10-10 | End: 2017-10-11

## 2017-10-10 RX ORDER — VENLAFAXINE HCL 75 MG
75 CAPSULE, EXT RELEASE 24 HR ORAL DAILY
Qty: 0 | Refills: 0 | Status: DISCONTINUED | OUTPATIENT
Start: 2017-10-10 | End: 2017-10-10

## 2017-10-10 RX ORDER — VENLAFAXINE HCL 75 MG
225 CAPSULE, EXT RELEASE 24 HR ORAL DAILY
Qty: 0 | Refills: 0 | Status: DISCONTINUED | OUTPATIENT
Start: 2017-10-10 | End: 2017-10-10

## 2017-10-10 RX ADMIN — ENOXAPARIN SODIUM 40 MILLIGRAM(S): 100 INJECTION SUBCUTANEOUS at 11:01

## 2017-10-10 RX ADMIN — Medication 1 MILLIGRAM(S): at 11:00

## 2017-10-10 RX ADMIN — DEXMEDETOMIDINE HYDROCHLORIDE IN 0.9% SODIUM CHLORIDE 0.24 MICROGRAM(S)/KG/HR: 4 INJECTION INTRAVENOUS at 00:00

## 2017-10-10 RX ADMIN — Medication 1 TABLET(S): at 11:00

## 2017-10-10 RX ADMIN — Medication 65 MILLIGRAM(S): at 22:43

## 2017-10-10 RX ADMIN — Medication 65 MILLIGRAM(S): at 07:24

## 2017-10-10 RX ADMIN — Medication 65 MILLIGRAM(S): at 21:40

## 2017-10-10 RX ADMIN — Medication 50 MILLIGRAM(S): at 15:37

## 2017-10-10 RX ADMIN — Medication 65 MILLIGRAM(S): at 13:27

## 2017-10-10 RX ADMIN — Medication 32 MILLIGRAM(S): at 05:26

## 2017-10-10 RX ADMIN — Medication 65 MILLIGRAM(S): at 20:43

## 2017-10-10 RX ADMIN — Medication 65 MILLIGRAM(S): at 19:03

## 2017-10-10 RX ADMIN — Medication 100 MILLIGRAM(S): at 11:00

## 2017-10-10 RX ADMIN — Medication 65 MILLIGRAM(S): at 06:51

## 2017-10-10 RX ADMIN — Medication 65 MILLIGRAM(S): at 10:51

## 2017-10-10 RX ADMIN — Medication 65 MILLIGRAM(S): at 09:32

## 2017-10-10 RX ADMIN — DEXMEDETOMIDINE HYDROCHLORIDE IN 0.9% SODIUM CHLORIDE 0.24 MICROGRAM(S)/KG/HR: 4 INJECTION INTRAVENOUS at 11:01

## 2017-10-10 RX ADMIN — Medication 1 PATCH: at 11:00

## 2017-10-10 RX ADMIN — Medication 65 MILLIGRAM(S): at 17:51

## 2017-10-10 RX ADMIN — Medication 65 MILLIGRAM(S): at 19:44

## 2017-10-10 RX ADMIN — Medication 32 MILLIGRAM(S): at 04:31

## 2017-10-10 RX ADMIN — Medication 65 MILLIGRAM(S): at 22:08

## 2017-10-10 RX ADMIN — Medication 75 MILLIGRAM(S): at 13:19

## 2017-10-10 RX ADMIN — Medication 65 MILLIGRAM(S): at 15:37

## 2017-10-10 RX ADMIN — Medication 65 MILLIGRAM(S): at 17:02

## 2017-10-10 RX ADMIN — Medication 65 MILLIGRAM(S): at 14:28

## 2017-10-10 NOTE — PROGRESS NOTE BEHAVIORAL HEALTH - NSBHFUPINTERVALHXFT_PSY_A_CORE
Pt seen, chart reviewed. Pt found laying in bed, engages appropriately in interview. She admits to being homeless, staying with a male friend, drinking alcohol, developing tremors and calling 911 herself because she is aware of the symptoms associated with alcohol withdrawal. Pt is agreeable to follow up with in-pt rehab and has insight into her alcohol abuse problem. No SI/HI elicited. She remains med seeking per nursing.

## 2017-10-10 NOTE — PROGRESS NOTE BEHAVIORAL HEALTH - NSBHCONSULTPRIMARYDISCUSSNO_PSY_A_CORE FT
Plans for long term rehab discussed with MICU team and re-initiation of psychotropic meds
will speak to MICU team

## 2017-10-10 NOTE — PROGRESS NOTE BEHAVIORAL HEALTH - NSBHCONSULTMEDS_PSY_A_CORE FT
Topiramate 50mg PO BID and Effexor XR 75mg PO daily 1) Please initiate Topamax 50mg PO BID and Effexor XR 75mg PO daily, give first dose now.  2) Pt is agreeable to enter in-pt rehab and social work to coordinate this effort  3) Continue 1:1 for impulsivity and mood lability

## 2017-10-10 NOTE — PROGRESS NOTE BEHAVIORAL HEALTH - SUMMARY
32 y/o Female with PPHx of MDD without psychotic features, Borderline personality disorder, Alcohol use disorder, admitted after pt called 911 seeking help for withdrawal symptoms of alcohol, BAL on arrival 365, transferred to MICU for possible exaggeration of withdrawal symptoms. Pt is not suicidal or homicidal at this time, but does have a history of impulsivity and self injurious behavior and is at risk of self harm if needs not met while in the hospital.     [facility] confirmed pt last refilled Gabapentin 300mg PO TID, Trazodone 50mg PO daily, Topiramate 100mg PO Q12 and Effexor XR 150mg PO daily, last refilled 9/30/17. As pt is an unreliable historian, and compliance can not be confirmed, we will initiate low dose of the mood stabilizer and antidepressant and titrate to therapeutic dose. We recommend holding sedating medications such as Gabapentin and Trazodone at this time.    1) Please initiate Topamax 50mg PO BID and Effexor XR 75mg PO daily, give first dose now.  2) Pt is agreeable to enter in-pt rehab and social work to coordinate this effort 32 y/o Female with PPHx of MDD without psychotic features, Borderline personality disorder, Alcohol use disorder, admitted after pt called 911 seeking help for withdrawal symptoms of alcohol, BAL (365) transferred to MICU for possible withdrawal symptoms. Pt is not actively suicidal or homicidal, but does have a history of impulsivity and self injurious behavior and is at risk of self harm if needs not met while in the hospital.      [facility] confirmed pt last refilled Gabapentin 300mg PO TID, Trazodone 50mg PO daily, Topiramate 100mg PO Q12 and Effexor XR 150mg PO daily, last refilled 9/30/17. As pt is an unreliable historian, and compliance can not be confirmed, we will initiate low dose of the mood stabilizer and antidepressant and titrate to therapeutic dose. We recommend holding sedating medications such as Gabapentin and Trazodone at this time.    1) Please initiate Topamax 50mg PO BID and Effexor XR 75mg PO daily, give first dose now.  2) Pt is agreeable to enter in-pt rehab and social work to coordinate this effort  3) Continue 1:1 for impulsivity and mood lability 31 y/o Female with PPHx of MDD without psychotic features, Borderline personality disorder, Alcohol use disorder, admitted after pt called 911 seeking help for withdrawal symptoms of alcohol, BAL (365) transferred to MICU for possible withdrawal symptoms. Pt is not actively suicidal or homicidal, but does have a history of impulsivity and self injurious behavior and is at risk of self harm if needs not met while in the hospital.      [facility] confirmed pt last refilled Gabapentin 300mg PO TID, Trazodone 50mg PO daily, Topiramate 100mg PO Q12 and Effexor XR 150mg PO daily, last refilled 9/30/17. As pt is an unreliable historian, and compliance can not be confirmed, we will initiate low dose of the mood stabilizer and antidepressant and titrate to therapeutic dose. We recommend holding sedating medications such as Gabapentin and Trazodone at this time.

## 2017-10-10 NOTE — PROGRESS NOTE ADULT - ASSESSMENT
32F with hx of alcohol and benzo dependance admitted to MICU for RASS -4 after over sedation with ativan (20mg total in 12 hours), hypotension and bradycardia 2/2 benzos.     #Neuro  - mental status at baseline, pt is AO x 3    #Resp  - saturating 100% on RA, continue monitoring with continuous pulse ox     #Cardiac  - bradycardic 2/2 benzo use, HR remains stable in 60s  - EKG showed sinus edmund, QTc 460    #GI  - pt tolerating regular diet    #  - pt voiding without issues, renal function remains wnl   - daily BMP    #Psych  - CIWA scores as high as 27 yesterday, 8-18 overnight   - all benzos d/c, continue w/ phenobarbital as needed   - continue to hold all psych meds at this time  - constant observation   - thiamine, MVI, folic acid daily  - monitor and replete electrolytes as needed     #DVT ppx:   - Lovenox 32F with hx of alcohol and benzo dependance admitted to MICU for RASS -4 after over sedation with ativan (20mg total in 12 hours), hypotension and bradycardia 2/2 benzos.     #Neuro  - mental status at baseline, pt is AO x 3    #Resp  - saturating 100% on RA, continue monitoring with continuous pulse ox     #Cardiac  - bradycardic 2/2 benzo use, HR remains stable in 60s  - EKG showed sinus edmund, QTc 460    #GI  - pt tolerating regular diet    #  - pt voiding without issues, renal function remains wnl   - daily BMP    #Psych  - CIWA scores as high as 27 yesterday, 8-18 overnight   - all benzos d/c, continue w/ phenobarbital 65mg Q15min as needed   - will restart Effexor today and continue to hold other psych meds (  - constant observation   - thiamine, MVI, folic acid daily  - monitor and replete electrolytes as needed     #DVT ppx:   - Lovenox 32F with hx of alcohol and benzo dependance admitted to MICU for RASS -4 after over sedation with ativan (20mg total in 12 hours), hypotension and bradycardia 2/2 benzos.     #Neuro  - mental status at baseline, pt is AO x 3    #Resp  - saturating 100% on RA, continue monitoring with continuous pulse ox     #Cardiac  - bradycardic 2/2 benzo use, HR remains stable in 60s  - EKG showed sinus edmund, QTc 460    #GI  - pt tolerating regular diet    #  - pt voiding without issues, renal function remains wnl   - daily BMP    #Psych  - CIWA scores as high as 27 yesterday, 8-18 overnight   - all benzos d/c, continue w/ phenobarbital 65mg Q15min as needed   - will restart Effexor today and continue to hold other psych meds (  - constant observation   - thiamine, MVI, folic acid daily  - monitor and replete electrolytes as needed   - f/u psych recommendations     #DVT ppx:   - Lovenox

## 2017-10-10 NOTE — PROGRESS NOTE ADULT - SUBJECTIVE AND OBJECTIVE BOX
CHIEF COMPLAINT: etoh withdrawal     Interval Events:  Pt required phenobarbital 32mg and subsequently 65 mg overnight.     REVIEW OF SYSTEMS:  Constitutional:   Eyes:  ENT:  CV:  Resp:  GI:  :  MSK:  Integumentary:  Neurological:  Psychiatric:  Endocrine:  Hematologic/Lymphatic:  Allergic/Immunologic:  [ ] All other systems negative  [ ] Unable to assess ROS because ________    OBJECTIVE:  ICU Vital Signs Last 24 Hrs  T(C): 36.4 (10 Oct 2017 04:00), Max: 37.2 (09 Oct 2017 16:00)  T(F): 97.5 (10 Oct 2017 04:00), Max: 98.9 (09 Oct 2017 16:00)  HR: 69 (10 Oct 2017 06:00) (58 - 73)  BP: 88/58 (10 Oct 2017 06:00) (88/58 - 125/83)  BP(mean): 67 (10 Oct 2017 06:00) (67 - 100)  ABP: --  ABP(mean): --  RR: 17 (10 Oct 2017 06:00) (13 - 27)  SpO2: 100% (10 Oct 2017 06:00) (98% - 100%)        10-09 @ 07:01  -  10-10 @ 07:00  --------------------------------------------------------  IN: 3801.8 mL / OUT: 8770 mL / NET: -4968.2 mL      CAPILLARY BLOOD GLUCOSE    PHYSICAL EXAM:  General: NAD  HEENT:   Lymph Nodes:  Neck:   Respiratory:   Cardiovascular:   Abdomen:   Extremities:   Skin:   Neurological:  Psychiatry:    LINES:    HOSPITAL MEDICATIONS:  MEDICATIONS  (STANDING):  dexmedetomidine Infusion 0.02 MICROgram(s)/kG/Hr (0.241 mL/Hr) IV Continuous <Continuous>  enoxaparin Injectable 40 milliGRAM(s) SubCutaneous daily  influenza   Vaccine 0.5 milliLiter(s) IntraMuscular once  nicotine - 21 mG/24Hr(s) Patch 1 patch Transdermal daily    MEDICATIONS  (PRN):      LABS:                        10.8   7.8   )-----------( 262      ( 10 Oct 2017 02:32 )             34.0     10-10    138  |  103  |  5<L>  ----------------------------<  87  4.2   |  20<L>  |  0.67    Ca    9.4      10 Oct 2017 02:32  Phos  4.2     10-10  Mg     2.1     10-10    TPro  6.7  /  Alb  3.9  /  TBili  0.2  /  DBili  x   /  AST  20  /  ALT  19  /  AlkPhos  45  10-10    PT/INR - ( 10 Oct 2017 02:32 )   PT: 10.3 sec;   INR: 0.95 ratio         PTT - ( 10 Oct 2017 02:32 )  PTT:29.1 sec    Arterial Blood Gas:  10-09 @ 00:50  7.39/36/112/21/98/-2.8  ABG lactate: --  Arterial Blood Gas:  10-08 @ 22:56  7.49/30/364/22/100/.2  ABG lactate: --        MICROBIOLOGY:     RADIOLOGY:  [ ] Reviewed and interpreted by me    EKG: CHIEF COMPLAINT: etoh withdrawal     Interval Events:  Pt required phenobarbital 32mg x 2 and subsequently 65 mg overnight. CIWA scores range from 8 to 18 (mostly due to reported visual and auditory hallucinations). Pt is still requesting Ativan, reports feeling increasingly anxious overnight and endorses persistent auditory and visual hallucinations. She denies any fevers/chills, headaches,     REVIEW OF SYSTEMS:  Constitutional: no fever/chills   Eyes: no blurry vision or vision loss  ENT: no sinus symptoms or dysphagia   CV: no chest pain or shortness of breath   Resp: no cough or hemoptysis   GI: no abdominal pain or nausea/vomiting   : no dysuria   MSK: no joint pain   Integumentary: no rashes  Neurological: no headaches  Psychiatric: + anxiety, + depression, pt also endorses visual hallucinations    OBJECTIVE:  ICU Vital Signs Last 24 Hrs  T(C): 36.4 (10 Oct 2017 04:00), Max: 37.2 (09 Oct 2017 16:00)  T(F): 97.5 (10 Oct 2017 04:00), Max: 98.9 (09 Oct 2017 16:00)  HR: 69 (10 Oct 2017 06:00) (58 - 73)  BP: 88/58 (10 Oct 2017 06:00) (88/58 - 125/83)  BP(mean): 67 (10 Oct 2017 06:00) (67 - 100)  ABP: --  ABP(mean): --  RR: 17 (10 Oct 2017 06:00) (13 - 27)  SpO2: 100% (10 Oct 2017 06:00) (98% - 100%)        10-09 @ 07:01  -  10-10 @ 07:00  --------------------------------------------------------  IN: 3801.8 mL / OUT: 8770 mL / NET: -4968.2 mL      CAPILLARY BLOOD GLUCOSE    PHYSICAL EXAM:  General: NAD  HEENT:   Lymph Nodes:  Neck:   Respiratory:   Cardiovascular:   Abdomen:   Extremities:   Skin:   Neurological:  Psychiatry:    LINES:    HOSPITAL MEDICATIONS:  MEDICATIONS  (STANDING):  dexmedetomidine Infusion 0.02 MICROgram(s)/kG/Hr (0.241 mL/Hr) IV Continuous <Continuous>  enoxaparin Injectable 40 milliGRAM(s) SubCutaneous daily  influenza   Vaccine 0.5 milliLiter(s) IntraMuscular once  nicotine - 21 mG/24Hr(s) Patch 1 patch Transdermal daily    MEDICATIONS  (PRN):      LABS:                        10.8   7.8   )-----------( 262      ( 10 Oct 2017 02:32 )             34.0     10-10    138  |  103  |  5<L>  ----------------------------<  87  4.2   |  20<L>  |  0.67    Ca    9.4      10 Oct 2017 02:32  Phos  4.2     10-10  Mg     2.1     10-10    TPro  6.7  /  Alb  3.9  /  TBili  0.2  /  DBili  x   /  AST  20  /  ALT  19  /  AlkPhos  45  10-10    PT/INR - ( 10 Oct 2017 02:32 )   PT: 10.3 sec;   INR: 0.95 ratio         PTT - ( 10 Oct 2017 02:32 )  PTT:29.1 sec    Arterial Blood Gas:  10-09 @ 00:50  7.39/36/112/21/98/-2.8  ABG lactate: --  Arterial Blood Gas:  10-08 @ 22:56  7.49/30/364/22/100/.2  ABG lactate: --        MICROBIOLOGY:     RADIOLOGY:  [ ] Reviewed and interpreted by me    EKG: CHIEF COMPLAINT: etoh withdrawal     Interval Events:  Pt required phenobarbital 32mg x 2 and subsequently 65 mg overnight. CIWA scores range from 8 to 18 (mostly due to reported visual and auditory hallucinations). Pt is still requesting Ativan, reports feeling increasingly anxious overnight and endorses persistent auditory and visual hallucinations. She denies any fevers/chills, headaches, chest pain, abdominal pain and nausea/vomiting.     REVIEW OF SYSTEMS:  Constitutional: no fever/chills   Eyes: no blurry vision or vision loss  ENT: no sinus symptoms or dysphagia   CV: no chest pain or shortness of breath   Resp: no cough or hemoptysis   GI: no abdominal pain or nausea/vomiting   : no dysuria   MSK: no joint pain   Integumentary: no rashes  Neurological: no headaches  Psychiatric: + anxiety, + depression, pt also endorses visual hallucinations    OBJECTIVE:  ICU Vital Signs Last 24 Hrs  T(C): 36.4 (10 Oct 2017 04:00), Max: 37.2 (09 Oct 2017 16:00)  T(F): 97.5 (10 Oct 2017 04:00), Max: 98.9 (09 Oct 2017 16:00)  HR: 69 (10 Oct 2017 06:00) (58 - 73)  BP: 88/58 (10 Oct 2017 06:00) (88/58 - 125/83)  BP(mean): 67 (10 Oct 2017 06:00) (67 - 100)  ABP: --  ABP(mean): --  RR: 17 (10 Oct 2017 06:00) (13 - 27)  SpO2: 100% (10 Oct 2017 06:00) (98% - 100%)        10-09 @ 07:01  -  10-10 @ 07:00  --------------------------------------------------------  IN: 3801.8 mL / OUT: 8770 mL / NET: -4968.2 mL      CAPILLARY BLOOD GLUCOSE    PHYSICAL EXAM:  General: NAD  HEENT: EOMI, GWEN   Lymph Nodes: no axillary or cervical LAD  Neck: normal, supple   Respiratory: CTA b/l   Cardiovascular: S1, S2 RRR  Abdomen: soft, nontender, nondistended  Extremities: no lower extremity edema   Skin: intact, no rashes   Neurological: AO x 3   Psychiatry: anxious     LINES:    HOSPITAL MEDICATIONS:  MEDICATIONS  (STANDING):  dexmedetomidine Infusion 0.02 MICROgram(s)/kG/Hr (0.241 mL/Hr) IV Continuous <Continuous>  enoxaparin Injectable 40 milliGRAM(s) SubCutaneous daily  influenza   Vaccine 0.5 milliLiter(s) IntraMuscular once  nicotine - 21 mG/24Hr(s) Patch 1 patch Transdermal daily    MEDICATIONS  (PRN):      LABS:                        10.8   7.8   )-----------( 262      ( 10 Oct 2017 02:32 )             34.0     10-10    138  |  103  |  5<L>  ----------------------------<  87  4.2   |  20<L>  |  0.67    Ca    9.4      10 Oct 2017 02:32  Phos  4.2     10-10  Mg     2.1     10-10    TPro  6.7  /  Alb  3.9  /  TBili  0.2  /  DBili  x   /  AST  20  /  ALT  19  /  AlkPhos  45  10-10    PT/INR - ( 10 Oct 2017 02:32 )   PT: 10.3 sec;   INR: 0.95 ratio         PTT - ( 10 Oct 2017 02:32 )  PTT:29.1 sec    Arterial Blood Gas:  10-09 @ 00:50  7.39/36/112/21/98/-2.8  ABG lactate: --  Arterial Blood Gas:  10-08 @ 22:56  7.49/30/364/22/100/.2  ABG lactate: --        MICROBIOLOGY:     RADIOLOGY:  [ ] Reviewed and interpreted by me    EKG:

## 2017-10-11 LAB
ALBUMIN SERPL ELPH-MCNC: 4.2 G/DL — SIGNIFICANT CHANGE UP (ref 3.3–5)
ALP SERPL-CCNC: 45 U/L — SIGNIFICANT CHANGE UP (ref 40–120)
ALT FLD-CCNC: 20 U/L RC — SIGNIFICANT CHANGE UP (ref 10–45)
ANION GAP SERPL CALC-SCNC: 13 MMOL/L — SIGNIFICANT CHANGE UP (ref 5–17)
AST SERPL-CCNC: 34 U/L — SIGNIFICANT CHANGE UP (ref 10–40)
BASOPHILS # BLD AUTO: 0.1 K/UL — SIGNIFICANT CHANGE UP (ref 0–0.2)
BASOPHILS NFR BLD AUTO: 1.1 % — SIGNIFICANT CHANGE UP (ref 0–2)
BILIRUB SERPL-MCNC: 0.1 MG/DL — LOW (ref 0.2–1.2)
BUN SERPL-MCNC: 9 MG/DL — SIGNIFICANT CHANGE UP (ref 7–23)
CALCIUM SERPL-MCNC: 9 MG/DL — SIGNIFICANT CHANGE UP (ref 8.4–10.5)
CHLORIDE SERPL-SCNC: 101 MMOL/L — SIGNIFICANT CHANGE UP (ref 96–108)
CO2 SERPL-SCNC: 22 MMOL/L — SIGNIFICANT CHANGE UP (ref 22–31)
CREAT SERPL-MCNC: 0.82 MG/DL — SIGNIFICANT CHANGE UP (ref 0.5–1.3)
EOSINOPHIL # BLD AUTO: 0.4 K/UL — SIGNIFICANT CHANGE UP (ref 0–0.5)
EOSINOPHIL NFR BLD AUTO: 4.5 % — SIGNIFICANT CHANGE UP (ref 0–6)
GLUCOSE SERPL-MCNC: 81 MG/DL — SIGNIFICANT CHANGE UP (ref 70–99)
HCT VFR BLD CALC: 35.3 % — SIGNIFICANT CHANGE UP (ref 34.5–45)
HGB BLD-MCNC: 11.2 G/DL — LOW (ref 11.5–15.5)
LYMPHOCYTES # BLD AUTO: 2.4 K/UL — SIGNIFICANT CHANGE UP (ref 1–3.3)
LYMPHOCYTES # BLD AUTO: 31.5 % — SIGNIFICANT CHANGE UP (ref 13–44)
MAGNESIUM SERPL-MCNC: 2 MG/DL — SIGNIFICANT CHANGE UP (ref 1.6–2.6)
MCHC RBC-ENTMCNC: 27.3 PG — SIGNIFICANT CHANGE UP (ref 27–34)
MCHC RBC-ENTMCNC: 31.9 GM/DL — LOW (ref 32–36)
MCV RBC AUTO: 85.7 FL — SIGNIFICANT CHANGE UP (ref 80–100)
MONOCYTES # BLD AUTO: 0.6 K/UL — SIGNIFICANT CHANGE UP (ref 0–0.9)
MONOCYTES NFR BLD AUTO: 7.6 % — SIGNIFICANT CHANGE UP (ref 2–14)
NEUTROPHILS # BLD AUTO: 4.3 K/UL — SIGNIFICANT CHANGE UP (ref 1.8–7.4)
NEUTROPHILS NFR BLD AUTO: 55.3 % — SIGNIFICANT CHANGE UP (ref 43–77)
PHENOBARB SERPL-MCNC: 48.2 UG/ML — HIGH (ref 15–40)
PHENOBARB SERPL-MCNC: 49.3 UG/ML — HIGH (ref 15–40)
PHOSPHATE SERPL-MCNC: 5.2 MG/DL — HIGH (ref 2.5–4.5)
PLATELET # BLD AUTO: 282 K/UL — SIGNIFICANT CHANGE UP (ref 150–400)
POTASSIUM SERPL-MCNC: 4.9 MMOL/L — SIGNIFICANT CHANGE UP (ref 3.5–5.3)
POTASSIUM SERPL-SCNC: 4.9 MMOL/L — SIGNIFICANT CHANGE UP (ref 3.5–5.3)
PROT SERPL-MCNC: 7.2 G/DL — SIGNIFICANT CHANGE UP (ref 6–8.3)
RBC # BLD: 4.11 M/UL — SIGNIFICANT CHANGE UP (ref 3.8–5.2)
RBC # FLD: 19.9 % — HIGH (ref 10.3–14.5)
SODIUM SERPL-SCNC: 136 MMOL/L — SIGNIFICANT CHANGE UP (ref 135–145)
WBC # BLD: 7.8 K/UL — SIGNIFICANT CHANGE UP (ref 3.8–10.5)
WBC # FLD AUTO: 7.8 K/UL — SIGNIFICANT CHANGE UP (ref 3.8–10.5)

## 2017-10-11 PROCEDURE — 93010 ELECTROCARDIOGRAM REPORT: CPT

## 2017-10-11 RX ORDER — HALOPERIDOL DECANOATE 100 MG/ML
2.5 INJECTION INTRAMUSCULAR ONCE
Qty: 0 | Refills: 0 | Status: COMPLETED | OUTPATIENT
Start: 2017-10-11 | End: 2017-10-11

## 2017-10-11 RX ORDER — PHENOBARBITAL 60 MG
65 TABLET ORAL
Qty: 0 | Refills: 0 | Status: DISCONTINUED | OUTPATIENT
Start: 2017-10-11 | End: 2017-10-12

## 2017-10-11 RX ADMIN — Medication 65 MILLIGRAM(S): at 14:23

## 2017-10-11 RX ADMIN — Medication 1 PATCH: at 11:25

## 2017-10-11 RX ADMIN — Medication 50 MILLIGRAM(S): at 18:10

## 2017-10-11 RX ADMIN — Medication 65 MILLIGRAM(S): at 07:34

## 2017-10-11 RX ADMIN — HALOPERIDOL DECANOATE 2.5 MILLIGRAM(S): 100 INJECTION INTRAMUSCULAR at 20:21

## 2017-10-11 RX ADMIN — Medication 1 TABLET(S): at 11:25

## 2017-10-11 RX ADMIN — Medication 100 MILLIGRAM(S): at 11:25

## 2017-10-11 RX ADMIN — Medication 1 PATCH: at 11:27

## 2017-10-11 RX ADMIN — HALOPERIDOL DECANOATE 2.5 MILLIGRAM(S): 100 INJECTION INTRAMUSCULAR at 19:05

## 2017-10-11 RX ADMIN — Medication 50 MILLIGRAM(S): at 05:07

## 2017-10-11 RX ADMIN — HALOPERIDOL DECANOATE 2.5 MILLIGRAM(S): 100 INJECTION INTRAMUSCULAR at 16:32

## 2017-10-11 RX ADMIN — Medication 65 MILLIGRAM(S): at 05:07

## 2017-10-11 RX ADMIN — Medication 1 MILLIGRAM(S): at 11:25

## 2017-10-11 RX ADMIN — Medication 65 MILLIGRAM(S): at 04:39

## 2017-10-11 RX ADMIN — Medication 65 MILLIGRAM(S): at 13:09

## 2017-10-11 RX ADMIN — Medication 65 MILLIGRAM(S): at 00:32

## 2017-10-11 RX ADMIN — Medication 65 MILLIGRAM(S): at 00:06

## 2017-10-11 RX ADMIN — Medication 75 MILLIGRAM(S): at 11:25

## 2017-10-11 RX ADMIN — ENOXAPARIN SODIUM 40 MILLIGRAM(S): 100 INJECTION SUBCUTANEOUS at 11:25

## 2017-10-11 NOTE — PROGRESS NOTE ADULT - ASSESSMENT
32F with hx of alcohol and benzo dependance admitted to MICU for RASS -4 after over sedation with ativan (20mg total in 12 hours), hypotension and bradycardia 2/2 benzos.     #Neuro  - mental status at baseline, pt is AO x 3    #Resp  - saturating 100% on RA, continue monitoring with continuous pulse ox     #Cardiac  - bradycardic 2/2 benzo use, EKG showed sinus edmund, QTc 460 yesterday  -CTM    #GI  - pt tolerating regular diet    #  - pt voiding without issues, renal function remains wnl   - daily BMP    #Psych  - CIWA scores as high as 27 yesterday, 8-18 overnight, now off CIWA  - all benzos d/c, continue w/ phenobarbital 65mg Q15min as needed   - c/w Effexor and Topamax, continue to hold other psych meds (  - constant observation   - thiamine, MVI, folic acid daily  - monitor and replete electrolytes as needed   - f/u psych recommendations     #DVT ppx:   - Lovenox    Karen Aguirre, PGY3   32F with hx of alcohol and benzo dependance admitted to MICU for RASS -4 after over sedation with ativan (20mg total in 12 hours), hypotension and bradycardia 2/2 benzos.     #Neuro  - mental status at baseline, pt is AO x 3    #Resp  - saturating 100% on RA, continue monitoring with continuous pulse ox     #Cardiac  - bradycardic 2/2 benzo use, HR remains stable in 60s  - EKG showed sinus edmund, QTc 460    #GI  - pt tolerating regular diet    #  - pt voiding without issues, renal function remains wnl   - daily BMP    #Psych  - CIWA scores as high as 27 yesterday, 8-18 overnight   - all benzos d/c, continue w/ phenobarbital 65mg Q15min as needed   - will restart Effexor today and continue to hold other psych meds (  - constant observation   - thiamine, MVI, folic acid daily  - monitor and replete electrolytes as needed     #DVT ppx:   - Lovenox

## 2017-10-11 NOTE — CHART NOTE - NSCHARTNOTEFT_GEN_A_CORE
Follow up.    32F with hx of alcohol and benzo dependance admitted to MICU for RASS -4 after over sedation with ativan (20mg total in 12 hours), hypotension and bradycardia 2/2 benzos.    Source: Patient [x ]    Family [ ]     other [x ] chart    Diet : Regular      Patient reports [ ] nausea  [ ] vomiting [ ] diarrhea [ ] constipation  [ ]chewing problems [ ] swallowing issues  [ ] other: last BM 10/6     PO intake:  < 50% [ ] 50-75% [ ]   % [x ]  other : pt reports eating well     Source for PO intake [x ] Patient [ ] family [ ] chart [ ] staff [ ] other     Enteral /Parenteral Nutrition: n/a      Current Weight: Dosing Weight (kg): 48.1, 105.8lb  (10-06),  10/11 107.1lb  no edema    Pertinent Medications: MEDICATIONS  (STANDING):  enoxaparin Injectable 40 milliGRAM(s) SubCutaneous daily  folic acid 1 milliGRAM(s) Oral daily  multivitamin 1 Tablet(s) Oral daily  nicotine - 21 mG/24Hr(s) Patch 1 patch Transdermal daily  thiamine 100 milliGRAM(s) Oral daily  topiramate 50 milliGRAM(s) Oral two times a day  venlafaxine XR. 75 milliGRAM(s) Oral daily    MEDICATIONS  (PRN):    Pertinent Labs:  10-11 Na136 mmol/L Glu 81 mg/dL K+ 4.9 mmol/L Cr  0.82 mg/dL BUN 9 mg/dL Phos 5.2 mg/dL<H> Alb 4.2 g/dL PAB n/a         Skin: no pressure injuries    Estimated Needs:   [ ] no change since previous assessment  [ ] recalculated:       Previous Nutrition Diagnosis:     [ ] Inadequate Energy Intake [ ]Inadequate Oral Intake [ ] Excessive Energy Intake     [ ] Underweight [ ] Increased Nutrient Needs [ ] Overweight/Obesity     [ ] Altered GI Function [ ] Unintended Weight Loss [ ] Food & Nutrition Related Knowledge Deficit [ ] Malnutrition          Nutrition Diagnosis is [ ] ongoing  [ ] resolved [ ] not applicable          New Nutrition Diagnosis: [ ] not applicable    [ ] Inadequate Protein Energy Intake [ ]Inadequate Oral Intake [ ] Excessive Energy Intake     [ ] Underweight [ ] Increased Nutrient Needs [ ] Overweight/Obesity     [ ] Altered GI Function [ ] Unintended Weight Loss [ ] Food & Nutrition Related Knowledge Deficit[ ] Limited Adherence to nutrition related recommendations [ ] Malnutrition  [ ] other: Free text       Related to:      As evidenced by:      Interventions:     Recommend    [ ] Change Diet To:    [ ] Nutrition Supplement    [ ] Nutrition Support    [ ] Other:        Monitoring and Evaluation:     [ ] PO intake [ ] Tolerance to diet prescription [ ] weights [ ] follow up per protocol    [ ] other: Follow up.    32F with hx of alcohol and benzo dependance admitted to MICU for RASS -4 after over sedation with ativan (20mg total in 12 hours), hypotension and bradycardia 2/2 benzos.    Source: Patient [x ]    Family [ ]     other [x ] chart    Diet : Regular      Patient reports [ ] nausea  [ ] vomiting [ ] diarrhea [ ] constipation  [ ]chewing problems [ ] swallowing issues  [ ] other: last BM 10/6     PO intake:  < 50% [ ] 50-75% [ ]   % [x ]  other : pt reports eating well     Source for PO intake [x ] Patient [ ] family [ ] chart [ ] staff [ ] other     Enteral /Parenteral Nutrition: n/a      Current Weight: Dosing Weight (kg): 48.1, 105.8lb  (10-06),  10/11 107.1lb  no edema    Pertinent Medications: MEDICATIONS  (STANDING):  enoxaparin Injectable 40 milliGRAM(s) SubCutaneous daily  folic acid 1 milliGRAM(s) Oral daily  multivitamin 1 Tablet(s) Oral daily  nicotine - 21 mG/24Hr(s) Patch 1 patch Transdermal daily  thiamine 100 milliGRAM(s) Oral daily  topiramate 50 milliGRAM(s) Oral two times a day  venlafaxine XR. 75 milliGRAM(s) Oral daily    MEDICATIONS  (PRN):    Pertinent Labs:  10-11 Na136 mmol/L Glu 81 mg/dL K+ 4.9 mmol/L Cr  0.82 mg/dL BUN 9 mg/dL Phos 5.2 mg/dL<H> Alb 4.2 g/dL PAB n/a         Skin: no pressure injuries    Estimated Needs:   [ ] no change since previous assessment  [ ] recalculated:       New Nutrition Diagnosis: [ ] not applicable     [x ] Increased Nutrient Needs, micronutrients       Related to: potential for deficiency/increased demand     As evidenced by: ETOH abuse     Interventions:     Recommend    [ ] Change Diet To:    [ ] Nutrition Supplement    [ ] Nutrition Support    [x ] Other: continue regular diet, thiamine, folic acid and mvi       Monitoring and Evaluation:     [x ] PO intake [ x] Tolerance to diet prescription [x ] weights [x ] follow up per protocol    [ ] other:

## 2017-10-11 NOTE — PROGRESS NOTE ADULT - SUBJECTIVE AND OBJECTIVE BOX
CHIEF COMPLAINT:    Interval Events: Overnight pt received phenobarb almost every hour for 'withdrawal symptoms'. This morning, pt complained of shakiness, HA, pin and needles in arms and legs. Off precedex since 2pm yesterday. Still complained of auditory and visual hallucinations.     REVIEW OF SYSTEMS:  Constitutional: [X ] negative [ ] fevers [ ] chills [ ] weight loss [ ] weight gain  HEENT: [ X] negative [ ] dry eyes [ ] eye irritation [ ] postnasal drip [ ] nasal congestion  CV: [ X] negative  [ ] chest pain [ ] orthopnea [ ] palpitations [ ] murmur  Resp: [X ] negative [ ] cough [ ] shortness of breath [ ] dyspnea [ ] wheezing [ ] sputum [ ] hemoptysis  GI: [X ] negative [ ] nausea [ ] vomiting [ ] diarrhea [ ] constipation [ ] abd pain [ ] dysphagia   : [ ] negative [ ] dysuria [ ] nocturia [ ] hematuria [ ] increased urinary frequency  Musculoskeletal: [ ] negative [ ] back pain [ ] myalgias [ ] arthralgias [ ] fracture + pins and needles on arms and legs  Skin: [X ] negative [ ] rash [ ] itch  Neurological: [ ] negative [ ] headache [ ] dizziness [ ] syncope [ ] weakness [ ] numbness + tremor  Psychiatric: [ ] negative [X ] anxiety [ ] depression  Endocrine: [ X] negative [ ] diabetes [ ] thyroid problem  Hematologic/Lymphatic: [ X] negative [ ] anemia [ ] bleeding problem  Allergic/Immunologic: [X ] negative [ ] itchy eyes [ ] nasal discharge [ ] hives [ ] angioedema  [ ] All other systems negative  [ ] Unable to assess ROS because ________    OBJECTIVE:  ICU Vital Signs Last 24 Hrs  T(C): 36.9 (11 Oct 2017 04:00), Max: 37.3 (10 Oct 2017 08:00)  T(F): 98.5 (11 Oct 2017 04:00), Max: 99.1 (10 Oct 2017 08:00)  HR: 74 (11 Oct 2017 07:00) (59 - 113)  BP: 81/50 (11 Oct 2017 07:00) (81/48 - 113/75)  BP(mean): 61 (11 Oct 2017 07:00) (60 - 89)  ABP: --  ABP(mean): --  RR: 16 (11 Oct 2017 07:00) (10 - 29)  SpO2: 100% (11 Oct 2017 07:00) (96% - 100%)        10-10 @ 07:01  -  10-11 @ 07:00  --------------------------------------------------------  IN: 2852.4 mL / OUT: 4100 mL / NET: -1247.6 mL      CAPILLARY BLOOD GLUCOSE        PHYSICAL EXAM:  General: NAD  HEENT: EOMI, GWEN   Lymph Nodes: no axillary or cervical LAD  Neck: normal, supple   Respiratory: CTA b/l   Cardiovascular: S1, S2 RRR  Abdomen: soft, nontender, nondistended  Extremities: no lower extremity edema   Skin: intact, no rashes   Neurological: AO x 3   Psychiatry: anxious     LINES: peripherals    HOSPITAL MEDICATIONS:  Standing Meds:  dexmedetomidine Infusion 0.02 MICROgram(s)/kG/Hr IV Continuous <Continuous>  enoxaparin Injectable 40 milliGRAM(s) SubCutaneous daily  folic acid 1 milliGRAM(s) Oral daily  multivitamin 1 Tablet(s) Oral daily  nicotine - 21 mG/24Hr(s) Patch 1 patch Transdermal daily  thiamine 100 milliGRAM(s) Oral daily  topiramate 50 milliGRAM(s) Oral two times a day  venlafaxine XR. 75 milliGRAM(s) Oral daily      PRN Meds:  PHENobarbital Injectable 65 milliGRAM(s) IV Push every 15 minutes PRN      LABS:                        11.2   7.8   )-----------( 282      ( 11 Oct 2017 04:46 )             35.3     Hgb Trend: 11.2<--, 10.8<--, 9.5<--, 11.2<--  10-11    136  |  101  |  9   ----------------------------<  81  4.9   |  22  |  0.82    Ca    9.0      11 Oct 2017 04:46  Phos  5.2     10-11  Mg     2.0     10-11    TPro  7.2  /  Alb  4.2  /  TBili  0.1<L>  /  DBili  x   /  AST  34  /  ALT  20  /  AlkPhos  45  10-11    Creatinine Trend: 0.82<--, 0.67<--, 0.53<--, 0.66<--, 0.64<--, 0.63<--  PT/INR - ( 10 Oct 2017 02:32 )   PT: 10.3 sec;   INR: 0.95 ratio         PTT - ( 10 Oct 2017 02:32 )  PTT:29.1 sec      MICROBIOLOGY:     RADIOLOGY:  [X ] Reviewed and interpreted by me    EKG:

## 2017-10-12 LAB
ANION GAP SERPL CALC-SCNC: 14 MMOL/L — SIGNIFICANT CHANGE UP (ref 5–17)
BASOPHILS # BLD AUTO: 0.1 K/UL — SIGNIFICANT CHANGE UP (ref 0–0.2)
BASOPHILS NFR BLD AUTO: 1 % — SIGNIFICANT CHANGE UP (ref 0–2)
BUN SERPL-MCNC: 11 MG/DL — SIGNIFICANT CHANGE UP (ref 7–23)
CALCIUM SERPL-MCNC: 9 MG/DL — SIGNIFICANT CHANGE UP (ref 8.4–10.5)
CHLORIDE SERPL-SCNC: 101 MMOL/L — SIGNIFICANT CHANGE UP (ref 96–108)
CO2 SERPL-SCNC: 22 MMOL/L — SIGNIFICANT CHANGE UP (ref 22–31)
CREAT SERPL-MCNC: 0.68 MG/DL — SIGNIFICANT CHANGE UP (ref 0.5–1.3)
EOSINOPHIL # BLD AUTO: 0.3 K/UL — SIGNIFICANT CHANGE UP (ref 0–0.5)
EOSINOPHIL NFR BLD AUTO: 4.6 % — SIGNIFICANT CHANGE UP (ref 0–6)
GLUCOSE SERPL-MCNC: 83 MG/DL — SIGNIFICANT CHANGE UP (ref 70–99)
HCT VFR BLD CALC: 33.7 % — LOW (ref 34.5–45)
HGB BLD-MCNC: 10.9 G/DL — LOW (ref 11.5–15.5)
LYMPHOCYTES # BLD AUTO: 2.5 K/UL — SIGNIFICANT CHANGE UP (ref 1–3.3)
LYMPHOCYTES # BLD AUTO: 34.4 % — SIGNIFICANT CHANGE UP (ref 13–44)
MAGNESIUM SERPL-MCNC: 1.9 MG/DL — SIGNIFICANT CHANGE UP (ref 1.6–2.6)
MCHC RBC-ENTMCNC: 27.7 PG — SIGNIFICANT CHANGE UP (ref 27–34)
MCHC RBC-ENTMCNC: 32.3 GM/DL — SIGNIFICANT CHANGE UP (ref 32–36)
MCV RBC AUTO: 85.6 FL — SIGNIFICANT CHANGE UP (ref 80–100)
MONOCYTES # BLD AUTO: 0.8 K/UL — SIGNIFICANT CHANGE UP (ref 0–0.9)
MONOCYTES NFR BLD AUTO: 10.5 % — SIGNIFICANT CHANGE UP (ref 2–14)
NEUTROPHILS # BLD AUTO: 3.5 K/UL — SIGNIFICANT CHANGE UP (ref 1.8–7.4)
NEUTROPHILS NFR BLD AUTO: 49.5 % — SIGNIFICANT CHANGE UP (ref 43–77)
PHENOBARB SERPL-MCNC: 45.4 UG/ML — HIGH (ref 15–40)
PHOSPHATE SERPL-MCNC: 3.9 MG/DL — SIGNIFICANT CHANGE UP (ref 2.5–4.5)
PLATELET # BLD AUTO: 276 K/UL — SIGNIFICANT CHANGE UP (ref 150–400)
POTASSIUM SERPL-MCNC: 3.9 MMOL/L — SIGNIFICANT CHANGE UP (ref 3.5–5.3)
POTASSIUM SERPL-SCNC: 3.9 MMOL/L — SIGNIFICANT CHANGE UP (ref 3.5–5.3)
RBC # BLD: 3.94 M/UL — SIGNIFICANT CHANGE UP (ref 3.8–5.2)
RBC # FLD: 19.4 % — HIGH (ref 10.3–14.5)
SODIUM SERPL-SCNC: 137 MMOL/L — SIGNIFICANT CHANGE UP (ref 135–145)
WBC # BLD: 7.2 K/UL — SIGNIFICANT CHANGE UP (ref 3.8–10.5)
WBC # FLD AUTO: 7.2 K/UL — SIGNIFICANT CHANGE UP (ref 3.8–10.5)

## 2017-10-12 PROCEDURE — 99232 SBSQ HOSP IP/OBS MODERATE 35: CPT | Mod: GC

## 2017-10-12 PROCEDURE — 99232 SBSQ HOSP IP/OBS MODERATE 35: CPT

## 2017-10-12 RX ORDER — TRAZODONE HCL 50 MG
100 TABLET ORAL AT BEDTIME
Qty: 0 | Refills: 0 | Status: DISCONTINUED | OUTPATIENT
Start: 2017-10-12 | End: 2017-10-13

## 2017-10-12 RX ORDER — HALOPERIDOL DECANOATE 100 MG/ML
2.5 INJECTION INTRAMUSCULAR ONCE
Qty: 0 | Refills: 0 | Status: COMPLETED | OUTPATIENT
Start: 2017-10-12 | End: 2017-10-12

## 2017-10-12 RX ORDER — PHENOBARBITAL 60 MG
16.2 TABLET ORAL EVERY 6 HOURS
Qty: 0 | Refills: 0 | Status: DISCONTINUED | OUTPATIENT
Start: 2017-10-12 | End: 2017-10-18

## 2017-10-12 RX ORDER — HALOPERIDOL DECANOATE 100 MG/ML
2.5 INJECTION INTRAMUSCULAR EVERY 6 HOURS
Qty: 0 | Refills: 0 | Status: DISCONTINUED | OUTPATIENT
Start: 2017-10-12 | End: 2017-10-12

## 2017-10-12 RX ORDER — VENLAFAXINE HCL 75 MG
150 CAPSULE, EXT RELEASE 24 HR ORAL DAILY
Qty: 0 | Refills: 0 | Status: DISCONTINUED | OUTPATIENT
Start: 2017-10-13 | End: 2017-10-19

## 2017-10-12 RX ORDER — HALOPERIDOL DECANOATE 100 MG/ML
2.5 INJECTION INTRAMUSCULAR EVERY 6 HOURS
Qty: 0 | Refills: 0 | Status: DISCONTINUED | OUTPATIENT
Start: 2017-10-12 | End: 2017-10-15

## 2017-10-12 RX ADMIN — HALOPERIDOL DECANOATE 2.5 MILLIGRAM(S): 100 INJECTION INTRAMUSCULAR at 15:18

## 2017-10-12 RX ADMIN — Medication 1 MILLIGRAM(S): at 12:39

## 2017-10-12 RX ADMIN — Medication 1 TABLET(S): at 12:39

## 2017-10-12 RX ADMIN — Medication 100 MILLIGRAM(S): at 15:22

## 2017-10-12 RX ADMIN — HALOPERIDOL DECANOATE 2.5 MILLIGRAM(S): 100 INJECTION INTRAMUSCULAR at 08:54

## 2017-10-12 RX ADMIN — Medication 100 MILLIGRAM(S): at 23:13

## 2017-10-12 RX ADMIN — ENOXAPARIN SODIUM 40 MILLIGRAM(S): 100 INJECTION SUBCUTANEOUS at 12:39

## 2017-10-12 RX ADMIN — HALOPERIDOL DECANOATE 2.5 MILLIGRAM(S): 100 INJECTION INTRAMUSCULAR at 06:07

## 2017-10-12 RX ADMIN — Medication 1 PATCH: at 12:39

## 2017-10-12 RX ADMIN — Medication 50 MILLIGRAM(S): at 05:07

## 2017-10-12 RX ADMIN — Medication 50 MILLIGRAM(S): at 18:54
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
## 2017-10-12 NOTE — PROGRESS NOTE BEHAVIORAL HEALTH - NSBHFUPINTERVALHXFT_PSY_A_CORE
No acute events overnight, was given Haldol 0.5mg IM X1 for agitation overnight. She has been transferred to floor, found sleeping when her room was entered. Pt was arousable, remains AAO X3. She remains agreeable to follow up with in-pt rehab and has insight into her alcohol abuse problem. No SI/HI elicited. She remains med seeking per nursing.

## 2017-10-12 NOTE — PROGRESS NOTE ADULT - ASSESSMENT
32F with hx of alcohol and benzo dependance admitted to MICU for RASS -4 after over sedation with ativan (20mg total in 12 hours), hypotension and bradycardia 2/2 benzos now on haldol

## 2017-10-12 NOTE — PROGRESS NOTE BEHAVIORAL HEALTH - SUMMARY
31 y/o Female with PPHx of MDD without psychotic features, Borderline personality disorder, Alcohol use disorder, admitted after pt called 911 seeking help for withdrawal symptoms of alcohol, BAL (365) transferred to MICU for possible withdrawal symptoms. Pt is not actively suicidal or homicidal, but does have a history of impulsivity and self injurious behavior and is at risk of self harm if needs not met while in the hospital.      Pt notably improved, remains AAOX3, continues to have insight into her excessive use of alcohol and remains agreeable to enter 28 days in-pt rehab.

## 2017-10-12 NOTE — PROGRESS NOTE BEHAVIORAL HEALTH - NSBHCONSULTMEDS_PSY_A_CORE FT
1) Please continue Topamax 50mg PO BID for now with plans to increase to 100mg PO BID in 4 days   2) Increase Effexor XR to 150mg PO daily  2) Pt is agreeable to enter in-pt rehab and social work to coordinate this effort  3) Continue 1:1 for impulsivity and mood lability
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
## 2017-10-12 NOTE — PROGRESS NOTE ADULT - ATTENDING COMMENTS
Agree with above. Hold benzos. Watch on tele w/ edmund, likely 2ndary to phenobarb. Levels are 40 which help w/ sedation, though would hold with edmund < 55. EKG today please. Appreciate psych reccs re escalation of psych meds. Continue 1:1 with ultimate plan to d/c with rehab though needs secure psych f/u. Rest as above.

## 2017-10-13 DIAGNOSIS — F10.99 ALCOHOL USE, UNSPECIFIED WITH UNSPECIFIED ALCOHOL-INDUCED DISORDER: ICD-10-CM

## 2017-10-13 DIAGNOSIS — F33.9 MAJOR DEPRESSIVE DISORDER, RECURRENT, UNSPECIFIED: ICD-10-CM

## 2017-10-13 LAB
ALBUMIN SERPL ELPH-MCNC: 3.7 G/DL — SIGNIFICANT CHANGE UP (ref 3.3–5)
ALP SERPL-CCNC: 42 U/L — SIGNIFICANT CHANGE UP (ref 40–120)
ALT FLD-CCNC: 13 U/L — SIGNIFICANT CHANGE UP (ref 10–45)
ANION GAP SERPL CALC-SCNC: 12 MMOL/L — SIGNIFICANT CHANGE UP (ref 5–17)
AST SERPL-CCNC: 22 U/L — SIGNIFICANT CHANGE UP (ref 10–40)
BILIRUB SERPL-MCNC: <0.2 MG/DL — SIGNIFICANT CHANGE UP (ref 0.2–1.2)
BUN SERPL-MCNC: 6 MG/DL — LOW (ref 7–23)
CALCIUM SERPL-MCNC: 8.9 MG/DL — SIGNIFICANT CHANGE UP (ref 8.4–10.5)
CHLORIDE SERPL-SCNC: 100 MMOL/L — SIGNIFICANT CHANGE UP (ref 96–108)
CO2 SERPL-SCNC: 24 MMOL/L — SIGNIFICANT CHANGE UP (ref 22–31)
CREAT SERPL-MCNC: 0.6 MG/DL — SIGNIFICANT CHANGE UP (ref 0.5–1.3)
GLUCOSE SERPL-MCNC: 78 MG/DL — SIGNIFICANT CHANGE UP (ref 70–99)
HCT VFR BLD CALC: 30.5 % — LOW (ref 34.5–45)
HGB BLD-MCNC: 9.4 G/DL — LOW (ref 11.5–15.5)
MAGNESIUM SERPL-MCNC: 1.9 MG/DL — SIGNIFICANT CHANGE UP (ref 1.6–2.6)
MCHC RBC-ENTMCNC: 25.7 PG — LOW (ref 27–34)
MCHC RBC-ENTMCNC: 30.8 GM/DL — LOW (ref 32–36)
MCV RBC AUTO: 83.3 FL — SIGNIFICANT CHANGE UP (ref 80–100)
PHOSPHATE SERPL-MCNC: 3.1 MG/DL — SIGNIFICANT CHANGE UP (ref 2.5–4.5)
PLATELET # BLD AUTO: 287 K/UL — SIGNIFICANT CHANGE UP (ref 150–400)
POTASSIUM SERPL-MCNC: 4 MMOL/L — SIGNIFICANT CHANGE UP (ref 3.5–5.3)
POTASSIUM SERPL-SCNC: 4 MMOL/L — SIGNIFICANT CHANGE UP (ref 3.5–5.3)
PROT SERPL-MCNC: 6.8 G/DL — SIGNIFICANT CHANGE UP (ref 6–8.3)
RBC # BLD: 3.66 M/UL — LOW (ref 3.8–5.2)
RBC # FLD: 20.7 % — HIGH (ref 10.3–14.5)
SODIUM SERPL-SCNC: 136 MMOL/L — SIGNIFICANT CHANGE UP (ref 135–145)
WBC # BLD: 6.45 K/UL — SIGNIFICANT CHANGE UP (ref 3.8–10.5)
WBC # FLD AUTO: 6.45 K/UL — SIGNIFICANT CHANGE UP (ref 3.8–10.5)

## 2017-10-13 PROCEDURE — 99232 SBSQ HOSP IP/OBS MODERATE 35: CPT | Mod: GC

## 2017-10-13 RX ORDER — GABAPENTIN 400 MG/1
300 CAPSULE ORAL THREE TIMES A DAY
Qty: 0 | Refills: 0 | Status: DISCONTINUED | OUTPATIENT
Start: 2017-10-13 | End: 2017-10-19

## 2017-10-13 RX ADMIN — Medication 1 PATCH: at 12:29

## 2017-10-13 RX ADMIN — Medication 100 MILLIGRAM(S): at 12:12

## 2017-10-13 RX ADMIN — GABAPENTIN 300 MILLIGRAM(S): 400 CAPSULE ORAL at 21:24

## 2017-10-13 RX ADMIN — ENOXAPARIN SODIUM 40 MILLIGRAM(S): 100 INJECTION SUBCUTANEOUS at 12:13

## 2017-10-13 RX ADMIN — Medication 1 PATCH: at 12:12

## 2017-10-13 RX ADMIN — Medication 1 TABLET(S): at 12:12

## 2017-10-13 RX ADMIN — HALOPERIDOL DECANOATE 2.5 MILLIGRAM(S): 100 INJECTION INTRAMUSCULAR at 21:40

## 2017-10-13 RX ADMIN — GABAPENTIN 300 MILLIGRAM(S): 400 CAPSULE ORAL at 18:34

## 2017-10-13 RX ADMIN — Medication 1 MILLIGRAM(S): at 12:12

## 2017-10-13 RX ADMIN — Medication 50 MILLIGRAM(S): at 18:36

## 2017-10-13 RX ADMIN — Medication 150 MILLIGRAM(S): at 12:12

## 2017-10-13 RX ADMIN — Medication 50 MILLIGRAM(S): at 06:54

## 2017-10-13 NOTE — DISCHARGE NOTE ADULT - MEDICATION SUMMARY - MEDICATIONS TO TAKE
I will START or STAY ON the medications listed below when I get home from the hospital:    Topamax 100 mg oral tablet  -- 1 tab(s) by mouth 2 times a day   -- Do not chew, break, or crush.  Do not drink alcoholic beverages when taking this medication.  Do not take this drug if you are pregnant.  It is very important that you take or use this exactly as directed.  Do not skip doses or discontinue unless directed by your doctor.  May cause drowsiness or dizziness.  Medication should be taken with plenty of water.  Obtain medical advice before taking any non-prescription drugs as some may affect the action of this medication.  Swallow whole.  Do not crush.  This drug may impair the ability to drive or operate machinery.  Use care until you become familiar with its effects.    -- Indication: For Mood stabilization    gabapentin 300 mg oral capsule  -- 1 cap(s) by mouth 3 times a day   -- It is very important that you take or use this exactly as directed.  Do not skip doses or discontinue unless directed by your doctor.  May cause drowsiness.  Alcohol may intensify this effect.  Use care when operating dangerous machinery.    -- Indication: For Mood/Anxiety    Effexor  mg oral capsule, extended release  -- 1 cap(s) by mouth once a day   -- Do not drink alcoholic beverages when taking this medication.  It is very important that you take or use this exactly as directed.  Do not skip doses or discontinue unless directed by your doctor.  May cause drowsiness.  Alcohol may intensify this effect.  Use care when operating dangerous machinery.  Take with food or milk.    -- Indication: For Depression/PTSD    traZODone 100 mg oral tablet  -- 1 tab(s) by mouth once a day (at bedtime)   -- It is very important that you take or use this exactly as directed.  Do not skip doses or discontinue unless directed by your doctor.  May cause drowsiness.  Alcohol may intensify this effect.  Use care when operating dangerous machinery.  Obtain medical advice before taking any non-prescription drugs as some may affect the action of this medication.  Take with food or milk.    -- Indication: For Mood/Sleep I will START or STAY ON the medications listed below when I get home from the hospital:    gabapentin 300 mg oral capsule  -- 1 cap(s) by mouth 3 times a day   -- It is very important that you take or use this exactly as directed.  Do not skip doses or discontinue unless directed by your doctor.  May cause drowsiness.  Alcohol may intensify this effect.  Use care when operating dangerous machinery.    -- Indication: For Mood/Anxiety    Topamax 100 mg oral tablet  -- 1 tab(s) by mouth 2 times a day   -- Do not chew, break, or crush.  Do not drink alcoholic beverages when taking this medication.  Do not take this drug if you are pregnant.  It is very important that you take or use this exactly as directed.  Do not skip doses or discontinue unless directed by your doctor.  May cause drowsiness or dizziness.  Medication should be taken with plenty of water.  Obtain medical advice before taking any non-prescription drugs as some may affect the action of this medication.  Swallow whole.  Do not crush.  This drug may impair the ability to drive or operate machinery.  Use care until you become familiar with its effects.    -- Indication: For Mood stabilization    traZODone 100 mg oral tablet  -- 1 tab(s) by mouth once a day (at bedtime)   -- It is very important that you take or use this exactly as directed.  Do not skip doses or discontinue unless directed by your doctor.  May cause drowsiness.  Alcohol may intensify this effect.  Use care when operating dangerous machinery.  Obtain medical advice before taking any non-prescription drugs as some may affect the action of this medication.  Take with food or milk.    -- Indication: For Mood/Sleep    Effexor  mg oral capsule, extended release  -- 1 cap(s) by mouth once a day   -- Do not drink alcoholic beverages when taking this medication.  It is very important that you take or use this exactly as directed.  Do not skip doses or discontinue unless directed by your doctor.  May cause drowsiness.  Alcohol may intensify this effect.  Use care when operating dangerous machinery.  Take with food or milk.    -- Indication: For Depression/PTSD    folic acid 1 mg oral tablet  -- 1 tab(s) by mouth once a day  -- Indication: For Supplement    thiamine 100 mg oral tablet  -- 1 tab(s) by mouth once a day  -- Indication: For Supplement I will START or STAY ON the medications listed below when I get home from the hospital:    gabapentin 300 mg oral capsule  -- 1 cap(s) by mouth 3 times a day   -- It is very important that you take or use this exactly as directed.  Do not skip doses or discontinue unless directed by your doctor.  May cause drowsiness.  Alcohol may intensify this effect.  Use care when operating dangerous machinery.    -- Indication: For Mood/Anxiety    Topamax 100 mg oral tablet  -- 1 tab(s) by mouth 2 times a day   -- Do not chew, break, or crush.  Do not drink alcoholic beverages when taking this medication.  Do not take this drug if you are pregnant.  It is very important that you take or use this exactly as directed.  Do not skip doses or discontinue unless directed by your doctor.  May cause drowsiness or dizziness.  Medication should be taken with plenty of water.  Obtain medical advice before taking any non-prescription drugs as some may affect the action of this medication.  Swallow whole.  Do not crush.  This drug may impair the ability to drive or operate machinery.  Use care until you become familiar with its effects.    -- Indication: For Mood stabilization    traZODone 100 mg oral tablet  -- 1 tab(s) by mouth once a day (at bedtime)   -- It is very important that you take or use this exactly as directed.  Do not skip doses or discontinue unless directed by your doctor.  May cause drowsiness.  Alcohol may intensify this effect.  Use care when operating dangerous machinery.  Obtain medical advice before taking any non-prescription drugs as some may affect the action of this medication.  Take with food or milk.    -- Indication: For Mood/Sleep    Effexor  mg oral capsule, extended release  -- 1 cap(s) by mouth once a day   -- Do not drink alcoholic beverages when taking this medication.  It is very important that you take or use this exactly as directed.  Do not skip doses or discontinue unless directed by your doctor.  May cause drowsiness.  Alcohol may intensify this effect.  Use care when operating dangerous machinery.  Take with food or milk.    -- Indication: For Depression/PTSD    busPIRone 7.5 mg oral tablet  -- 1 tab(s) by mouth every 12 hours  -- Indication: For Anxiety    hydrOXYzine hydrochloride 25 mg oral tablet  -- 1 tab(s) by mouth every 6 hours, As needed, Agitation/anxiety  -- Indication: For Anxiety    FeroSul 325 mg (65 mg elemental iron) oral tablet  -- 1 tab(s) by mouth 3 times a day  -- Indication: For Supplement    melatonin 3 mg oral tablet  -- 1 tab(s) by mouth once a day (at bedtime)  -- Indication: For Sleep    nicotine 21 mg/24 hr transdermal film, extended release  --  by transdermal patch   -- Indication: For Tobacco cessation    Multiple Vitamins oral tablet  -- 1 tab(s) by mouth once a day  -- Indication: For Supplement    folic acid 1 mg oral tablet  -- 1 tab(s) by mouth once a day  -- Indication: For Supplement    thiamine 100 mg oral tablet  -- 1 tab(s) by mouth once a day  -- Indication: For Supplement I will START or STAY ON the medications listed below when I get home from the hospital:    gabapentin 300 mg oral capsule  -- 1 cap(s) by mouth 3 times a day   -- It is very important that you take or use this exactly as directed.  Do not skip doses or discontinue unless directed by your doctor.  May cause drowsiness.  Alcohol may intensify this effect.  Use care when operating dangerous machinery.    -- Indication: For Mood/Anxiety    Topamax 100 mg oral tablet  -- 1 tab(s) by mouth 2 times a day   -- Do not chew, break, or crush.  Do not drink alcoholic beverages when taking this medication.  Do not take this drug if you are pregnant.  It is very important that you take or use this exactly as directed.  Do not skip doses or discontinue unless directed by your doctor.  May cause drowsiness or dizziness.  Medication should be taken with plenty of water.  Obtain medical advice before taking any non-prescription drugs as some may affect the action of this medication.  Swallow whole.  Do not crush.  This drug may impair the ability to drive or operate machinery.  Use care until you become familiar with its effects.    -- Indication: For Mood stabilization    traZODone 100 mg oral tablet  -- 1 tab(s) by mouth once a day (at bedtime)   -- It is very important that you take or use this exactly as directed.  Do not skip doses or discontinue unless directed by your doctor.  May cause drowsiness.  Alcohol may intensify this effect.  Use care when operating dangerous machinery.  Obtain medical advice before taking any non-prescription drugs as some may affect the action of this medication.  Take with food or milk.    -- Indication: For Mood/Sleep    Effexor  mg oral capsule, extended release  -- 1 cap(s) by mouth once a day   -- Do not drink alcoholic beverages when taking this medication.  It is very important that you take or use this exactly as directed.  Do not skip doses or discontinue unless directed by your doctor.  May cause drowsiness.  Alcohol may intensify this effect.  Use care when operating dangerous machinery.  Take with food or milk.    -- Indication: For Depression/PTSD    busPIRone 7.5 mg oral tablet  -- 1 tab(s) by mouth every 12 hours  -- Indication: For Anxiety    hydrOXYzine hydrochloride 25 mg oral tablet  -- 1 tab(s) by mouth every 6 hours, As needed, Agitation/anxiety  -- Indication: For Anxiety    FeroSul 325 mg (65 mg elemental iron) oral tablet  -- 1 tab(s) by mouth 3 times a day  -- Indication: For Supplement    melatonin 3 mg oral tablet  -- 1 tab(s) by mouth once a day (at bedtime)  -- Indication: For Sleep    nicotine 21 mg/24 hr transdermal film, extended release  --  by transdermal patch   -- Indication: For Tobacco cessation    folic acid 1 mg oral tablet  -- 1 tab(s) by mouth once a day  -- Indication: For Supplement    thiamine 100 mg oral tablet  -- 1 tab(s) by mouth once a day  -- Indication: For Supplement I will START or STAY ON the medications listed below when I get home from the hospital:    gabapentin 300 mg oral capsule  -- 1 cap(s) by mouth 3 times a day   -- It is very important that you take or use this exactly as directed.  Do not skip doses or discontinue unless directed by your doctor.  May cause drowsiness.  Alcohol may intensify this effect.  Use care when operating dangerous machinery.    -- Indication: For Mood/Anxiety    Topamax 100 mg oral tablet  -- 1 tab(s) by mouth 2 times a day   -- Do not chew, break, or crush.  Do not drink alcoholic beverages when taking this medication.  Do not take this drug if you are pregnant.  It is very important that you take or use this exactly as directed.  Do not skip doses or discontinue unless directed by your doctor.  May cause drowsiness or dizziness.  Medication should be taken with plenty of water.  Obtain medical advice before taking any non-prescription drugs as some may affect the action of this medication.  Swallow whole.  Do not crush.  This drug may impair the ability to drive or operate machinery.  Use care until you become familiar with its effects.    -- Indication: For Mood stabilization    traZODone 100 mg oral tablet  -- 1 tab(s) by mouth once a day (at bedtime)   -- It is very important that you take or use this exactly as directed.  Do not skip doses or discontinue unless directed by your doctor.  May cause drowsiness.  Alcohol may intensify this effect.  Use care when operating dangerous machinery.  Obtain medical advice before taking any non-prescription drugs as some may affect the action of this medication.  Take with food or milk.    -- Indication: For Mood/Sleep    Effexor  mg oral capsule, extended release  -- 1 cap(s) by mouth once a day   -- Do not drink alcoholic beverages when taking this medication.  It is very important that you take or use this exactly as directed.  Do not skip doses or discontinue unless directed by your doctor.  May cause drowsiness.  Alcohol may intensify this effect.  Use care when operating dangerous machinery.  Take with food or milk.    -- Indication: For Depression/PTSD    busPIRone 7.5 mg oral tablet  -- 1 tab(s) by mouth every 12 hours  -- Indication: For Anxiety    hydrOXYzine hydrochloride 25 mg oral tablet  -- 1 tab(s) by mouth every 6 hours, As needed, Agitation/anxiety  -- Indication: For Anxiety    FeroSul 325 mg (65 mg elemental iron) oral tablet  -- 1 tab(s) by mouth 3 times a day  -- Indication: For Supplement    melatonin 3 mg oral tablet  -- 1 tab(s) by mouth once a day (at bedtime)  -- Indication: For Sleep    nicotine 21 mg/24 hr transdermal film, extended release  --  by transdermal patch   -- Indication: For Tobacco cessation    Multiple Vitamins oral tablet  -- 1 tab(s) by mouth once a day  -- Indication: For Alcohol use disorder    folic acid 1 mg oral tablet  -- 1 tab(s) by mouth once a day  -- Indication: For Supplement    thiamine 100 mg oral tablet  -- 1 tab(s) by mouth once a day  -- Indication: For Supplement

## 2017-10-13 NOTE — PROGRESS NOTE ADULT - SUBJECTIVE AND OBJECTIVE BOX
Patient is a 32y old  Female who presents with a chief complaint of alcohol withdrawal (06 Oct 2017 08:09)    SUBJECTIVE / OVERNIGHT EVENTS:  No acute events overnight, patient requested trazadone which was restarted. Patient requesting gabapentin this AM for anxiety and is preoccupied with dosing of psych meds. Reports she feels ready for rehab.     MEDICATIONS  (STANDING):  enoxaparin Injectable 40 milliGRAM(s) SubCutaneous daily  folic acid 1 milliGRAM(s) Oral daily  multivitamin 1 Tablet(s) Oral daily  nicotine - 21 mG/24Hr(s) Patch 1 patch Transdermal daily  thiamine 100 milliGRAM(s) Oral daily  topiramate 50 milliGRAM(s) Oral two times a day  traZODone 100 milliGRAM(s) Oral at bedtime  venlafaxine XR. 150 milliGRAM(s) Oral daily    MEDICATIONS  (PRN):  haloperidol    Injectable 2.5 milliGRAM(s) IV Push every 6 hours PRN agitation  PHENobarbital 16.2 milliGRAM(s) Oral every 6 hours PRN agitation      REVIEW OF SYSTEMS:  CONSTITUTIONAL: Denies weakness, fevers and chills  EYES/ENT: Denies visual changes;  denies vertigo and throat pain   NECK: Denies neck pain and stiffness  RESPIRATORY: Denies shortness of breath, denies cough, wheezing, hemoptysis  CARDIOVASCULAR: Denies chest pain and palpitations  ENDOCRINE: Denies weight loss/gain. Denies cold or heat intolerance  GASTROINTESTINAL: Denies abdominal or epigastric pain. denies nausea, vomiting, and hematemesis; Denies diarrhea & constipation. Denies melena and hematochezia.  GENITOURINARY: Denies dysuria, frequency and hematuria  NEUROLOGICAL: +anxiety  SKIN: Denies itching and rashes    T(C): 36.8 (10-13-17 @ 06:20), Max: 36.8 (10-12-17 @ 12:37)  HR: 67 (10-13-17 @ 06:20) (67 - 84)  BP: 97/63 (10-13-17 @ 06:20) (92/61 - 99/63)  RR: 18 (10-13-17 @ 06:20) (17 - 18)  SpO2: 98% (10-13-17 @ 06:20) (98% - 99%)      PHYSICAL EXAM:  GENERAL: NAD, well-developed  HEAD:  Atraumatic, Normocephalic  EYES: EOMI, PERRLA, conjunctiva and sclera clear  NECK: Supple, No JVD  CHEST/LUNG: Clear to auscultation bilaterally; No wheeze  HEART: Regular rate and rhythm; No murmurs, rubs, or gallops  ABDOMEN: Soft, Nontender, Nondistended; Bowel sounds present  EXTREMITIES:  2+ Peripheral Pulses, No clubbing, cyanosis, or edema  PSYCH: AAOx3  NEUROLOGY: non-focal  SKIN: No rashes or lesions    CAPILLARY BLOOD GLUCOSE        I&O's Summary    12 Oct 2017 07:01  -  13 Oct 2017 07:00  --------------------------------------------------------  IN: 3960 mL / OUT: 0 mL / NET: 3960 mL        LABS                        9.4    6.45  )-----------( 287      ( 13 Oct 2017 08:40 )             30.5     10-12    137  |  101  |  11  ----------------------------<  83  3.9   |  22  |  0.68    Ca    9.0      12 Oct 2017 04:53  Phos  3.9     10-12  Mg     1.9     10-12 Patient is a 32y old  Female who presents with a chief complaint of alcohol withdrawal (06 Oct 2017 08:09)    SUBJECTIVE / OVERNIGHT EVENTS:  No acute events overnight, patient requested trazadone which was restarted. Required 2 doses of prn haldol yesterday. Patient requesting gabapentin this AM for anxiety and is preoccupied with dosing of psych meds. Reports she feels ready for rehab.     MEDICATIONS  (STANDING):  enoxaparin Injectable 40 milliGRAM(s) SubCutaneous daily  folic acid 1 milliGRAM(s) Oral daily  multivitamin 1 Tablet(s) Oral daily  nicotine - 21 mG/24Hr(s) Patch 1 patch Transdermal daily  thiamine 100 milliGRAM(s) Oral daily  topiramate 50 milliGRAM(s) Oral two times a day  traZODone 100 milliGRAM(s) Oral at bedtime  venlafaxine XR. 150 milliGRAM(s) Oral daily    MEDICATIONS  (PRN):  haloperidol    Injectable 2.5 milliGRAM(s) IV Push every 6 hours PRN agitation  PHENobarbital 16.2 milliGRAM(s) Oral every 6 hours PRN agitation      REVIEW OF SYSTEMS:  CONSTITUTIONAL: Denies weakness, fevers and chills  EYES/ENT: Denies visual changes;  denies vertigo and throat pain   NECK: Denies neck pain and stiffness  RESPIRATORY: Denies shortness of breath, denies cough, wheezing, hemoptysis  CARDIOVASCULAR: Denies chest pain and palpitations  ENDOCRINE: Denies weight loss/gain. Denies cold or heat intolerance  GASTROINTESTINAL: Denies abdominal or epigastric pain. denies nausea, vomiting, and hematemesis; Denies diarrhea & constipation. Denies melena and hematochezia.  GENITOURINARY: Denies dysuria, frequency and hematuria  NEUROLOGICAL: +anxiety  SKIN: Denies itching and rashes    T(C): 36.8 (10-13-17 @ 06:20), Max: 36.8 (10-12-17 @ 12:37)  HR: 67 (10-13-17 @ 06:20) (67 - 84)  BP: 97/63 (10-13-17 @ 06:20) (92/61 - 99/63)  RR: 18 (10-13-17 @ 06:20) (17 - 18)  SpO2: 98% (10-13-17 @ 06:20) (98% - 99%)      PHYSICAL EXAM:  GENERAL: NAD, well-developed  HEAD:  Atraumatic, Normocephalic  EYES: EOMI, PERRLA, conjunctiva and sclera clear  NECK: Supple, No JVD  CHEST/LUNG: Clear to auscultation bilaterally; No wheeze  HEART: Regular rate and rhythm; No murmurs, rubs, or gallops  ABDOMEN: Soft, Nontender, Nondistended; Bowel sounds present  EXTREMITIES:  2+ Peripheral Pulses, No clubbing, cyanosis, or edema  PSYCH: AAOx3  NEUROLOGY: non-focal  SKIN: No rashes or lesions    CAPILLARY BLOOD GLUCOSE        I&O's Summary    12 Oct 2017 07:01  -  13 Oct 2017 07:00  --------------------------------------------------------  IN: 3960 mL / OUT: 0 mL / NET: 3960 mL        LABS                        9.4    6.45  )-----------( 287      ( 13 Oct 2017 08:40 )             30.5     10-12    137  |  101  |  11  ----------------------------<  83  3.9   |  22  |  0.68    Ca    9.0      12 Oct 2017 04:53  Phos  3.9     10-12  Mg     1.9     10-12

## 2017-10-13 NOTE — DISCHARGE NOTE ADULT - PROVIDER TOKENS
FREE:[LAST:[Maximus],FIRST:[Roxi],PHONE:[phone]],FAX:[phone]    -],ADDRESS:[[phone] [place] [address]]]

## 2017-10-13 NOTE — PROGRESS NOTE ADULT - ATTENDING COMMENTS
Agree with above. She's doing well, will re-introduce gabapentin 300 tid for anxiety and 1/2 dose gabapentin effective for AUD. When she's tolerating can escalate to 600 TID. To rehab if possible. No benzos. Appreciate psych input re med titration. Rest as above.

## 2017-10-13 NOTE — DISCHARGE NOTE ADULT - PLAN OF CARE
Go to rehab and stop drinking alcohol You came to the hospital after you were found intoxicated. You have struggled with drinking in the past and been to rehab multiple times without being able to remain sober. Recommend inpatient rehab for alcohol use disorder and close follow up as an outpatient with psychiatry and addiction specialists as well as AA. You were treated for alcohol withdrawal in the hospital given your reported history of hallucinations and seizures in the past. Your CIWA scores were very high which triggered significant doses of ativan and you became oversedated which complicated your course by a stay in the MICU. There was concern in the hospital that your CIWA scores were high because you were attempting to get additional doses of ativan. It is not uncommon for people to become addicted to these types of medications in addition to alcohol. It is important that you avoid oversedation with alcohol and benzos as an outpatient. You were eventually weaned off of these medications. You were seen by psychiatry who recommended effexor and topomax. In addition, your gabapentin was restarted as this can also help treat alcohol use disorder. We recommend you avoid trazadone as this medication can also be oversedating. Please continue to take your medications as prescribed and follow up with your psychiatrist as an outpatient. You came to the hospital after you were found intoxicated. You have struggled with drinking in the past and been to rehab multiple times without being able to remain sober. Patient was recommended for inpatient rehab for alcohol use disorder, but given insurance and rehab facility limitations was unable to obtain a spot. You will be discharged to a shelter with resources for establishing care with outpatient community mental health services and addiction specialists as well as AA. You were seen by psychiatry who recommended effexor and topomax. In addition, your gabapentin was restarted as this can also help treat alcohol use disorder. We recommend you avoid drinking while on trazadone as this medication can also be oversedating. Please continue to take your medications as prescribed and follow up with outpatient community mental health services. You came to the hospital after you were found intoxicated. You have struggled with drinking in the past and been to rehab multiple times without being able to remain sober. Patient was recommended for inpatient rehab for alcohol use disorder, but given insurance and rehab facility limitations was unable to obtain a spot. You will be discharged to a shelter with resources for establishing care with outpatient community mental health services and addiction specialists as well as AA. We have made an appointment for you at the Substance Use Program at [place] [address] [place] [address] for Tuesday, October 24th at 9am. Please call [phone] to confirm this appointment. Inpatient psychiatry then to rehab with goal to stop drinking alcohol You came to the hospital after you were found intoxicated. You have struggled with drinking in the past and been to rehab multiple times without being able to remain sober. Patient was recommended for inpatient rehab for alcohol use disorder, but given insurance and rehab facility limitations was unable to obtain a spot. The medicine and psychiatry teams agreed that you should go to inpatient psychiatry for stabilization while awaiting possible placement at inpatient rehab. Ultimately you will need to be followed up as an outpatient with substance use treatment program as well as DBT. We have made an appointment for you at the Substance Use Program at [place] [address]  for Tuesday, October 24th at 9am. Please call [phone] to confirm this appointment. If you are still in the inpatient psychiatry unit at this time, please call and cancel this appointment. You were seen by psychiatry who recommended effexor and topomax. In addition, your gabapentin was restarted as this can also help treat alcohol use disorder. We recommend you avoid drinking while on trazadone as this medication can also be oversedating. Hydroxizine and buspirone were also added for help with managing your anxiety. Please continue to take your medications as prescribed and follow any changes that are made during your inpatient psychiatry stay. You came to the hospital after you were found intoxicated. You have struggled with drinking in the past and been to rehab multiple times without being able to remain sober. Patient was recommended for inpatient rehab for alcohol use disorder, but given insurance and rehab facility limitations was unable to obtain a spot. The medicine and psychiatry teams agreed that you should go to inpatient psychiatry for stabilization while awaiting possible placement at inpatient rehab. Ultimately you will need to be followed up as an outpatient with substance use treatment program as well as DBT. We have made an appointment for you at the Substance Use Program at [place] [address]  for Tuesday, October 24th at 9am. Please call [phone] to confirm this appointment. If you are still in the inpatient psychiatry unit at this time, please call and cancel this appointment. Stop drinking.

## 2017-10-13 NOTE — DISCHARGE NOTE ADULT - NS TRANSFER PATIENT BELONGINGS
Jewelry/Money (specify)/Clothing/head phones, cell phone , white earrings, toiletries, wallet with bank card./Other belongings/Cell Phone/PDA (specify) Jewelry/Money (specify)/Cell Phone/PDA (specify)/Clothing/Other belongings/stuffed animal; black pocketbook; head phones, cell phone , white earrings, toiletries, wallet with bank card.

## 2017-10-13 NOTE — PROGRESS NOTE BEHAVIORAL HEALTH - NSBHFUPINTERVALHXFT_PSY_A_CORE
No acute events overnight, no PRN meds given. She remains AAOX3, engages better in interview. She remains motivated to enter rehab as she "has no where else to go." Writer discussed DSS, to which she stated, "I don't want to have to go there where there's a lot of drugs." She requests to be placed back on Gabapentin which she reports helps with anxiety and trazodone which helps with sleep. No SI/HI elicited. No acute events overnight, no PRN meds given. She remains AAOX3, engages better in interview. She remains motivated to enter rehab as she "has no where else to go." Writer discussed DSS, to which she stated, "I don't want to have to go there where there's a lot of drugs." She requests to be placed back on Trazodone which helps with sleep. No SI/HI elicited.

## 2017-10-13 NOTE — DISCHARGE NOTE ADULT - PATIENT PORTAL LINK FT
“You can access the Follow[facility] Patient Portal, offered by [facility], by registering with the following website: [email or web address]”

## 2017-10-13 NOTE — PROGRESS NOTE ADULT - PROBLEM SELECTOR PLAN 1
Patient with history of depression and anxiety. Also hx of PTSD and BPD.   - continue effexor XR 150mg daily  - topamax 50mg BID with plan to increase to 100mg BID on 10/16

## 2017-10-13 NOTE — PROVIDER CONTACT NOTE (OTHER) - REASON
pt requesting to speak to provider, per pt if they wont give me anything to sleep I will sigh AMA, per pt will need medicine to help me sleep

## 2017-10-13 NOTE — PROGRESS NOTE ADULT - PROBLEM SELECTOR PLAN 3
Patient is homeless and has been to rehab multiple times without success. Patient is motivated to go to inpatient rehab  - SW/CM following, appreciate recs  - resources for establishing care with PMD

## 2017-10-13 NOTE — PROVIDER CONTACT NOTE (OTHER) - RECOMMENDATIONS
MICU consult
Please come and reassess the patient.
Please come assess pt STAT, will continue to monitor
Provider to RN if okay to give Ativan 2 mg IV prn
ativan 2mg IV push given, will follow MONIEWA protocol
ativan?
please come assess pt, will continue to monitor
will continue to monitor
will follow [facility] protocol. will continue to monitor
will give ativan 2mg IV push STAT, will continue to monitor
Team 2 be notified
Please come assess pt, ICU consult re-eval needed. will continue to monitor

## 2017-10-13 NOTE — PROGRESS NOTE ADULT - PROBLEM SELECTOR PLAN 2
Patient with hx of alcohol and benzo use d/o. Treated multiple times for withdrawals c/b seizures and hallucinations. Had complicated withdrawal process in the hospital with oversedation and elevated CIWAs. Treated in the MICU with phenobarbital.   - holding all benzos currently  - haldol 2.5mg PO/IV/IM q6 prn for agitation  - phenobarbitol 16.2mg q6 prn for severe agitation

## 2017-10-13 NOTE — DISCHARGE NOTE ADULT - CARE PLAN
Principal Discharge DX:	Alcohol use disorder  Goal:	Go to rehab and stop drinking alcohol  Instructions for follow-up, activity and diet:	You came to the hospital after you were found intoxicated. You have struggled with drinking in the past and been to rehab multiple times without being able to remain sober. Recommend inpatient rehab for alcohol use disorder and close follow up as an outpatient with psychiatry and addiction specialists as well as AA.  Secondary Diagnosis:	Alcohol withdrawal  Instructions for follow-up, activity and diet:	You were treated for alcohol withdrawal in the hospital given your reported history of hallucinations and seizures in the past. Your CIWA scores were very high which triggered significant doses of ativan and you became oversedated which complicated your course by a stay in the MICU. There was concern in the hospital that your CIWA scores were high because you were attempting to get additional doses of ativan. It is not uncommon for people to become addicted to these types of medications in addition to alcohol. It is important that you avoid oversedation with alcohol and benzos as an outpatient. You were eventually weaned off of these medications.  Secondary Diagnosis:	Major depression, recurrent  Instructions for follow-up, activity and diet:	You were seen by psychiatry who recommended effexor and topomax. In addition, your gabapentin was restarted as this can also help treat alcohol use disorder. We recommend you avoid trazadone as this medication can also be oversedating. Please continue to take your medications as prescribed and follow up with your psychiatrist as an outpatient. Principal Discharge DX:	Alcohol use disorder  Goal:	Go to rehab and stop drinking alcohol  Instructions for follow-up, activity and diet:	You came to the hospital after you were found intoxicated. You have struggled with drinking in the past and been to rehab multiple times without being able to remain sober. Patient was recommended for inpatient rehab for alcohol use disorder, but given insurance and rehab facility limitations was unable to obtain a spot. You will be discharged to a shelter with resources for establishing care with outpatient community mental health services and addiction specialists as well as AA.  Secondary Diagnosis:	Alcohol withdrawal  Instructions for follow-up, activity and diet:	You were treated for alcohol withdrawal in the hospital given your reported history of hallucinations and seizures in the past. Your CIWA scores were very high which triggered significant doses of ativan and you became oversedated which complicated your course by a stay in the MICU. There was concern in the hospital that your CIWA scores were high because you were attempting to get additional doses of ativan. It is not uncommon for people to become addicted to these types of medications in addition to alcohol. It is important that you avoid oversedation with alcohol and benzos as an outpatient. You were eventually weaned off of these medications.  Secondary Diagnosis:	Major depression, recurrent  Instructions for follow-up, activity and diet:	You were seen by psychiatry who recommended effexor and topomax. In addition, your gabapentin was restarted as this can also help treat alcohol use disorder. We recommend you avoid drinking while on trazadone as this medication can also be oversedating. Please continue to take your medications as prescribed and follow up with outpatient community mental health services. Principal Discharge DX:	Alcohol use disorder  Goal:	Go to rehab and stop drinking alcohol  Instructions for follow-up, activity and diet:	You came to the hospital after you were found intoxicated. You have struggled with drinking in the past and been to rehab multiple times without being able to remain sober. Patient was recommended for inpatient rehab for alcohol use disorder, but given insurance and rehab facility limitations was unable to obtain a spot. You will be discharged to a shelter with resources for establishing care with outpatient community mental health services and addiction specialists as well as AA. We have made an appointment for you at the Substance Use Program at [place] [address] for Tuesday, October 24th at 9am. Please call [phone] to confirm this appointment.  Secondary Diagnosis:	Alcohol withdrawal  Instructions for follow-up, activity and diet:	You were treated for alcohol withdrawal in the hospital given your reported history of hallucinations and seizures in the past. Your CIWA scores were very high which triggered significant doses of ativan and you became oversedated which complicated your course by a stay in the MICU. There was concern in the hospital that your CIWA scores were high because you were attempting to get additional doses of ativan. It is not uncommon for people to become addicted to these types of medications in addition to alcohol. It is important that you avoid oversedation with alcohol and benzos as an outpatient. You were eventually weaned off of these medications.  Secondary Diagnosis:	Major depression, recurrent  Instructions for follow-up, activity and diet:	You were seen by psychiatry who recommended effexor and topomax. In addition, your gabapentin was restarted as this can also help treat alcohol use disorder. We recommend you avoid drinking while on trazadone as this medication can also be oversedating. Please continue to take your medications as prescribed and follow up with outpatient community mental health services. Principal Discharge DX:	Alcohol use disorder  Goal:	Inpatient psychiatry then to rehab with goal to stop drinking alcohol  Instructions for follow-up, activity and diet:	You came to the hospital after you were found intoxicated. You have struggled with drinking in the past and been to rehab multiple times without being able to remain sober. Patient was recommended for inpatient rehab for alcohol use disorder, but given insurance and rehab facility limitations was unable to obtain a spot. The medicine and psychiatry teams agreed that you should go to inpatient psychiatry for stabilization while awaiting possible placement at inpatient rehab. Ultimately you will need to be followed up as an outpatient with substance use treatment program as well as DBT. We have made an appointment for you at the Substance Use Program at [place] [address] for Tuesday, October 24th at 9am. Please call [phone] to confirm this appointment. If you are still in the inpatient psychiatry unit at this time, please call and cancel this appointment.  Secondary Diagnosis:	Alcohol withdrawal  Instructions for follow-up, activity and diet:	You were treated for alcohol withdrawal in the hospital given your reported history of hallucinations and seizures in the past. Your CIWA scores were very high which triggered significant doses of ativan and you became oversedated which complicated your course by a stay in the MICU. There was concern in the hospital that your CIWA scores were high because you were attempting to get additional doses of ativan. It is not uncommon for people to become addicted to these types of medications in addition to alcohol. It is important that you avoid oversedation with alcohol and benzos as an outpatient. You were eventually weaned off of these medications.  Secondary Diagnosis:	Major depression, recurrent  Instructions for follow-up, activity and diet:	You were seen by psychiatry who recommended effexor and topomax. In addition, your gabapentin was restarted as this can also help treat alcohol use disorder. We recommend you avoid drinking while on trazadone as this medication can also be oversedating. Hydroxizine and buspirone were also added for help with managing your anxiety. Please continue to take your medications as prescribed and follow any changes that are made during your inpatient psychiatry stay. Principal Discharge DX:	Alcohol use disorder  Goal:	Inpatient psychiatry then to rehab with goal to stop drinking alcohol  Instructions for follow-up, activity and diet:	You came to the hospital after you were found intoxicated. You have struggled with drinking in the past and been to rehab multiple times without being able to remain sober. Patient was recommended for inpatient rehab for alcohol use disorder, but given insurance and rehab facility limitations was unable to obtain a spot. The medicine and psychiatry teams agreed that you should go to inpatient psychiatry for stabilization while awaiting possible placement at inpatient rehab. Ultimately you will need to be followed up as an outpatient with substance use treatment program as well as DBT. We have made an appointment for you at the Substance Use Program at [place] [address] for Tuesday, October 24th at 9am. Please call [phone] to confirm this appointment. If you are still in the inpatient psychiatry unit at this time, please call and cancel this appointment. Stop drinking.  Secondary Diagnosis:	Alcohol withdrawal  Instructions for follow-up, activity and diet:	You were treated for alcohol withdrawal in the hospital given your reported history of hallucinations and seizures in the past. Your CIWA scores were very high which triggered significant doses of ativan and you became oversedated which complicated your course by a stay in the MICU. There was concern in the hospital that your CIWA scores were high because you were attempting to get additional doses of ativan. It is not uncommon for people to become addicted to these types of medications in addition to alcohol. It is important that you avoid oversedation with alcohol and benzos as an outpatient. You were eventually weaned off of these medications.  Secondary Diagnosis:	Major depression, recurrent  Instructions for follow-up, activity and diet:	You were seen by psychiatry who recommended effexor and topomax. In addition, your gabapentin was restarted as this can also help treat alcohol use disorder. We recommend you avoid drinking while on trazadone as this medication can also be oversedating. Hydroxizine and buspirone were also added for help with managing your anxiety. Please continue to take your medications as prescribed and follow any changes that are made during your inpatient psychiatry stay.

## 2017-10-13 NOTE — DISCHARGE NOTE ADULT - ADDITIONAL INSTRUCTIONS
You will follow up with the clinician at inpatient rehab and then should make an appointment with a psychiatrist who can continue to monitor you and prescribe your medications. Please follow up with outpatient community mental health resources and AA for continued management of alcohol use disorder. We have made an appointment for you at the Substance Use Program at [place] [address]  for Tuesday, October 24th at 9am. Please call [phone] to confirm this appointment. Please follow up with outpatient community mental health resources and AA for continued management of alcohol use disorder. You are being discharged to inpatient psychiatry. But, we have also made an appointment for you at the Substance Use Program at [place] [address] for Tuesday, October 24th at 9am. Please call [phone] to either cancel or confirm this appointment, depending on whether you are still inpatient psychiatry at this time. Please follow up with outpatient community mental health resources and AA for continued management of alcohol use disorder.

## 2017-10-13 NOTE — PROGRESS NOTE BEHAVIORAL HEALTH - SUMMARY
31 y/o Female with PPHx of MDD without psychotic features, Borderline personality disorder, Alcohol use disorder, admitted after pt called 911 seeking help for withdrawal symptoms of alcohol, BAL (365) transferred to MICU for possible withdrawal symptoms. Pt is not actively suicidal or homicidal, but does have a history of impulsivity and self injurious behavior and is at risk of self harm if needs not met while in the hospital.      Pt emains AAOX3, continues to have insight into her excessive use of alcohol and remains agreeable to enter 28 days in-pt rehab.

## 2017-10-13 NOTE — PROGRESS NOTE ADULT - ASSESSMENT
31yo woman with PMHx of alcohol use d/o and per report multiple admissions for alcohol WD c/b by hallucinations and seizures who presents with intoxication. Patient has history of multiple attempts at alcohol rehabilitation, inpatient psychiatric admissions, suicide attempts, and cutting behaviors with poor social supports and currently homeless. Patient was admitted for management of alcohol withdrawal and placed on librium taper and CIWA protocol, transitioned to ativan taper. Course c/b by oversedation from triggering of CIWA prns and transferred to MICU. Placed on phenobarbital with decrease in CIWA scores. Now transferred back to floor, holding all benzos and restarted on psych meds.

## 2017-10-13 NOTE — PROGRESS NOTE BEHAVIORAL HEALTH - NSBHCONSULTRECOMMENDOTHER_PSY_A_CORE FT
Pt has been on Gabapentin 300gm PO TID and Trazodone 100mg PO QHS in the past and this can be re-started Pt has been on Trazodone 100mg PO QHS in the past and this can be re-started

## 2017-10-13 NOTE — DISCHARGE NOTE ADULT - INSTRUCTIONS
Please maintain a balanced and regular diet. Please maintain a balanced and regular diet. NO ALCOHOL. Please follow up with MD.

## 2017-10-14 LAB
ALBUMIN SERPL ELPH-MCNC: 3.7 G/DL — SIGNIFICANT CHANGE UP (ref 3.3–5)
ALP SERPL-CCNC: 41 U/L — SIGNIFICANT CHANGE UP (ref 40–120)
ALT FLD-CCNC: 9 U/L — LOW (ref 10–45)
ANION GAP SERPL CALC-SCNC: 12 MMOL/L — SIGNIFICANT CHANGE UP (ref 5–17)
AST SERPL-CCNC: 19 U/L — SIGNIFICANT CHANGE UP (ref 10–40)
BILIRUB SERPL-MCNC: <0.2 MG/DL — SIGNIFICANT CHANGE UP (ref 0.2–1.2)
BUN SERPL-MCNC: 8 MG/DL — SIGNIFICANT CHANGE UP (ref 7–23)
CALCIUM SERPL-MCNC: 9.1 MG/DL — SIGNIFICANT CHANGE UP (ref 8.4–10.5)
CHLORIDE SERPL-SCNC: 106 MMOL/L — SIGNIFICANT CHANGE UP (ref 96–108)
CO2 SERPL-SCNC: 21 MMOL/L — LOW (ref 22–31)
CREAT SERPL-MCNC: 0.68 MG/DL — SIGNIFICANT CHANGE UP (ref 0.5–1.3)
GLUCOSE SERPL-MCNC: 72 MG/DL — SIGNIFICANT CHANGE UP (ref 70–99)
HCT VFR BLD CALC: 30.4 % — LOW (ref 34.5–45)
HGB BLD-MCNC: 9.1 G/DL — LOW (ref 11.5–15.5)
MAGNESIUM SERPL-MCNC: 1.9 MG/DL — SIGNIFICANT CHANGE UP (ref 1.6–2.6)
MCHC RBC-ENTMCNC: 25.5 PG — LOW (ref 27–34)
MCHC RBC-ENTMCNC: 29.9 GM/DL — LOW (ref 32–36)
MCV RBC AUTO: 85.2 FL — SIGNIFICANT CHANGE UP (ref 80–100)
PHOSPHATE SERPL-MCNC: 2.8 MG/DL — SIGNIFICANT CHANGE UP (ref 2.5–4.5)
PLATELET # BLD AUTO: 291 K/UL — SIGNIFICANT CHANGE UP (ref 150–400)
POTASSIUM SERPL-MCNC: 4.1 MMOL/L — SIGNIFICANT CHANGE UP (ref 3.5–5.3)
POTASSIUM SERPL-SCNC: 4.1 MMOL/L — SIGNIFICANT CHANGE UP (ref 3.5–5.3)
PROT SERPL-MCNC: 6.4 G/DL — SIGNIFICANT CHANGE UP (ref 6–8.3)
RBC # BLD: 3.57 M/UL — LOW (ref 3.8–5.2)
RBC # FLD: 20.1 % — HIGH (ref 10.3–14.5)
SODIUM SERPL-SCNC: 139 MMOL/L — SIGNIFICANT CHANGE UP (ref 135–145)
WBC # BLD: 6.26 K/UL — SIGNIFICANT CHANGE UP (ref 3.8–10.5)
WBC # FLD AUTO: 6.26 K/UL — SIGNIFICANT CHANGE UP (ref 3.8–10.5)

## 2017-10-14 PROCEDURE — 99232 SBSQ HOSP IP/OBS MODERATE 35: CPT

## 2017-10-14 PROCEDURE — 99232 SBSQ HOSP IP/OBS MODERATE 35: CPT | Mod: GC

## 2017-10-14 RX ORDER — LANOLIN ALCOHOL/MO/W.PET/CERES
3 CREAM (GRAM) TOPICAL AT BEDTIME
Qty: 0 | Refills: 0 | Status: DISCONTINUED | OUTPATIENT
Start: 2017-10-14 | End: 2017-10-19

## 2017-10-14 RX ORDER — TRAZODONE HCL 50 MG
100 TABLET ORAL AT BEDTIME
Qty: 0 | Refills: 0 | Status: DISCONTINUED | OUTPATIENT
Start: 2017-10-14 | End: 2017-10-19

## 2017-10-14 RX ADMIN — Medication 50 MILLIGRAM(S): at 05:19

## 2017-10-14 RX ADMIN — Medication 50 MILLIGRAM(S): at 17:10

## 2017-10-14 RX ADMIN — ENOXAPARIN SODIUM 40 MILLIGRAM(S): 100 INJECTION SUBCUTANEOUS at 11:18

## 2017-10-14 RX ADMIN — GABAPENTIN 300 MILLIGRAM(S): 400 CAPSULE ORAL at 13:09

## 2017-10-14 RX ADMIN — Medication 1 TABLET(S): at 11:21

## 2017-10-14 RX ADMIN — Medication 3 MILLIGRAM(S): at 21:26

## 2017-10-14 RX ADMIN — Medication 100 MILLIGRAM(S): at 11:21

## 2017-10-14 RX ADMIN — Medication 1 PATCH: at 11:18

## 2017-10-14 RX ADMIN — Medication 1 MILLIGRAM(S): at 11:21

## 2017-10-14 RX ADMIN — Medication 100 MILLIGRAM(S): at 21:26

## 2017-10-14 RX ADMIN — GABAPENTIN 300 MILLIGRAM(S): 400 CAPSULE ORAL at 05:19

## 2017-10-14 RX ADMIN — GABAPENTIN 300 MILLIGRAM(S): 400 CAPSULE ORAL at 21:26

## 2017-10-14 RX ADMIN — Medication 150 MILLIGRAM(S): at 11:21

## 2017-10-14 NOTE — PROGRESS NOTE ADULT - PROBLEM SELECTOR PLAN 2
Patient with hx of alcohol and benzo use d/o. Treated multiple times for withdrawals c/b seizures and hallucinations. Had complicated withdrawal process in the hospital with oversedation and elevated CIWAs. Treated in the MICU with phenobarbital.   - holding all benzos currently  - haldol 2.5mg PO/IV/IM q6 prn for agitation  - phenobarbitol 16.2mg q6 prn for severe agitation Patient with hx of alcohol and benzo use d/o. Treated multiple times for withdrawals c/b seizures and hallucinations. Had complicated withdrawal process in the hospital with oversedation and elevated CIWAs. Treated in the MICU with phenobarbital.   - holding all benzos currently  - gabapentin 300 TID with plan to up-titrate to 600 TID for AUD   - haldol 2.5mg PO/IV/IM q6 prn for agitation  - phenobarbitol 16.2mg q6 prn for severe agitation  - awaiting placement at rehab facility

## 2017-10-14 NOTE — PROGRESS NOTE ADULT - SUBJECTIVE AND OBJECTIVE BOX
Patient is a 32y old  Female who presents with a chief complaint of alcohol withdrawal (13 Oct 2017 15:40)    SUBJECTIVE / OVERNIGHT EVENTS:    MEDICATIONS  (STANDING):  enoxaparin Injectable 40 milliGRAM(s) SubCutaneous daily  folic acid 1 milliGRAM(s) Oral daily  gabapentin 300 milliGRAM(s) Oral three times a day  multivitamin 1 Tablet(s) Oral daily  nicotine - 21 mG/24Hr(s) Patch 1 patch Transdermal daily  thiamine 100 milliGRAM(s) Oral daily  topiramate 50 milliGRAM(s) Oral two times a day  venlafaxine XR. 150 milliGRAM(s) Oral daily    MEDICATIONS  (PRN):  haloperidol    Injectable 2.5 milliGRAM(s) IV Push every 6 hours PRN agitation  PHENobarbital 16.2 milliGRAM(s) Oral every 6 hours PRN agitation      REVIEW OF SYSTEMS:  CONSTITUTIONAL: Denies weakness, fevers and chills  EYES/ENT: Denies visual changes;  denies vertigo and throat pain   NECK: Denies neck pain and stiffness  RESPIRATORY: Denies shortness of breath, denies cough, wheezing, hemoptysis  CARDIOVASCULAR: Denies chest pain and palpitations  ENDOCRINE: Denies weight loss/gain. Denies cold or heat intolerance  GASTROINTESTINAL: Denies abdominal or epigastric pain. denies nausea, vomiting, and hematemesis; Denies diarrhea & constipation. Denies melena and hematochezia.  GENITOURINARY: Denies dysuria, frequency and hematuria  NEUROLOGICAL: Denies numbness and weakness  SKIN: Denies itching and rashes    T(C): 36.6 (10-14-17 @ 05:16), Max: 36.8 (10-13-17 @ 13:14)  HR: 77 (10-14-17 @ 05:16) (70 - 77)  BP: 104/68 (10-14-17 @ 05:16) (92/63 - 104/68)  RR: 18 (10-14-17 @ 05:16) (16 - 96)  SpO2: 100% (10-14-17 @ 05:16) (100% - 100%)      PHYSICAL EXAM:  GENERAL: NAD, well-developed  HEAD:  Atraumatic, Normocephalic  EYES: EOMI, PERRLA, conjunctiva and sclera clear  NECK: Supple, No JVD  CHEST/LUNG: Clear to auscultation bilaterally; No wheeze  HEART: Regular rate and rhythm; No murmurs, rubs, or gallops  ABDOMEN: Soft, Nontender, Nondistended; Bowel sounds present  EXTREMITIES:  2+ Peripheral Pulses, No clubbing, cyanosis, or edema  PSYCH: AAOx3  NEUROLOGY: non-focal  SKIN: No rashes or lesions    CAPILLARY BLOOD GLUCOSE        I&O's Summary    13 Oct 2017 07:01  -  14 Oct 2017 07:00  --------------------------------------------------------  IN: 3350 mL / OUT: 1 mL / NET: 3349 mL        LABS                        9.4    6.45  )-----------( 287      ( 13 Oct 2017 08:40 )             30.5     10-13    136  |  100  |  6<L>  ----------------------------<  78  4.0   |  24  |  0.60    Ca    8.9      13 Oct 2017 08:35  Phos  3.1     10-13  Mg     1.9     10-13    TPro  6.8  /  Alb  3.7  /  TBili  <0.2  /  DBili  x   /  AST  22  /  ALT  13  /  AlkPhos  42  10-13              Microbiology:  Urine Cx:  Blood Cx:    RADIOLOGY & ADDITIONAL TESTS:  X- Ray:  CT:  Ultrasound: Patient is a 32y old  Female who presents with a chief complaint of alcohol withdrawal (13 Oct 2017 15:40)    SUBJECTIVE / OVERNIGHT EVENTS:  No acute events overnight. Patient requested trazadone yesterday evening, but was not given as trying to avoid oversedation and habit forming meds. Patient received haldol prn and slept through the night per nursing. Per patient, she slept only 2 hours and is frustrated this AM. Spent time counseling patient regarding hospital course and medication regimen. Offered melatonin for sleep this AM and patient agreeable to try this and re-evaluate tomorrow.     MEDICATIONS  (STANDING):  enoxaparin Injectable 40 milliGRAM(s) SubCutaneous daily  folic acid 1 milliGRAM(s) Oral daily  gabapentin 300 milliGRAM(s) Oral three times a day  multivitamin 1 Tablet(s) Oral daily  nicotine - 21 mG/24Hr(s) Patch 1 patch Transdermal daily  thiamine 100 milliGRAM(s) Oral daily  topiramate 50 milliGRAM(s) Oral two times a day  venlafaxine XR. 150 milliGRAM(s) Oral daily    MEDICATIONS  (PRN):  haloperidol    Injectable 2.5 milliGRAM(s) IV Push every 6 hours PRN agitation  PHENobarbital 16.2 milliGRAM(s) Oral every 6 hours PRN agitation      REVIEW OF SYSTEMS:  CONSTITUTIONAL: Denies weakness, fevers and chills  EYES/ENT: Denies visual changes;  denies vertigo and throat pain   NECK: Denies neck pain and stiffness  RESPIRATORY: Denies shortness of breath, denies cough, wheezing, hemoptysis  CARDIOVASCULAR: Denies chest pain and palpitations  ENDOCRINE: Denies weight loss/gain. Denies cold or heat intolerance  GASTROINTESTINAL: Denies abdominal or epigastric pain. denies nausea, vomiting, and hematemesis; Denies diarrhea & constipation. Denies melena and hematochezia.  GENITOURINARY: Denies dysuria, frequency and hematuria  NEUROLOGICAL: Denies numbness and weakness  SKIN: Denies itching and rashes    T(C): 36.6 (10-14-17 @ 05:16), Max: 36.8 (10-13-17 @ 13:14)  HR: 77 (10-14-17 @ 05:16) (70 - 77)  BP: 104/68 (10-14-17 @ 05:16) (92/63 - 104/68)  RR: 18 (10-14-17 @ 05:16) (16 - 96)  SpO2: 100% (10-14-17 @ 05:16) (100% - 100%)      PHYSICAL EXAM:  GENERAL: NAD, well-developed  HEAD:  Atraumatic, Normocephalic  EYES: EOMI, PERRLA, conjunctiva and sclera clear  NECK: Supple, No JVD  CHEST/LUNG: Clear to auscultation bilaterally; No wheeze  HEART: Regular rate and rhythm; No murmurs, rubs, or gallops  ABDOMEN: Soft, Nontender, Nondistended; Bowel sounds present  EXTREMITIES:  2+ Peripheral Pulses, No clubbing, cyanosis, or edema  PSYCH: AAOx3  NEUROLOGY: non-focal  SKIN: No rashes or lesions    CAPILLARY BLOOD GLUCOSE        I&O's Summary    13 Oct 2017 07:01  -  14 Oct 2017 07:00  --------------------------------------------------------  IN: 3350 mL / OUT: 1 mL / NET: 3349 mL        LABS                        9.4    6.45  )-----------( 287      ( 13 Oct 2017 08:40 )             30.5     10-13    136  |  100  |  6<L>  ----------------------------<  78  4.0   |  24  |  0.60    Ca    8.9      13 Oct 2017 08:35  Phos  3.1     10-13  Mg     1.9     10-13    TPro  6.8  /  Alb  3.7  /  TBili  <0.2  /  DBili  x   /  AST  22  /  ALT  13  /  AlkPhos  42  10-13

## 2017-10-14 NOTE — PROGRESS NOTE ADULT - PROBLEM SELECTOR PLAN 1
Patient with history of depression and anxiety. Also hx of PTSD and BPD.   - continue effexor XR 150mg daily  - topamax 50mg BID with plan to increase to 100mg BID on 10/16 Patient with history of depression and anxiety. Also hx of PTSD and BPD.   - continue effexor XR 150mg daily  - topamax 50mg BID with plan to increase to 100mg BID on 10/16  - holding trazadone  - restarted on gabapentin 300 TID  - melatonin 3mg for sleep

## 2017-10-14 NOTE — PROGRESS NOTE BEHAVIORAL HEALTH - SUMMARY
This is a 32-y.o. HF patient with PPHx of MDD without psychotic features, Borderline personality disorder, Alcohol use disorder, admitted after pt called 911 seeking help for withdrawal symptoms of alcohol, BAL (365) transferred to MICU for possible withdrawal symptoms. Pt is not actively suicidal or homicidal, but does have a history of impulsivity and self injurious behavior and is at risk of self harm if needs not met while in the hospital.      Pt emains AAOX3, continues to have insight into her excessive use of alcohol and remains agreeable to enter 28 days in-pt rehab.

## 2017-10-14 NOTE — PROGRESS NOTE BEHAVIORAL HEALTH - NSBHCONSULTMEDS_PSY_A_CORE FT
1) Please continue Topamax 50mg PO BID for now with plans to increase to 100mg PO BID in 3 days   2) Continue Effexor XR to 150mg PO daily and Gabapentin 300mg PO TID.  3) Please adjust Trazodone to 100mg PO QHS  4) Pt is agreeable to enter in-pt rehab and social work to coordinate this effort  5) Patient does not require psychiatric 1:1

## 2017-10-14 NOTE — PROGRESS NOTE ADULT - ASSESSMENT
31yo woman with PMHx of alcohol use d/o and per report multiple admissions for alcohol WD c/b by hallucinations and seizures who presents with intoxication. Patient has history of multiple attempts at alcohol rehabilitation, inpatient psychiatric admissions, suicide attempts, and cutting behaviors with poor social supports and currently homeless. Patient was admitted for management of alcohol withdrawal and placed on librium taper and CIWA protocol, transitioned to ativan taper. Course c/b by oversedation from triggering of CIWA prns and transferred to MICU. Placed on phenobarbital with decrease in CIWA scores. Now transferred back to floor, holding all benzos and restarted on psych meds. 31yo woman with PMHx of alcohol use d/o and per report multiple admissions for alcohol WD c/b by hallucinations and seizures who presents with intoxication. Patient has history of multiple attempts at alcohol rehabilitation, inpatient psychiatric admissions, suicide attempts, and cutting behaviors with poor social supports and currently homeless. Patient was admitted for management of alcohol withdrawal and placed on librium taper and CIWA protocol, transitioned to ativan taper. Course c/b by oversedation from triggering of CIWA prns and transferred to MICU. Placed on phenobarbital with decrease in CIWA scores. Now transferred back to floor, holding all benzos and restarted on psych meds. Awaiting placement at rehab facility.

## 2017-10-14 NOTE — PROGRESS NOTE BEHAVIORAL HEALTH - NSBHFUPINTERVALHXFT_PSY_A_CORE
No acute events overnight, no PRN meds given. Patient remains AAOX3, engages better in interview. She remains motivated to enter rehab as she "has no where else to go." She requests to be placed back on a higher dose of Trazodone which helps with sleep. No SI/HI elicited.

## 2017-10-14 NOTE — PROGRESS NOTE ADULT - PROBLEM SELECTOR PLAN 3
Patient is homeless and has been to rehab multiple times without success. Patient is motivated to go to inpatient rehab  - SW/CM following, appreciate recs  - resources for establishing care with PMD Patient is homeless and has been to rehab multiple times without success. Patient is motivated to go to inpatient rehab  - SW/CM following, appreciate recs  - SW w/ difficulty placing patient at rehab given insurance limitations and has been rejected from multiple facilities given history  - resources for establishing care with PMD Patient is homeless and has been to rehab multiple times without success. Patient is motivated to go to inpatient rehab  - SW/CM following, appreciate recs  - SW w/ difficulty placing patient at rehab given insurance limitations and has been rejected from multiple facilities given history  - resources for establishing care with PMD  - will d/c 1:1 and start enhanced supervision

## 2017-10-15 LAB
FERRITIN SERPL-MCNC: 26 NG/ML — SIGNIFICANT CHANGE UP (ref 15–150)
HCT VFR BLD CALC: 33.7 % — LOW (ref 34.5–45)
HGB BLD-MCNC: 10.1 G/DL — LOW (ref 11.5–15.5)
IRON SATN MFR SERPL: 18 UG/DL — LOW (ref 30–160)
IRON SATN MFR SERPL: 5 % — LOW (ref 14–50)
MCHC RBC-ENTMCNC: 25.6 PG — LOW (ref 27–34)
MCHC RBC-ENTMCNC: 30 GM/DL — LOW (ref 32–36)
MCV RBC AUTO: 85.5 FL — SIGNIFICANT CHANGE UP (ref 80–100)
PLATELET # BLD AUTO: 321 K/UL — SIGNIFICANT CHANGE UP (ref 150–400)
RBC # BLD: 3.94 M/UL — SIGNIFICANT CHANGE UP (ref 3.8–5.2)
RBC # FLD: 20.8 % — HIGH (ref 10.3–14.5)
TIBC SERPL-MCNC: 396 UG/DL — SIGNIFICANT CHANGE UP (ref 220–430)
UIBC SERPL-MCNC: 378 UG/DL — HIGH (ref 110–370)
WBC # BLD: 6.96 K/UL — SIGNIFICANT CHANGE UP (ref 3.8–10.5)
WBC # FLD AUTO: 6.96 K/UL — SIGNIFICANT CHANGE UP (ref 3.8–10.5)

## 2017-10-15 PROCEDURE — 99232 SBSQ HOSP IP/OBS MODERATE 35: CPT

## 2017-10-15 PROCEDURE — 99232 SBSQ HOSP IP/OBS MODERATE 35: CPT | Mod: GC

## 2017-10-15 RX ORDER — TOPIRAMATE 25 MG
50 TABLET ORAL
Qty: 0 | Refills: 0 | Status: DISCONTINUED | OUTPATIENT
Start: 2017-10-15 | End: 2017-10-16

## 2017-10-15 RX ORDER — TOPIRAMATE 25 MG
100 TABLET ORAL
Qty: 0 | Refills: 0 | Status: DISCONTINUED | OUTPATIENT
Start: 2017-10-16 | End: 2017-10-19

## 2017-10-15 RX ORDER — ACETAMINOPHEN 500 MG
650 TABLET ORAL ONCE
Qty: 0 | Refills: 0 | Status: COMPLETED | OUTPATIENT
Start: 2017-10-15 | End: 2017-10-15

## 2017-10-15 RX ORDER — HALOPERIDOL DECANOATE 100 MG/ML
2 INJECTION INTRAMUSCULAR THREE TIMES A DAY
Qty: 0 | Refills: 0 | Status: DISCONTINUED | OUTPATIENT
Start: 2017-10-15 | End: 2017-10-19

## 2017-10-15 RX ADMIN — Medication 1 PATCH: at 12:30

## 2017-10-15 RX ADMIN — HALOPERIDOL DECANOATE 2 MILLIGRAM(S): 100 INJECTION INTRAMUSCULAR at 08:28

## 2017-10-15 RX ADMIN — Medication 100 MILLIGRAM(S): at 12:30

## 2017-10-15 RX ADMIN — Medication 50 MILLIGRAM(S): at 16:27

## 2017-10-15 RX ADMIN — Medication 50 MILLIGRAM(S): at 05:57

## 2017-10-15 RX ADMIN — Medication 3 MILLIGRAM(S): at 22:16

## 2017-10-15 RX ADMIN — Medication 100 MILLIGRAM(S): at 22:17

## 2017-10-15 RX ADMIN — Medication 150 MILLIGRAM(S): at 12:30

## 2017-10-15 RX ADMIN — GABAPENTIN 300 MILLIGRAM(S): 400 CAPSULE ORAL at 22:16

## 2017-10-15 RX ADMIN — Medication 1 TABLET(S): at 12:30

## 2017-10-15 RX ADMIN — GABAPENTIN 300 MILLIGRAM(S): 400 CAPSULE ORAL at 12:30

## 2017-10-15 RX ADMIN — Medication 650 MILLIGRAM(S): at 12:30

## 2017-10-15 RX ADMIN — GABAPENTIN 300 MILLIGRAM(S): 400 CAPSULE ORAL at 05:57

## 2017-10-15 RX ADMIN — ENOXAPARIN SODIUM 40 MILLIGRAM(S): 100 INJECTION SUBCUTANEOUS at 12:30

## 2017-10-15 RX ADMIN — Medication 1 MILLIGRAM(S): at 12:30

## 2017-10-15 RX ADMIN — Medication 650 MILLIGRAM(S): at 10:43

## 2017-10-15 NOTE — PROGRESS NOTE BEHAVIORAL HEALTH - NSBHFUPINTERVALHXFT_PSY_A_CORE
Patient seen and evaluated, staff consulted, chart, labs, meds reviewed. Patient did well off 1:1. No acute events overnight, no PRN meds given. Patient remains AAOX3, engages better in interview. Significant improvement in mood a demeanor noted as her meds have been restarted. She remains motivated to enter rehab as she "has no where else to go." No SI/HI elicited.

## 2017-10-15 NOTE — PROGRESS NOTE ADULT - ASSESSMENT
33yo woman with PMHx of alcohol use d/o and per report multiple admissions for alcohol WD c/b by hallucinations and seizures who presents with intoxication. Patient has history of multiple attempts at alcohol rehabilitation, inpatient psychiatric admissions, suicide attempts, and cutting behaviors with poor social supports and currently homeless. Patient was admitted for management of alcohol withdrawal and placed on librium taper and CIWA protocol, transitioned to ativan taper. Course c/b by oversedation from triggering of CIWA prns and transferred to MICU. Placed on phenobarbital with decrease in CIWA scores. Now transferred back to floor, holding all benzos and restarted on psych meds. Awaiting placement at rehab facility.

## 2017-10-15 NOTE — PROGRESS NOTE ADULT - PROBLEM SELECTOR PLAN 3
Patient is homeless and has been to rehab multiple times without success. Patient is motivated to go to inpatient rehab  - SW/CM following, appreciate recs  - SW w/ difficulty placing patient at rehab given insurance limitations and has been rejected from multiple facilities given history  - resources for establishing care with PMD  - will d/c 1:1 and start enhanced supervision Patient is homeless and has been to rehab multiple times without success. Patient is motivated to go to inpatient rehab  - SW/CM following, appreciate recs  - SW w/ difficulty placing patient at rehab given insurance limitations and has been rejected from multiple facilities given history  - resources for establishing care with PMD  - no longer on 1:1

## 2017-10-15 NOTE — PROGRESS NOTE ADULT - PROBLEM SELECTOR PLAN 1
Patient with history of depression and anxiety. Also hx of PTSD and BPD.   - continue effexor XR 150mg daily  - topamax 50mg BID with plan to increase to 100mg BID on 10/16  - holding trazadone  - restarted on gabapentin 300 TID  - melatonin 3mg for sleep Patient with history of depression and anxiety. Also hx of PTSD and BPD.   - continue effexor XR 150mg daily  - topamax 50mg BID with plan to increase to 100mg BID on 10/16  - started trazadone 100mg qhs  - continue gabapentin 300 TID  - melatonin 3mg for sleep

## 2017-10-15 NOTE — PROGRESS NOTE ADULT - PROBLEM SELECTOR PLAN 2
Patient with hx of alcohol and benzo use d/o. Treated multiple times for withdrawals c/b seizures and hallucinations. Had complicated withdrawal process in the hospital with oversedation and elevated CIWAs. Treated in the MICU with phenobarbital.   - holding all benzos currently  - gabapentin 300 TID with plan to up-titrate to 600 TID for AUD   - haldol 2.5mg PO/IV/IM q6 prn for agitation  - phenobarbitol 16.2mg q6 prn for severe agitation  - awaiting placement at rehab facility

## 2017-10-15 NOTE — PROGRESS NOTE ADULT - SUBJECTIVE AND OBJECTIVE BOX
Patient is a 32y old  Female who presents with a chief complaint of alcohol withdrawal (13 Oct 2017 15:40)      SUBJECTIVE / OVERNIGHT EVENTS:    MEDICATIONS  (STANDING):  enoxaparin Injectable 40 milliGRAM(s) SubCutaneous daily  folic acid 1 milliGRAM(s) Oral daily  gabapentin 300 milliGRAM(s) Oral three times a day  melatonin 3 milliGRAM(s) Oral at bedtime  multivitamin 1 Tablet(s) Oral daily  nicotine - 21 mG/24Hr(s) Patch 1 patch Transdermal daily  thiamine 100 milliGRAM(s) Oral daily  topiramate 50 milliGRAM(s) Oral two times a day  traZODone 100 milliGRAM(s) Oral at bedtime  venlafaxine XR. 150 milliGRAM(s) Oral daily    MEDICATIONS  (PRN):  haloperidol    Injectable 2.5 milliGRAM(s) IV Push every 6 hours PRN agitation  PHENobarbital 16.2 milliGRAM(s) Oral every 6 hours PRN agitation      REVIEW OF SYSTEMS:  CONSTITUTIONAL: Denies weakness, fevers and chills  EYES/ENT: Denies visual changes;  denies vertigo and throat pain   NECK: Denies neck pain and stiffness  RESPIRATORY: Denies shortness of breath, denies cough, wheezing, hemoptysis  CARDIOVASCULAR: Denies chest pain and palpitations  ENDOCRINE: Denies weight loss/gain. Denies cold or heat intolerance  GASTROINTESTINAL: Denies abdominal or epigastric pain. denies nausea, vomiting, and hematemesis; Denies diarrhea & constipation. Denies melena and hematochezia.  GENITOURINARY: Denies dysuria, frequency and hematuria  NEUROLOGICAL: Denies numbness and weakness  SKIN: Denies itching and rashes    T(C): 37.1 (10-15-17 @ 05:56), Max: 37.1 (10-15-17 @ 05:56)  HR: 81 (10-15-17 @ 05:56) (74 - 106)  BP: 100/66 (10-15-17 @ 05:56) (98/62 - 114/78)  RR: 18 (10-15-17 @ 05:56) (18 - 18)  SpO2: 97% (10-15-17 @ 05:56) (97% - 100%)      PHYSICAL EXAM:  GENERAL: NAD, well-developed  HEAD:  Atraumatic, Normocephalic  EYES: EOMI, PERRLA, conjunctiva and sclera clear  NECK: Supple, No JVD  CHEST/LUNG: Clear to auscultation bilaterally; No wheeze  HEART: Regular rate and rhythm; No murmurs, rubs, or gallops  ABDOMEN: Soft, Nontender, Nondistended; Bowel sounds present  EXTREMITIES:  2+ Peripheral Pulses, No clubbing, cyanosis, or edema  PSYCH: AAOx3  NEUROLOGY: non-focal  SKIN: No rashes or lesions    CAPILLARY BLOOD GLUCOSE        I&O's Summary    14 Oct 2017 07:01  -  15 Oct 2017 07:00  --------------------------------------------------------  IN: 2410 mL / OUT: 0 mL / NET: 2410 mL        LABS                        9.1    6.26  )-----------( 291      ( 14 Oct 2017 08:19 )             30.4     10-14    139  |  106  |  8   ----------------------------<  72  4.1   |  21<L>  |  0.68    Ca    9.1      14 Oct 2017 08:38  Phos  2.8     10-14  Mg     1.9     10-14    TPro  6.4  /  Alb  3.7  /  TBili  <0.2  /  DBili  x   /  AST  19  /  ALT  9<L>  /  AlkPhos  41  10-14              Microbiology:  Urine Cx:  Blood Cx:    RADIOLOGY & ADDITIONAL TESTS:  X- Ray:  CT:  Ultrasound: Patient is a 32y old  Female who presents with a chief complaint of alcohol withdrawal (13 Oct 2017 15:40)    SUBJECTIVE / OVERNIGHT EVENTS:  No acute events overnight. Slept well after addition of trazadone. Patient reports feelings of depression and anxiety this AM. Doesn't think she is going to be accepted at rehab facility. Requesting prn for anxiety. Explained we can no longer given benzos/ativan. Also would like to avoid IV medications as will improve chances of being accepted at rehab. Agreeable to PO haldol for agitation.     MEDICATIONS  (STANDING):  enoxaparin Injectable 40 milliGRAM(s) SubCutaneous daily  folic acid 1 milliGRAM(s) Oral daily  gabapentin 300 milliGRAM(s) Oral three times a day  melatonin 3 milliGRAM(s) Oral at bedtime  multivitamin 1 Tablet(s) Oral daily  nicotine - 21 mG/24Hr(s) Patch 1 patch Transdermal daily  thiamine 100 milliGRAM(s) Oral daily  topiramate 50 milliGRAM(s) Oral two times a day  traZODone 100 milliGRAM(s) Oral at bedtime  venlafaxine XR. 150 milliGRAM(s) Oral daily    MEDICATIONS  (PRN):  haloperidol    Injectable 2.5 milliGRAM(s) IV Push every 6 hours PRN agitation  PHENobarbital 16.2 milliGRAM(s) Oral every 6 hours PRN agitation      REVIEW OF SYSTEMS:  CONSTITUTIONAL: Denies weakness, fevers and chills +anxiety/depression  EYES/ENT: Denies visual changes;  denies vertigo and throat pain   NECK: Denies neck pain and stiffness  RESPIRATORY: Denies shortness of breath, denies cough, wheezing, hemoptysis  CARDIOVASCULAR: Denies chest pain and palpitations  ENDOCRINE: Denies weight loss/gain. Denies cold or heat intolerance  GASTROINTESTINAL: Denies abdominal or epigastric pain. denies nausea, vomiting, and hematemesis; Denies diarrhea & constipation. Denies melena and hematochezia.  GENITOURINARY: Denies dysuria, frequency and hematuria  NEUROLOGICAL: Denies numbness and weakness  SKIN: Denies itching and rashes    T(C): 37.1 (10-15-17 @ 05:56), Max: 37.1 (10-15-17 @ 05:56)  HR: 81 (10-15-17 @ 05:56) (74 - 106)  BP: 100/66 (10-15-17 @ 05:56) (98/62 - 114/78)  RR: 18 (10-15-17 @ 05:56) (18 - 18)  SpO2: 97% (10-15-17 @ 05:56) (97% - 100%)      PHYSICAL EXAM:  GENERAL: NAD, well-developed  HEAD:  Atraumatic, Normocephalic  EYES: EOMI, PERRLA, conjunctiva and sclera clear  NECK: Supple, No JVD  CHEST/LUNG: Clear to auscultation bilaterally; No wheeze  HEART: Regular rate and rhythm; No murmurs, rubs, or gallops  ABDOMEN: Soft, Nontender, Nondistended; Bowel sounds present  EXTREMITIES:  2+ Peripheral Pulses, No clubbing, cyanosis, or edema  PSYCH: AAOx3, +anxiety  NEUROLOGY: non-focal  SKIN: No rashes or lesions    CAPILLARY BLOOD GLUCOSE        I&O's Summary    14 Oct 2017 07:01  -  15 Oct 2017 07:00  --------------------------------------------------------  IN: 2410 mL / OUT: 0 mL / NET: 2410 mL        LABS                        9.1    6.26  )-----------( 291      ( 14 Oct 2017 08:19 )             30.4     10-14    139  |  106  |  8   ----------------------------<  72  4.1   |  21<L>  |  0.68    Ca    9.1      14 Oct 2017 08:38  Phos  2.8     10-14  Mg     1.9     10-14    TPro  6.4  /  Alb  3.7  /  TBili  <0.2  /  DBili  x   /  AST  19  /  ALT  9<L>  /  AlkPhos  41  10-14

## 2017-10-16 PROCEDURE — 99232 SBSQ HOSP IP/OBS MODERATE 35: CPT | Mod: GC

## 2017-10-16 RX ORDER — TRAZODONE HCL 50 MG
1 TABLET ORAL
Qty: 30 | Refills: 1 | OUTPATIENT
Start: 2017-10-16 | End: 2017-12-14

## 2017-10-16 RX ORDER — VENLAFAXINE HCL 75 MG
1 CAPSULE, EXT RELEASE 24 HR ORAL
Qty: 30 | Refills: 1 | OUTPATIENT
Start: 2017-10-16 | End: 2017-12-14

## 2017-10-16 RX ORDER — TOPIRAMATE 25 MG
1 TABLET ORAL
Qty: 60 | Refills: 1 | OUTPATIENT
Start: 2017-10-16 | End: 2017-12-14

## 2017-10-16 RX ORDER — GABAPENTIN 400 MG/1
1 CAPSULE ORAL
Qty: 90 | Refills: 1 | OUTPATIENT
Start: 2017-10-16 | End: 2017-12-14

## 2017-10-16 RX ADMIN — GABAPENTIN 300 MILLIGRAM(S): 400 CAPSULE ORAL at 13:52

## 2017-10-16 RX ADMIN — GABAPENTIN 300 MILLIGRAM(S): 400 CAPSULE ORAL at 05:27

## 2017-10-16 RX ADMIN — Medication 1 MILLIGRAM(S): at 13:51

## 2017-10-16 RX ADMIN — Medication 100 MILLIGRAM(S): at 05:27

## 2017-10-16 RX ADMIN — Medication 1 PATCH: at 13:49

## 2017-10-16 RX ADMIN — ENOXAPARIN SODIUM 40 MILLIGRAM(S): 100 INJECTION SUBCUTANEOUS at 13:50

## 2017-10-16 RX ADMIN — Medication 1 TABLET(S): at 13:52

## 2017-10-16 RX ADMIN — Medication 1 PATCH: at 13:50

## 2017-10-16 RX ADMIN — Medication 150 MILLIGRAM(S): at 13:50

## 2017-10-16 RX ADMIN — Medication 100 MILLIGRAM(S): at 13:51

## 2017-10-16 RX ADMIN — Medication 100 MILLIGRAM(S): at 17:26

## 2017-10-16 NOTE — PROGRESS NOTE ADULT - PROBLEM SELECTOR PLAN 3
Patient is homeless and has been to rehab multiple times without success. Patient is motivated to go to inpatient rehab  - SW/CM following, appreciate recs  - SW w/ difficulty placing patient at rehab given insurance limitations and has been rejected from multiple facilities given history  - resources for establishing care with PMD and outpatient mental health services  - doing well off 1:1

## 2017-10-16 NOTE — PROGRESS NOTE ADULT - PROBLEM SELECTOR PLAN 2
Patient with hx of alcohol and benzo use d/o. Treated multiple times for withdrawals c/b seizures and hallucinations. Had complicated withdrawal process in the hospital with oversedation and elevated CIWAs. Treated in the MICU with phenobarbital.   - holding all benzos currently  - gabapentin 300 TID with plan to up-titrate to 600 TID for AUD   - haldol 2.5mg PO/IV/IM q6 prn for agitation  - phenobarbitol 16.2mg q6 prn for severe agitation  - awaiting placement at rehab facility vs shelter  - outpatient mental health services information Patient with hx of alcohol and benzo use d/o. Treated multiple times for withdrawals c/b seizures and hallucinations. Had complicated withdrawal process in the hospital with oversedation and elevated CIWAs. Treated in the MICU with phenobarbital.   - holding all benzos currently  - gabapentin 300 TID with plan to up-titrate to 600 TID for AUD   - haldol 2.5mg PO/IV/IM q6 prn for agitation  - phenobarbitol 16.2mg q6 prn for severe agitation  - awaiting placement at rehab facility vs shelter  - outpatient mental health services information  -C/w thiamine, MVI, and folate daily.

## 2017-10-16 NOTE — PROGRESS NOTE ADULT - SUBJECTIVE AND OBJECTIVE BOX
Patient is a 32y old  Female who presents with a chief complaint of alcohol withdrawal (13 Oct 2017 15:40)    SUBJECTIVE / OVERNIGHT EVENTS:  No acute events overnight. Patient reports good sleep. Understands plan to find rehab placement today and if unsuccessful possible shelter placement. Patient expressed preferences.     MEDICATIONS  (STANDING):  enoxaparin Injectable 40 milliGRAM(s) SubCutaneous daily  folic acid 1 milliGRAM(s) Oral daily  gabapentin 300 milliGRAM(s) Oral three times a day  melatonin 3 milliGRAM(s) Oral at bedtime  multivitamin 1 Tablet(s) Oral daily  nicotine - 21 mG/24Hr(s) Patch 1 patch Transdermal daily  thiamine 100 milliGRAM(s) Oral daily  topiramate 100 milliGRAM(s) Oral two times a day  traZODone 100 milliGRAM(s) Oral at bedtime  venlafaxine XR. 150 milliGRAM(s) Oral daily    MEDICATIONS  (PRN):  haloperidol     Tablet 2 milliGRAM(s) Oral three times a day PRN agitation  PHENobarbital 16.2 milliGRAM(s) Oral every 6 hours PRN agitation      REVIEW OF SYSTEMS:  CONSTITUTIONAL: Denies weakness, fevers and chills  EYES/ENT: Denies visual changes;  denies vertigo and throat pain   NECK: Denies neck pain and stiffness  RESPIRATORY: Denies shortness of breath, denies cough, wheezing, hemoptysis  CARDIOVASCULAR: Denies chest pain and palpitations  ENDOCRINE: Denies weight loss/gain. Denies cold or heat intolerance  GASTROINTESTINAL: Denies abdominal or epigastric pain. denies nausea, vomiting, and hematemesis; Denies diarrhea & constipation. Denies melena and hematochezia.  GENITOURINARY: Denies dysuria, frequency and hematuria  NEUROLOGICAL: Denies numbness and weakness  SKIN: Denies itching and rashes    T(C): 37.1 (10-16-17 @ 05:25), Max: 37.2 (10-15-17 @ 20:33)  HR: 81 (10-16-17 @ 05:25) (81 - 97)  BP: 105/73 (10-16-17 @ 05:25) (103/67 - 117/78)  RR: 18 (10-16-17 @ 05:25) (18 - 18)  SpO2: 98% (10-16-17 @ 05:25) (95% - 100%)      PHYSICAL EXAM:  GENERAL: NAD, well-developed  HEAD:  Atraumatic, Normocephalic  EYES: EOMI, PERRLA, conjunctiva and sclera clear  NECK: Supple, No JVD  CHEST/LUNG: Clear to auscultation bilaterally; No wheeze  HEART: Regular rate and rhythm; No murmurs, rubs, or gallops  ABDOMEN: Soft, Nontender, Nondistended; Bowel sounds present  EXTREMITIES:  2+ Peripheral Pulses, No clubbing, cyanosis, or edema  PSYCH: AAOx3  NEUROLOGY: non-focal  SKIN: No rashes or lesions    CAPILLARY BLOOD GLUCOSE        I&O's Summary    15 Oct 2017 07:01  -  16 Oct 2017 07:00  --------------------------------------------------------  IN: 1780 mL / OUT: 0 mL / NET: 1780 mL        LABS                        10.1   6.96  )-----------( 321      ( 15 Oct 2017 09:05 )             33.7

## 2017-10-16 NOTE — PROGRESS NOTE ADULT - ATTENDING COMMENTS
-Patient says she's anxious. Wants to go to rehab. Inpatient rehab would likely be the best situation for this patient, pending approval/placement. If patient goes to a shelter, she's more likely to relapse.

## 2017-10-16 NOTE — PROVIDER CONTACT NOTE (MEDICATION) - ACTION/TREATMENT ORDERED:
Team 2 MD Grande made aware, topamax 50mg to be discontinued Team 2 MD Jazmine Wolfe  made aware, topamax 50mg to be discontinued

## 2017-10-16 NOTE — PROGRESS NOTE ADULT - ASSESSMENT
33yo woman with PMHx of alcohol use d/o and per report multiple admissions for alcohol WD c/b by hallucinations and seizures who presents with intoxication. Patient has history of multiple attempts at alcohol rehabilitation, inpatient psychiatric admissions, suicide attempts, and cutting behaviors with poor social supports and currently homeless. Patient was admitted for management of alcohol withdrawal and placed on librium taper and CIWA protocol, transitioned to ativan taper. Course c/b by oversedation from triggering of CIWA prns and transferred to MICU. Placed on phenobarbital with decrease in CIWA scores. Now transferred back to floor, holding all benzos and restarted on psych meds. Awaiting placement at rehab facility vs shelter placement.

## 2017-10-16 NOTE — PROGRESS NOTE ADULT - PROBLEM SELECTOR PLAN 1
Patient with history of depression and anxiety. Also hx of PTSD and BPD.   - continue effexor XR 150mg daily  - increased topomax to 100mg BID today  - continue trazadone 100mg qhs  - continue gabapentin 300 TID  - melatonin 3mg for sleep

## 2017-10-16 NOTE — PROVIDER CONTACT NOTE (MEDICATION) - SITUATION
RN asking about morning Topamax medication @Topamax 50 mg and Topamax 100 mg scheduled for this morning

## 2017-10-17 DIAGNOSIS — D50.8 OTHER IRON DEFICIENCY ANEMIAS: ICD-10-CM

## 2017-10-17 PROCEDURE — 99232 SBSQ HOSP IP/OBS MODERATE 35: CPT | Mod: GC

## 2017-10-17 RX ORDER — THIAMINE MONONITRATE (VIT B1) 100 MG
1 TABLET ORAL
Qty: 30 | Refills: 0 | OUTPATIENT
Start: 2017-10-17 | End: 2017-11-16

## 2017-10-17 RX ORDER — FOLIC ACID 0.8 MG
1 TABLET ORAL
Qty: 30 | Refills: 0 | OUTPATIENT
Start: 2017-10-17 | End: 2017-11-16

## 2017-10-17 RX ORDER — HALOPERIDOL DECANOATE 100 MG/ML
2.5 INJECTION INTRAMUSCULAR ONCE
Qty: 0 | Refills: 0 | Status: COMPLETED | OUTPATIENT
Start: 2017-10-17 | End: 2017-10-17

## 2017-10-17 RX ORDER — FERROUS SULFATE 325(65) MG
325 TABLET ORAL DAILY
Qty: 0 | Refills: 0 | Status: DISCONTINUED | OUTPATIENT
Start: 2017-10-17 | End: 2017-10-19

## 2017-10-17 RX ADMIN — GABAPENTIN 300 MILLIGRAM(S): 400 CAPSULE ORAL at 13:38

## 2017-10-17 RX ADMIN — Medication 100 MILLIGRAM(S): at 05:48

## 2017-10-17 RX ADMIN — GABAPENTIN 300 MILLIGRAM(S): 400 CAPSULE ORAL at 23:05

## 2017-10-17 RX ADMIN — Medication 100 MILLIGRAM(S): at 17:56

## 2017-10-17 RX ADMIN — Medication 1 MILLIGRAM(S): at 11:34

## 2017-10-17 RX ADMIN — ENOXAPARIN SODIUM 40 MILLIGRAM(S): 100 INJECTION SUBCUTANEOUS at 11:34

## 2017-10-17 RX ADMIN — GABAPENTIN 300 MILLIGRAM(S): 400 CAPSULE ORAL at 05:48

## 2017-10-17 RX ADMIN — GABAPENTIN 300 MILLIGRAM(S): 400 CAPSULE ORAL at 00:23

## 2017-10-17 RX ADMIN — Medication 1 PATCH: at 11:35

## 2017-10-17 RX ADMIN — Medication 100 MILLIGRAM(S): at 00:24

## 2017-10-17 RX ADMIN — Medication 150 MILLIGRAM(S): at 11:35

## 2017-10-17 RX ADMIN — Medication 3 MILLIGRAM(S): at 23:05

## 2017-10-17 RX ADMIN — Medication 100 MILLIGRAM(S): at 23:05

## 2017-10-17 RX ADMIN — Medication 1 PATCH: at 11:34

## 2017-10-17 RX ADMIN — HALOPERIDOL DECANOATE 2.5 MILLIGRAM(S): 100 INJECTION INTRAMUSCULAR at 22:06

## 2017-10-17 RX ADMIN — Medication 1 TABLET(S): at 11:34

## 2017-10-17 RX ADMIN — Medication 3 MILLIGRAM(S): at 00:24

## 2017-10-17 RX ADMIN — Medication 100 MILLIGRAM(S): at 11:34

## 2017-10-17 NOTE — PROGRESS NOTE ADULT - ATTENDING COMMENTS
-Patient anxious about her future once she leaves the hospital. Trying for inpatient rehab, but reportedly this has been difficult to get approved. Likely will need to DC patient to a shelter with close outpatient psych follow up. -Discussed with patient starting to think of her own personal goals/plans for what she'd like to do with her life once she's discharged. She'd like to be involved with taking care of animals if possible. She's concerned about relapse but knows she needs to stay sober. 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 Encouraged her to stay positive. Advised her to make a psychiatry appt and go to AA meetings when she's discharged.   -Iron deficiency anemia noted on labs. -Will start iron sulfate 325mg daily. -C/w MVI, folate, and thiamine daily.   -Patient reported mild abdominal discomfort, she associated with eating. Offered mylanta as needed and she said she'd let us know if she'd like something.   -Plan for DC soon.

## 2017-10-17 NOTE — PROGRESS NOTE ADULT - PROBLEM SELECTOR PLAN 3
Patient is homeless and has been to rehab multiple times without success. Patient is motivated to go to inpatient rehab but having difficulties finding placement given insurance restrictions. Patient is being rejected from multiple facilities.   - SW/CM following, appreciate recs and resources for outpatient mental health services  - awaiting response from shelter in [facility] Patient is homeless and has been to rehab multiple times without success. Patient is motivated to go to inpatient rehab but having difficulties finding placement given insurance restrictions. Patient is being rejected from multiple facilities.   - SW/CM following, appreciate recs and resources for outpatient mental health services  - awaiting response from shelter in [facility]

## 2017-10-17 NOTE — PROGRESS NOTE ADULT - ASSESSMENT
31yo woman with PMHx of alcohol use d/o and per report multiple admissions for alcohol WD c/b by hallucinations and seizures who presents with intoxication. Patient has history of multiple attempts at alcohol rehabilitation, inpatient psychiatric admissions, suicide attempts, and cutting behaviors with poor social supports and currently homeless. Patient was admitted for management of alcohol withdrawal and placed on librium taper and CIWA protocol, transitioned to ativan taper. Course c/b by oversedation from triggering of CIWA prns and transferred to MICU. Placed on phenobarbital with decrease in CIWA scores. Now transferred back to floor, holding all benzos and restarted on psych meds. Awaiting placement at rehab facility vs shelter placement.

## 2017-10-17 NOTE — PROGRESS NOTE ADULT - PROBLEM SELECTOR PLAN 1
Patient with history of depression and anxiety. Also hx of PTSD and BPD.   - continue effexor XR 150mg daily, topomax to 100mg BID today, trazadone 100mg qhs, gabapentin 300 TID  - melatonin 3mg for sleep Patient with history of depression and anxiety. Also hx of PTSD and BPD.   - continue effexor XR 150mg daily, topomax to 100mg BID today, trazadone 100mg qhs, gabapentin 300 TID  - melatonin 3mg for sleep  - will provide information for patient to make f/u appointment with outpatient psychiatry for close f/u after discharge Patient with history of depression and anxiety. Also hx of PTSD and BPD.   - continue effexor XR 150mg daily, topamax 100mg BID today, trazadone 100mg qhs, gabapentin 300 TID  - melatonin 3mg at bedtime for sleep  - will provide information for patient to make f/u appointment with outpatient psychiatry for close f/u after discharge

## 2017-10-17 NOTE — PROGRESS NOTE ADULT - PROBLEM SELECTOR PLAN 2
Patient with hx of alcohol and benzo use d/o. Treated multiple times for withdrawals c/b seizures and hallucinations. Had complicated withdrawal process in the hospital with oversedation and elevated CIWAs. Treated in the MICU with phenobarbital.   - holding all benzos currently  - gabapentin 300 TID for AUD   - haldol 2.5mg PO/IV/IM q6 prn for agitation  - phenobarbitol 16.2mg q6 prn for severe agitation  - awaiting placement at rehab facility vs shelter  - outpatient mental health services information provided  -C/w thiamine, MVI, and folate daily

## 2017-10-17 NOTE — PROGRESS NOTE ADULT - SUBJECTIVE AND OBJECTIVE BOX
Patient is a 32y old  Female who presents with a chief complaint of alcohol withdrawal (13 Oct 2017 15:40)    SUBJECTIVE / OVERNIGHT EVENTS:  No acute events overnight. Patient states that she worked with social work yesterday to try and get insurance restrictions lifted but was unable to find rehab placement. She is coming to terms with the idea of going to a shelter and following up with  and outpatient mental health services.     MEDICATIONS  (STANDING):  enoxaparin Injectable 40 milliGRAM(s) SubCutaneous daily  folic acid 1 milliGRAM(s) Oral daily  gabapentin 300 milliGRAM(s) Oral three times a day  melatonin 3 milliGRAM(s) Oral at bedtime  multivitamin 1 Tablet(s) Oral daily  nicotine - 21 mG/24Hr(s) Patch 1 patch Transdermal daily  thiamine 100 milliGRAM(s) Oral daily  topiramate 100 milliGRAM(s) Oral two times a day  traZODone 100 milliGRAM(s) Oral at bedtime  venlafaxine XR. 150 milliGRAM(s) Oral daily    MEDICATIONS  (PRN):  haloperidol     Tablet 2 milliGRAM(s) Oral three times a day PRN agitation  PHENobarbital 16.2 milliGRAM(s) Oral every 6 hours PRN agitation      REVIEW OF SYSTEMS:  CONSTITUTIONAL: Denies weakness, fevers and chills  EYES/ENT: Denies visual changes;  denies vertigo and throat pain   NECK: Denies neck pain and stiffness  RESPIRATORY: Denies shortness of breath, denies cough, wheezing, hemoptysis  CARDIOVASCULAR: Denies chest pain and palpitations  ENDOCRINE: Denies weight loss/gain. Denies cold or heat intolerance  GASTROINTESTINAL: Denies abdominal or epigastric pain. denies nausea, vomiting, and hematemesis; Denies diarrhea & constipation. Denies melena and hematochezia.  GENITOURINARY: Denies dysuria, frequency and hematuria  NEUROLOGICAL: Denies numbness and weakness  SKIN: Denies itching and rashes    T(C): 36.8 (10-17-17 @ 05:49), Max: 36.9 (10-16-17 @ 17:12)  HR: 99 (10-17-17 @ 05:49) (90 - 105)  BP: 100/62 (10-17-17 @ 05:49) (100/62 - 110/68)  RR: 18 (10-17-17 @ 05:49) (18 - 18)  SpO2: 98% (10-17-17 @ 05:49) (97% - 98%)      PHYSICAL EXAM:  GENERAL: NAD, well-developed  HEAD:  Atraumatic, Normocephalic  EYES: EOMI, PERRLA, conjunctiva and sclera clear  NECK: Supple, No JVD  CHEST/LUNG: Clear to auscultation bilaterally; No wheeze  HEART: Regular rate and rhythm; No murmurs, rubs, or gallops  ABDOMEN: Soft, Nontender, Nondistended; Bowel sounds present  EXTREMITIES:  2+ Peripheral Pulses, No clubbing, cyanosis, or edema  PSYCH: AAOx3  NEUROLOGY: non-focal, patient a little tremulous  SKIN: No rashes or lesions    CAPILLARY BLOOD GLUCOSE        I&O's Summary    16 Oct 2017 07:01  -  17 Oct 2017 07:00  --------------------------------------------------------  IN: 2220 mL / OUT: 0 mL / NET: 2220 mL    no new labs

## 2017-10-17 NOTE — PROGRESS NOTE ADULT - PROBLEM SELECTOR PLAN 4
DVT PPx: Lovenox SQ daily. -Iron deficiency anemia noted based on iron studies.   -Will start ferrous sulfate 325mg daily.

## 2017-10-18 LAB
ALBUMIN SERPL ELPH-MCNC: 4.2 G/DL — SIGNIFICANT CHANGE UP (ref 3.3–5)
ALP SERPL-CCNC: 44 U/L — SIGNIFICANT CHANGE UP (ref 40–120)
ALT FLD-CCNC: 17 U/L RC — SIGNIFICANT CHANGE UP (ref 10–45)
AMPHET UR-MCNC: NEGATIVE — SIGNIFICANT CHANGE UP
ANION GAP SERPL CALC-SCNC: 14 MMOL/L — SIGNIFICANT CHANGE UP (ref 5–17)
AST SERPL-CCNC: 25 U/L — SIGNIFICANT CHANGE UP (ref 10–40)
BARBITURATES UR SCN-MCNC: POSITIVE
BENZODIAZ UR-MCNC: NEGATIVE — SIGNIFICANT CHANGE UP
BILIRUB SERPL-MCNC: 0.2 MG/DL — SIGNIFICANT CHANGE UP (ref 0.2–1.2)
BUN SERPL-MCNC: 10 MG/DL — SIGNIFICANT CHANGE UP (ref 7–23)
CALCIUM SERPL-MCNC: 8.8 MG/DL — SIGNIFICANT CHANGE UP (ref 8.4–10.5)
CHLORIDE SERPL-SCNC: 102 MMOL/L — SIGNIFICANT CHANGE UP (ref 96–108)
CO2 SERPL-SCNC: 22 MMOL/L — SIGNIFICANT CHANGE UP (ref 22–31)
COCAINE METAB.OTHER UR-MCNC: NEGATIVE — SIGNIFICANT CHANGE UP
CREAT SERPL-MCNC: 0.76 MG/DL — SIGNIFICANT CHANGE UP (ref 0.5–1.3)
ETHANOL SERPL-MCNC: SIGNIFICANT CHANGE UP MG/DL (ref 0–10)
GLUCOSE SERPL-MCNC: 86 MG/DL — SIGNIFICANT CHANGE UP (ref 70–99)
HCT VFR BLD CALC: 31.4 % — LOW (ref 34.5–45)
HGB BLD-MCNC: 10.1 G/DL — LOW (ref 11.5–15.5)
MAGNESIUM SERPL-MCNC: 1.9 MG/DL — SIGNIFICANT CHANGE UP (ref 1.6–2.6)
MCHC RBC-ENTMCNC: 27.7 PG — SIGNIFICANT CHANGE UP (ref 27–34)
MCHC RBC-ENTMCNC: 32.2 GM/DL — SIGNIFICANT CHANGE UP (ref 32–36)
MCV RBC AUTO: 86.2 FL — SIGNIFICANT CHANGE UP (ref 80–100)
METHADONE UR-MCNC: NEGATIVE — SIGNIFICANT CHANGE UP
OPIATES UR-MCNC: NEGATIVE — SIGNIFICANT CHANGE UP
OXYCODONE UR-MCNC: NEGATIVE — SIGNIFICANT CHANGE UP
PCP SPEC-MCNC: SIGNIFICANT CHANGE UP
PCP UR-MCNC: NEGATIVE — SIGNIFICANT CHANGE UP
PHOSPHATE SERPL-MCNC: 3.8 MG/DL — SIGNIFICANT CHANGE UP (ref 2.5–4.5)
PLATELET # BLD AUTO: 141 K/UL — LOW (ref 150–400)
POTASSIUM SERPL-MCNC: 3.6 MMOL/L — SIGNIFICANT CHANGE UP (ref 3.5–5.3)
POTASSIUM SERPL-SCNC: 3.6 MMOL/L — SIGNIFICANT CHANGE UP (ref 3.5–5.3)
PROT SERPL-MCNC: 7.2 G/DL — SIGNIFICANT CHANGE UP (ref 6–8.3)
RBC # BLD: 3.64 M/UL — LOW (ref 3.8–5.2)
RBC # FLD: 19.6 % — HIGH (ref 10.3–14.5)
SODIUM SERPL-SCNC: 138 MMOL/L — SIGNIFICANT CHANGE UP (ref 135–145)
THC UR QL: NEGATIVE — SIGNIFICANT CHANGE UP
WBC # BLD: 10.1 K/UL — SIGNIFICANT CHANGE UP (ref 3.8–10.5)
WBC # FLD AUTO: 10.1 K/UL — SIGNIFICANT CHANGE UP (ref 3.8–10.5)

## 2017-10-18 PROCEDURE — 99233 SBSQ HOSP IP/OBS HIGH 50: CPT

## 2017-10-18 PROCEDURE — 93010 ELECTROCARDIOGRAM REPORT: CPT

## 2017-10-18 PROCEDURE — 99232 SBSQ HOSP IP/OBS MODERATE 35: CPT | Mod: GC

## 2017-10-18 RX ORDER — HYDROXYZINE HCL 10 MG
25 TABLET ORAL EVERY 6 HOURS
Qty: 0 | Refills: 0 | Status: DISCONTINUED | OUTPATIENT
Start: 2017-10-18 | End: 2017-10-19

## 2017-10-18 RX ORDER — HALOPERIDOL DECANOATE 100 MG/ML
2.5 INJECTION INTRAMUSCULAR ONCE
Qty: 0 | Refills: 0 | Status: COMPLETED | OUTPATIENT
Start: 2017-10-18 | End: 2017-10-18

## 2017-10-18 RX ORDER — HALOPERIDOL DECANOATE 100 MG/ML
2.5 INJECTION INTRAMUSCULAR ONCE
Qty: 0 | Refills: 0 | Status: COMPLETED | OUTPATIENT
Start: 2017-10-18 | End: 2018-09-16

## 2017-10-18 RX ADMIN — Medication 1 MILLIGRAM(S): at 11:50

## 2017-10-18 RX ADMIN — Medication 1 TABLET(S): at 11:48

## 2017-10-18 RX ADMIN — HALOPERIDOL DECANOATE 2 MILLIGRAM(S): 100 INJECTION INTRAMUSCULAR at 12:31

## 2017-10-18 RX ADMIN — Medication 100 MILLIGRAM(S): at 11:49

## 2017-10-18 RX ADMIN — HALOPERIDOL DECANOATE 2.5 MILLIGRAM(S): 100 INJECTION INTRAMUSCULAR at 05:34

## 2017-10-18 RX ADMIN — Medication 1 PATCH: at 11:49

## 2017-10-18 RX ADMIN — Medication 150 MILLIGRAM(S): at 11:49

## 2017-10-18 RX ADMIN — Medication 3 MILLIGRAM(S): at 21:57

## 2017-10-18 RX ADMIN — HALOPERIDOL DECANOATE 2.5 MILLIGRAM(S): 100 INJECTION INTRAMUSCULAR at 14:57

## 2017-10-18 RX ADMIN — Medication 7.5 MILLIGRAM(S): at 14:34

## 2017-10-18 RX ADMIN — GABAPENTIN 300 MILLIGRAM(S): 400 CAPSULE ORAL at 13:25

## 2017-10-18 RX ADMIN — GABAPENTIN 300 MILLIGRAM(S): 400 CAPSULE ORAL at 21:57

## 2017-10-18 RX ADMIN — Medication 325 MILLIGRAM(S): at 11:50

## 2017-10-18 RX ADMIN — Medication 100 MILLIGRAM(S): at 21:57

## 2017-10-18 RX ADMIN — GABAPENTIN 300 MILLIGRAM(S): 400 CAPSULE ORAL at 05:34

## 2017-10-18 RX ADMIN — ENOXAPARIN SODIUM 40 MILLIGRAM(S): 100 INJECTION SUBCUTANEOUS at 11:50

## 2017-10-18 RX ADMIN — Medication 100 MILLIGRAM(S): at 18:57

## 2017-10-18 RX ADMIN — Medication 7.5 MILLIGRAM(S): at 18:57

## 2017-10-18 RX ADMIN — Medication 100 MILLIGRAM(S): at 05:34

## 2017-10-18 NOTE — PROVIDER CONTACT NOTE (MEDICATION) - SITUATION
patient was eating her lunch and took knife from her tray and tried to cut her left wrist,  constant observation maintained.

## 2017-10-18 NOTE — PROVIDER CONTACT NOTE (OTHER) - NAME OF MD/NP/PA/DO NOTIFIED:
Dr. Josué Grande
Dr. Renetta Inman
MD Kyle, MD Lorenzo
MD Malinda
MD Pearce
MD Portillo
MD Rooney
MD Rooney
MD Satish
MD Satish
MD Ventura
ZULLY ESPINO
Team 2, MD Grande
MD Pearce

## 2017-10-18 NOTE — PROGRESS NOTE ADULT - PROBLEM SELECTOR PLAN 3
Patient is homeless and has been to rehab multiple times without success. Patient is motivated to go to inpatient rehab but having difficulties finding placement given insurance restrictions. Patient is being rejected from multiple facilities.   - SW/CM following, appreciate recs Patient is homeless and has been to rehab multiple times without success. Patient is motivated to go to inpatient rehab but having difficulties finding placement given insurance restrictions. Patient is being rejected from multiple facilities.   - SW/ROS following, appreciate recs  - patient has been rejected from inpatient alcohol rehab as well as shelter placement bc she receives SSD insurance

## 2017-10-18 NOTE — PROGRESS NOTE BEHAVIORAL HEALTH - NSBHCONSULTMEDS_PSY_A_CORE FT
1) Please continue Topamax 100mg PO BID    2) Continue Effexor XR 150mg PO daily and Gabapentin 300mg PO TID.  3) Continue Trazodone 100mg PO QHS  4) PRN: Atarax 50mg PO q6h PRN anxiety/agitation  5) [facility] psych will f/u in AM

## 2017-10-18 NOTE — PROGRESS NOTE ADULT - ASSESSMENT
31yo woman with PMHx of alcohol use d/o and per report multiple admissions for alcohol WD c/b by hallucinations and seizures who presents with intoxication. Patient has history of multiple attempts at alcohol rehabilitation, inpatient psychiatric admissions, suicide attempts, and cutting behaviors with poor social supports and currently homeless. Patient was admitted for management of alcohol withdrawal and placed on librium taper and CIWA protocol, transitioned to ativan taper. Course c/b by oversedation from triggering of CIWA prns and transferred to MICU. Placed on phenobarbital with decrease in CIWA scores. Now transferred back to floor, holding all benzos and restarted on psych meds. Course further complicated by patient reports drinking hand  and cleaning supplies, now stating she is in withdrawals again.

## 2017-10-18 NOTE — PROVIDER CONTACT NOTE (OTHER) - DATE AND TIME:
07-Oct-2017 00:15
17-Oct-2017 20:00
06-Oct-2017 02:00
06-Oct-2017 08:17
06-Oct-2017 12:15
06-Oct-2017 16:30
06-Oct-2017 18:35
07-Oct-2017 02:10
07-Oct-2017 09:25
07-Oct-2017 09:54
07-Oct-2017 15:30
07-Oct-2017 19:50
08-Oct-2017 00:00
08-Oct-2017 00:40
08-Oct-2017 20:00
09-Oct-2017 00:00
09-Oct-2017 01:15
13-Oct-2017 22:46

## 2017-10-18 NOTE — PROGRESS NOTE BEHAVIORAL HEALTH - NSBHFUPINTERVALHXFT_PSY_A_CORE
Primary team requested f/u for concerns for self-harm.  Pt cut herself with plastic knife today to "feel better" per pt; also states she licked hand  foam for the same reason.  States she has been feeling overwhelmed and anxious, worried about relapsing on ETOH.  Also states she has chronic SI to cut herself deeply and bleed to death, the last time she made a lac requiring suture was 2 mos ago and resulted in a psych admission.  Has been requesting Haldol PRNs.

## 2017-10-18 NOTE — PROGRESS NOTE ADULT - SUBJECTIVE AND OBJECTIVE BOX
Patient is a 32y old  Female who presents with a chief complaint of alcohol withdrawal (13 Oct 2017 15:40)    SUBJECTIVE / OVERNIGHT EVENTS:  Patient became agitated last night, was caught drinking hand  and screaming and demanding ativan for treatment of withdrawals. Patient received haldol 2.5mg IV x2 and was placed on 1:1. This AM, patient admits that ever since 1:1 was discontinued, starting on Saturday patient has been drinking hand  and cleaning fluid. She reports that she would ask cleaning staff to replace used containers and now thinks she is withdrawing. Patient requested ativan. Explained to patient that this is an unfortunate setback in her hospital course, but team is fearful that this behavior is evidence she is not ready for less restrictive environments such as a shelter. Will obtain labs today to assess patient's BAL, toxicity, and LFTs. Patient states that she would like to voluntarily admit herself to psychiatric unit. Will keep patient on 1:1 for now and will have psychiatry come back and see her.    MEDICATIONS  (STANDING):  enoxaparin Injectable 40 milliGRAM(s) SubCutaneous daily  ferrous    sulfate 325 milliGRAM(s) Oral daily  folic acid 1 milliGRAM(s) Oral daily  gabapentin 300 milliGRAM(s) Oral three times a day  melatonin 3 milliGRAM(s) Oral at bedtime  multivitamin 1 Tablet(s) Oral daily  nicotine - 21 mG/24Hr(s) Patch 1 patch Transdermal daily  thiamine 100 milliGRAM(s) Oral daily  topiramate 100 milliGRAM(s) Oral two times a day  traZODone 100 milliGRAM(s) Oral at bedtime  venlafaxine XR. 150 milliGRAM(s) Oral daily    MEDICATIONS  (PRN):  haloperidol     Tablet 2 milliGRAM(s) Oral three times a day PRN agitation  PHENobarbital 16.2 milliGRAM(s) Oral every 6 hours PRN agitation      REVIEW OF SYSTEMS:  CONSTITUTIONAL: Denies weakness, fevers and chills  EYES/ENT: Denies visual changes;  denies vertigo and throat pain   NECK: Denies neck pain and stiffness  RESPIRATORY: Denies shortness of breath, denies cough, wheezing, hemoptysis  CARDIOVASCULAR: Denies chest pain and palpitations  ENDOCRINE: Denies weight loss/gain. Denies cold or heat intolerance  GASTROINTESTINAL: Denies abdominal or epigastric pain. denies nausea, vomiting, and hematemesis; Denies diarrhea & constipation. Denies melena and hematochezia.  GENITOURINARY: Denies dysuria, frequency and hematuria  NEUROLOGICAL: Denies numbness and weakness  SKIN: Denies itching and rashes    T(C): 36.9 (10-18-17 @ 05:30), Max: 36.9 (10-18-17 @ 05:30)  HR: 88 (10-18-17 @ 05:30) (88 - 102)  BP: 112/76 (10-18-17 @ 05:30) (104/68 - 112/76)  RR: 16 (10-18-17 @ 05:30) (16 - 18)  SpO2: 97% (10-18-17 @ 05:30) (97% - 97%)      PHYSICAL EXAM:  GENERAL: restless  HEAD:  Atraumatic, Normocephalic  EYES: EOMI, PERRLA, conjunctiva and sclera clear  NECK: Supple, No JVD  CHEST/LUNG: Clear to auscultation bilaterally; No wheeze  HEART: Regular rate and rhythm; No murmurs, rubs, or gallops  ABDOMEN: Soft, Nontender, Nondistended; Bowel sounds present  EXTREMITIES:  2+ Peripheral Pulses, No clubbing, cyanosis, or edema  PSYCH: AAOx3  NEUROLOGY: non-focal  SKIN: No rashes or lesions    CAPILLARY BLOOD GLUCOSE        I&O's Summary    17 Oct 2017 07:01  -  18 Oct 2017 07:00  --------------------------------------------------------  IN: 1440 mL / OUT: 0 mL / NET: 1440 mL        LABS Patient is a 32y old  Female who presents with a chief complaint of alcohol withdrawal (13 Oct 2017 15:40)    SUBJECTIVE / OVERNIGHT EVENTS:  Patient became agitated last night, was caught drinking hand  and screaming and demanding ativan for treatment of withdrawals. Patient received haldol 2.5mg IV x2 and was placed on 1:1. This AM, patient admits that ever since 1:1 was discontinued, starting on Saturday patient has been drinking hand  and cleaning fluid. She reports that she would ask cleaning staff to replace used containers and now thinks she is withdrawing. Patient requested ativan. Explained to patient that this is an unfortunate setback in her hospital course, but team is fearful that this behavior is evidence she is not ready for less restrictive environments such as a shelter. Will obtain labs today to assess patient's BAL, toxicity, and LFTs. Patient states that she would like to voluntarily admit herself to psychiatric unit. Will keep patient on 1:1 for now and will have psychiatry come back and see her.    MEDICATIONS  (STANDING):  enoxaparin Injectable 40 milliGRAM(s) SubCutaneous daily  ferrous    sulfate 325 milliGRAM(s) Oral daily  folic acid 1 milliGRAM(s) Oral daily  gabapentin 300 milliGRAM(s) Oral three times a day  melatonin 3 milliGRAM(s) Oral at bedtime  multivitamin 1 Tablet(s) Oral daily  nicotine - 21 mG/24Hr(s) Patch 1 patch Transdermal daily  thiamine 100 milliGRAM(s) Oral daily  topiramate 100 milliGRAM(s) Oral two times a day  traZODone 100 milliGRAM(s) Oral at bedtime  venlafaxine XR. 150 milliGRAM(s) Oral daily    MEDICATIONS  (PRN):  haloperidol     Tablet 2 milliGRAM(s) Oral three times a day PRN agitation  PHENobarbital 16.2 milliGRAM(s) Oral every 6 hours PRN agitation      REVIEW OF SYSTEMS:  CONSTITUTIONAL: Denies weakness, fevers and chills  EYES/ENT: Denies visual changes;  denies vertigo and throat pain   NECK: Denies neck pain and stiffness  RESPIRATORY: Denies shortness of breath, denies cough, wheezing, hemoptysis  CARDIOVASCULAR: Denies chest pain and palpitations  ENDOCRINE: Denies weight loss/gain. Denies cold or heat intolerance  GASTROINTESTINAL: Denies abdominal or epigastric pain. denies nausea, vomiting, and hematemesis; Denies diarrhea & constipation. Denies melena and hematochezia.  GENITOURINARY: Denies dysuria, frequency and hematuria  NEUROLOGICAL: Denies numbness and weakness  SKIN: Denies itching and rashes    T(C): 36.9 (10-18-17 @ 05:30), Max: 36.9 (10-18-17 @ 05:30)  HR: 88 (10-18-17 @ 05:30) (88 - 102)  BP: 112/76 (10-18-17 @ 05:30) (104/68 - 112/76)  RR: 16 (10-18-17 @ 05:30) (16 - 18)  SpO2: 97% (10-18-17 @ 05:30) (97% - 97%)      PHYSICAL EXAM:  GENERAL: restless  HEAD:  Atraumatic, Normocephalic  EYES: EOMI, PERRLA, conjunctiva and sclera clear  NECK: Supple, No JVD  CHEST/LUNG: Clear to auscultation bilaterally; No wheeze  HEART: Regular rate and rhythm; No murmurs, rubs, or gallops  ABDOMEN: Soft, Nontender, Nondistended; Bowel sounds present  EXTREMITIES:  2+ Peripheral Pulses, No clubbing, cyanosis, or edema  PSYCH: AAOx3  NEUROLOGY: non-focal, tremors  SKIN: No rashes or lesions    CAPILLARY BLOOD GLUCOSE        I&O's Summary    17 Oct 2017 07:01  -  18 Oct 2017 07:00  --------------------------------------------------------  IN: 1440 mL / OUT: 0 mL / NET: 1440 mL        LABS

## 2017-10-18 NOTE — PROGRESS NOTE ADULT - ATTENDING COMMENTS
-Patient reports she was drinking hand , ?2 containers per day. The patient says she feels shaky like she's going through withdrawal. I doubt that she consumed enough to be going through withdrawal. -Told her I wouldn't start her on benzos, but will give a trial of buspirone for anxiety.   -Patient expressed to me that she "has thoughts of hurting herself and thinks she'd be better off dead". She reports that she has recently thought about suicide. She also reportedly tried to cut herself with a plastic utensil earlier. She considers trying to consume the hand  as a form of self-harm.   -In view of all of this, in my opinion the patient should be admitted to inpatient psychiatry for suicidal ideations and self-destructive behavior. At the very least an inpatient substance abuse rehab would be indicated. The patient would be a harm to herself and potentially others if she is discharged to outpatient. The patient agrees and would be willing to sign voluntarily into an inpatient psych unit.

## 2017-10-18 NOTE — PROGRESS NOTE ADULT - PROBLEM SELECTOR PLAN 1
Patient with hx of alcohol and benzo use d/o. Treated multiple times for withdrawals c/b seizures and hallucinations. Had complicated withdrawal process in the hospital with oversedation and elevated CIWAs. Treated in the MICU with phenobarbital. Now reports drinking hand  and cleaning supplies.   - f/u CBC, CMP, BAL, Utox  - consider need for restarting CIWA, although clinically patient does not appear to be in withdrawals and likely is seeking ativan  - vitals are stable  - will have psych come reassess, made appointment for patient to go to substance use program as outpatient but may require inpatient psychiatric stabilization and requesting voluntary admission  - holding all benzos currently  - gabapentin 300 TID for AUD   - haldol 2.5mg PO/IV/IM q6 prn for agitation  - phenobarbitol 16.2mg q6 prn for severe agitation  - C/w thiamine, MVI, and folate daily Patient with hx of alcohol and benzo use d/o. Treated multiple times for withdrawals c/b seizures and hallucinations. Had complicated withdrawal process in the hospital with oversedation and elevated CIWAs. Treated in the MICU with phenobarbital. Now reports drinking hand  and cleaning supplies.   - BAL negative, no LFT abnormalities  - low need for restarting CIWA, clinically patient does not appear to be in withdrawals and likely is seeking ativan  - vitals are stable  - will have psych come reassess, made appointment for patient to go to substance use program as outpatient but may require inpatient psychiatric stabilization and requesting voluntary admission  - holding all benzos currently  - gabapentin 300 TID for AUD   - haldol 2.5mg PO/IV/IM q6 prn for agitation  - phenobarbitol 16.2mg q6 prn for severe agitation  - C/w thiamine, MVI, and folate daily

## 2017-10-18 NOTE — PROGRESS NOTE BEHAVIORAL HEALTH - SUMMARY
This is a 32-y.o. HF patient with PPHx of MDD without psychotic features, Borderline personality disorder, Alcohol use disorder, admitted after pt called 911 seeking help for withdrawal symptoms of alcohol, BAL (365) transferred to MICU for possible withdrawal symptoms. Pt has a history of impulsivity and self injurious behavior and is at risk of self harm if needs not met while in the hospital. Today cut herself superficially with a plastic knife, drank hand , states she did these things to hurt herself which makes her feel better.  Also with chronic SI to make deep cuts and bleed to death, last deep cut 2 mos ago.

## 2017-10-18 NOTE — PROGRESS NOTE ADULT - PROBLEM SELECTOR PLAN 2
Patient with history of depression and anxiety. Also hx of PTSD and BPD.   - continue effexor XR 150mg daily, topamax 100mg BID today, trazadone 100mg qhs, gabapentin 300 TID  - melatonin 3mg at bedtime for sleep  - patient has appointment with outpatient psychiatry in substance use program, may need inpatient treatment Patient with history of depression and anxiety. Also hx of PTSD and BPD.   - continue effexor XR 150mg daily, topamax 100mg BID today, trazadone 100mg qhs, gabapentin 300 TID  - started on buspirone 7.5mg q12 for anxiety  - melatonin 3mg at bedtime for sleep  - patient has appointment with outpatient psychiatry in substance use program, may need inpatient treatment Patient with history of depression and anxiety. Also hx of PTSD and BPD.   - continue effexor XR 150mg daily, topamax 100mg BID today, trazadone 100mg qhs, gabapentin 300 TID  - started on buspirone 7.5mg q12H for anxiety  - melatonin 3mg at bedtime for sleep  - patient has appointment with outpatient psychiatry in substance use program, may need inpatient treatment

## 2017-10-18 NOTE — PROGRESS NOTE ADULT - PROBLEM SELECTOR PLAN 4
- Iron deficiency anemia noted based on iron studies.   - Will start ferrous sulfate 325mg daily. - Iron deficiency anemia noted based on iron studies.   - continue ferrous sulfate 325mg daily.

## 2017-10-19 ENCOUNTER — INPATIENT (INPATIENT)
Facility: HOSPITAL | Age: 32
LOS: 7 days | Discharge: HOME | End: 2017-10-27
Attending: PSYCHIATRY & NEUROLOGY | Admitting: PSYCHIATRY & NEUROLOGY
Payer: MEDICAID

## 2017-10-19 VITALS
TEMPERATURE: 99 F | HEART RATE: 71 BPM | DIASTOLIC BLOOD PRESSURE: 73 MMHG | SYSTOLIC BLOOD PRESSURE: 118 MMHG | OXYGEN SATURATION: 99 % | RESPIRATION RATE: 18 BRPM

## 2017-10-19 DIAGNOSIS — F33.0 MAJOR DEPRESSIVE DISORDER, RECURRENT, MILD: ICD-10-CM

## 2017-10-19 PROCEDURE — 99233 SBSQ HOSP IP/OBS HIGH 50: CPT | Mod: GC

## 2017-10-19 PROCEDURE — 99239 HOSP IP/OBS DSCHRG MGMT >30: CPT

## 2017-10-19 RX ORDER — FERROUS SULFATE 325(65) MG
1 TABLET ORAL
Qty: 0 | Refills: 0 | COMMUNITY
Start: 2017-10-19

## 2017-10-19 RX ORDER — ACETAMINOPHEN 500 MG
650 TABLET ORAL ONCE
Qty: 0 | Refills: 0 | Status: COMPLETED | OUTPATIENT
Start: 2017-10-19 | End: 2017-10-19

## 2017-10-19 RX ORDER — HYDROXYZINE HCL 10 MG
1 TABLET ORAL
Qty: 0 | Refills: 0 | COMMUNITY
Start: 2017-10-19

## 2017-10-19 RX ORDER — NICOTINE POLACRILEX 2 MG
0 GUM BUCCAL
Qty: 0 | Refills: 0 | COMMUNITY
Start: 2017-10-19

## 2017-10-19 RX ORDER — LANOLIN ALCOHOL/MO/W.PET/CERES
1 CREAM (GRAM) TOPICAL
Qty: 0 | Refills: 0 | COMMUNITY
Start: 2017-10-19

## 2017-10-19 RX ADMIN — HALOPERIDOL DECANOATE 2 MILLIGRAM(S): 100 INJECTION INTRAMUSCULAR at 16:35

## 2017-10-19 RX ADMIN — Medication 25 MILLIGRAM(S): at 21:32

## 2017-10-19 RX ADMIN — Medication 25 MILLIGRAM(S): at 00:39

## 2017-10-19 RX ADMIN — GABAPENTIN 300 MILLIGRAM(S): 400 CAPSULE ORAL at 06:02

## 2017-10-19 RX ADMIN — Medication 325 MILLIGRAM(S): at 11:29

## 2017-10-19 RX ADMIN — GABAPENTIN 300 MILLIGRAM(S): 400 CAPSULE ORAL at 21:32

## 2017-10-19 RX ADMIN — Medication 25 MILLIGRAM(S): at 13:15

## 2017-10-19 RX ADMIN — Medication 1 MILLIGRAM(S): at 11:29

## 2017-10-19 RX ADMIN — Medication 100 MILLIGRAM(S): at 06:02

## 2017-10-19 RX ADMIN — Medication 1 PATCH: at 11:25

## 2017-10-19 RX ADMIN — Medication 650 MILLIGRAM(S): at 06:43

## 2017-10-19 RX ADMIN — HALOPERIDOL DECANOATE 2 MILLIGRAM(S): 100 INJECTION INTRAMUSCULAR at 03:22

## 2017-10-19 RX ADMIN — Medication 100 MILLIGRAM(S): at 16:35

## 2017-10-19 RX ADMIN — Medication 100 MILLIGRAM(S): at 11:28

## 2017-10-19 RX ADMIN — Medication 650 MILLIGRAM(S): at 07:43

## 2017-10-19 RX ADMIN — GABAPENTIN 300 MILLIGRAM(S): 400 CAPSULE ORAL at 13:15

## 2017-10-19 RX ADMIN — Medication 150 MILLIGRAM(S): at 11:27

## 2017-10-19 RX ADMIN — Medication 1 PATCH: at 11:27

## 2017-10-19 RX ADMIN — Medication 7.5 MILLIGRAM(S): at 06:02

## 2017-10-19 RX ADMIN — HALOPERIDOL DECANOATE 2 MILLIGRAM(S): 100 INJECTION INTRAMUSCULAR at 11:28

## 2017-10-19 RX ADMIN — ENOXAPARIN SODIUM 40 MILLIGRAM(S): 100 INJECTION SUBCUTANEOUS at 11:27

## 2017-10-19 RX ADMIN — Medication 650 MILLIGRAM(S): at 14:07

## 2017-10-19 RX ADMIN — Medication 25 MILLIGRAM(S): at 07:54

## 2017-10-19 RX ADMIN — Medication 650 MILLIGRAM(S): at 14:37

## 2017-10-19 RX ADMIN — Medication 1 TABLET(S): at 11:28

## 2017-10-19 RX ADMIN — Medication 7.5 MILLIGRAM(S): at 16:35

## 2017-10-19 NOTE — PROGRESS NOTE BEHAVIORAL HEALTH - PERCEPTIONS
No abnormalities
No abnormalities
Other
No abnormalities

## 2017-10-19 NOTE — PROGRESS NOTE BEHAVIORAL HEALTH - NS ED [facility] MED ROS CONSTITUTIONAL SYMPTOMS
No complaints
Yes
No complaints
No complaints

## 2017-10-19 NOTE — PROGRESS NOTE ADULT - PROBLEM SELECTOR PROBLEM 3
Social problem

## 2017-10-19 NOTE — PROGRESS NOTE ADULT - PROBLEM SELECTOR PLAN 1
Patient with hx of alcohol and benzo use d/o. Treated multiple times for withdrawals c/b seizures and hallucinations. Had complicated withdrawal process in the hospital with oversedation and elevated CIWAs. Treated in the MICU with phenobarbital. Now reports drinking hand  and cleaning supplies.   - BAL negative, no LFT abnormalities  - low need for restarting CIWA, clinically patient does not appear to be in withdrawals and likely is seeking ativan  - vitals are stable  - will have psych come reassess, made appointment for patient to go to substance use program as outpatient but may require inpatient psychiatric stabilization and requesting voluntary admission  - holding all benzos currently  - gabapentin 300 TID for AUD   - haldol 2.5mg PO/IV/IM q6 prn for agitation  - phenobarbitol 16.2mg q6 prn for severe agitation  - C/w thiamine, MVI, and folate daily Patient with hx of alcohol and benzo use d/o as well as significant psychiatric history. Treated multiple times for withdrawals c/b seizures and hallucinations. Had complicated withdrawal process in the hospital with oversedation and elevated CIWAs. Treated in the MICU with phenobarbital. Now reports drinking hand  and cleaning supplies. Clinically patient does not appear in WD. BAL negative, no LFT abnormalities.  - Psych re-evaluated patient and given current presentation w/ chronic SI, high chance of relapsing for alcohol, and poor plan for disposition as she has been rejected from all inpatient rehabs and shelters, may benefit from inpatient psychiatric admission for stabilization and planning but ultimately needs rehabilitation for alcohol use disorder as well as DBT for BPD as an outpatient and patient has been poor to follow up in past  - can continue gabapentin 300 TID    - haldol 2.5mg PO/IV/IM q6 prn for agitation  - C/w thiamine, MVI, and folate daily

## 2017-10-19 NOTE — PROGRESS NOTE BEHAVIORAL HEALTH - NS ED [facility] MED ROS NEUROLOGICAL
Unable to assess
No complaints

## 2017-10-19 NOTE — PROGRESS NOTE BEHAVIORAL HEALTH - CASE SUMMARY
32-y.o. HF patient with PPHx of MDD without psychotic features, Borderline personality disorder, Alcohol use disorder, admitted after pt called 911 seeking help for withdrawal symptoms of alcohol, BAL (365) transferred to MICU for possible withdrawal symptoms. Pt endorsing worsening depression, active SI to cut herself deeply, severe anxiety.  Haldol and Atarax PRNs given ON.  Pt has engaged in self harm behaviors in hospital (cutting wrist with plastic knife, drinking hand ).  Requires psych admission for safety and stabilization.  1. 9.13 to inpt psychiatry.  2. c/w current medications.  3. PRN: Atarax 50mg POq 6h PRN agitation.
This is a 32-y.o. HF pt. with PPHx of MDD without psychotic features, borderline personality disorder, Alcohol use disorder, admitted after calling 911 seeking help for withdrawal symptoms of alcohol, BAL (365) transferred to MICU for possible withdrawal symptoms. Pt is not actively suicidal or homicidal, but does have a history of impulsivity and self injurious behavior and is at risk of self harm if needs not met while in the hospital.    I have seen and evaluated this patient myself Chart, labs, meds reviewed. I agree with resident's assessment and plan.
This is a 32-y.o. HF pt. with PPHx of MDD without psychotic features, borderline personality disorder, Alcohol use disorder, admitted after calling 911 seeking help for withdrawal symptoms of alcohol, BAL (365) transferred to MICU for possible withdrawal symptoms. Pt is not actively suicidal or homicidal, but does have a history of impulsivity and self injurious behavior and is at risk of self harm if needs not met while in the hospital.    I have seen and evaluated this patient myself on 10/13 with Dr. Quevedo. Chart, labs, meds reviewed. I agree with fellow's assessment and plan.
31 y/o Female with PPHx of MDD without psychotic features, Borderline personality disorder, Alcohol use disorder, admitted after pt called 911 seeking help for withdrawal symptoms of alcohol, BAL (365) transferred to MICU for possible withdrawal symptoms. Pt is not actively suicidal or homicidal, but does have a history of impulsivity and self injurious behavior and is at risk of self harm if needs not met while in the hospital.  1) Please initiate Topamax 50mg PO BID and Effexor XR 75mg PO daily, give first dose now.; 2) Pt is agreeable to enter in-pt rehab and social work to coordinate this effort; 3) Continue 1:1 for impulsivity and mood lability; 4) c/w CIWA and Phenobarb PRN as per MICU team  5) would benefit from inpatient rehab

## 2017-10-19 NOTE — PROGRESS NOTE BEHAVIORAL HEALTH - SUMMARY
This is a 32-y.o. HF patient with PPHx of MDD without psychotic features, Borderline personality disorder, Alcohol use disorder, admitted after pt called 911 seeking help for withdrawal symptoms of alcohol, BAL (365) transferred to MICU for possible withdrawal symptoms. Pt has a history of impulsivity and self injurious behavior and is at risk of self harm if needs not met while in the hospital. Today cut herself superficially with a plastic knife, drank hand , states she did these things to hurt herself which makes her feel better.  Also with chronic SI to make deep cuts and bleed to death, last deep cut 2 mos ago. Although her risk factors are chronic, during current hospitalization she has escalated, has been impulsive, continues to feel SI and has acted upon these thoughts. She is agreeable to voluntary psychiatric hospitalization.

## 2017-10-19 NOTE — PROGRESS NOTE BEHAVIORAL HEALTH - LANGUAGE
No abnormalities noted
No abnormalities noted
Other
No abnormalities noted

## 2017-10-19 NOTE — PROGRESS NOTE BEHAVIORAL HEALTH - MUSCLE TONE / STRENGTH
Normal muscle tone/strength

## 2017-10-19 NOTE — PROGRESS NOTE BEHAVIORAL HEALTH - NSBHCONSULTMEDS_PSY_A_CORE FT
1) Please continue Topamax 100mg PO BID    2) Continue Effexor XR 150mg PO daily and Gabapentin 300mg PO TID.  3) Continue Trazodone 100mg PO QHS  4) PRN: Atarax 50mg PO q6h PRN anxiety/agitation

## 2017-10-19 NOTE — PROGRESS NOTE ADULT - ATTENDING COMMENTS
-Patient felt better today after medication adjustments.   -Discussed with psychiatry team. Patient medically stable for DC, but have been having difficulty placing her in inpatient rehab and shelters, without a safe DC plan. Patient has expressed and exhibited self-harm behavior in addition to substance abuse. Fluctuating mood with acute on chronic suicidal ideation, concern for DC to community without safe DC plan.   -Appreciate psychiatry taking patient for inpatient psychiatric admission, where I believe she is better served for her condition. -Patient felt better today after medication adjustments.   -Discussed with psychiatry team. Patient medically stable for DC, but have been having difficulty placing her in inpatient rehab and shelters, without a safe DC plan. Patient has expressed and exhibited self-harm behavior in addition to substance abuse. Fluctuating mood with acute on chronic suicidal ideation, concern for DC to community without safe DC plan.   -Appreciate psychiatry taking patient for inpatient psychiatric admission, where I believe she is better served for her condition.  -DC to inpatient psych today.

## 2017-10-19 NOTE — PROGRESS NOTE BEHAVIORAL HEALTH - NSBHCONSULTMEDAGITATION_PSY_A_CORE FT
Haldol 5mg PO/IV/IM Q6 PRN
Haldol 2mg PO/IV/IM Q6 PRN Severe agitation
Haldol 5mg PO/IV/IM Q6 PRN
Haldol 5mg PO/IV/IM Q6 PRN
Haldol 2mg PO/IV/IM Q6 PRN Severe agitation

## 2017-10-19 NOTE — PROGRESS NOTE ADULT - SUBJECTIVE AND OBJECTIVE BOX
Patient is a 32y old  Female who presents with a chief complaint of alcohol withdrawal (13 Oct 2017 15:40)      SUBJECTIVE / OVERNIGHT EVENTS:    MEDICATIONS  (STANDING):  busPIRone 7.5 milliGRAM(s) Oral every 12 hours  enoxaparin Injectable 40 milliGRAM(s) SubCutaneous daily  ferrous    sulfate 325 milliGRAM(s) Oral daily  folic acid 1 milliGRAM(s) Oral daily  gabapentin 300 milliGRAM(s) Oral three times a day  melatonin 3 milliGRAM(s) Oral at bedtime  multivitamin 1 Tablet(s) Oral daily  nicotine - 21 mG/24Hr(s) Patch 1 patch Transdermal daily  thiamine 100 milliGRAM(s) Oral daily  topiramate 100 milliGRAM(s) Oral two times a day  traZODone 100 milliGRAM(s) Oral at bedtime  venlafaxine XR. 150 milliGRAM(s) Oral daily    MEDICATIONS  (PRN):  haloperidol     Tablet 2 milliGRAM(s) Oral three times a day PRN agitation  hydrOXYzine hydrochloride 25 milliGRAM(s) Oral every 6 hours PRN Agitation/anxiety      REVIEW OF SYSTEMS:  CONSTITUTIONAL: Denies weakness, fevers and chills  EYES/ENT: Denies visual changes;  denies vertigo and throat pain   NECK: Denies neck pain and stiffness  RESPIRATORY: Denies shortness of breath, denies cough, wheezing, hemoptysis  CARDIOVASCULAR: Denies chest pain and palpitations  ENDOCRINE: Denies weight loss/gain. Denies cold or heat intolerance  GASTROINTESTINAL: Denies abdominal or epigastric pain. denies nausea, vomiting, and hematemesis; Denies diarrhea & constipation. Denies melena and hematochezia.  GENITOURINARY: Denies dysuria, frequency and hematuria  NEUROLOGICAL: Denies numbness and weakness  SKIN: Denies itching and rashes    T(C): 37 (10-19-17 @ 04:05), Max: 37 (10-19-17 @ 04:05)  HR: 86 (10-19-17 @ 04:05) (80 - 97)  BP: 112/78 (10-19-17 @ 04:05) (106/72 - 130/89)  RR: 18 (10-19-17 @ 04:05) (18 - 18)  SpO2: 99% (10-19-17 @ 04:05) (97% - 100%)      PHYSICAL EXAM:  GENERAL: NAD, well-developed  HEAD:  Atraumatic, Normocephalic  EYES: EOMI, PERRLA, conjunctiva and sclera clear  NECK: Supple, No JVD  CHEST/LUNG: Clear to auscultation bilaterally; No wheeze  HEART: Regular rate and rhythm; No murmurs, rubs, or gallops  ABDOMEN: Soft, Nontender, Nondistended; Bowel sounds present  EXTREMITIES:  2+ Peripheral Pulses, No clubbing, cyanosis, or edema  PSYCH: AAOx3  NEUROLOGY: non-focal  SKIN: No rashes or lesions    CAPILLARY BLOOD GLUCOSE        I&O's Summary    18 Oct 2017 07:01  -  19 Oct 2017 07:00  --------------------------------------------------------  IN: 3040 mL / OUT: 1700 mL / NET: 1340 mL        LABS                        10.1   10.1  )-----------( 141      ( 18 Oct 2017 08:44 )             31.4     10-18    138  |  102  |  10  ----------------------------<  86  3.6   |  22  |  0.76    Ca    8.8      18 Oct 2017 08:44  Phos  3.8     10-18  Mg     1.9     10-18    TPro  7.2  /  Alb  4.2  /  TBili  0.2  /  DBili  x   /  AST  25  /  ALT  17  /  AlkPhos  44  10-18              Microbiology:  Urine Cx:  Blood Cx:    RADIOLOGY & ADDITIONAL TESTS:  X- Ray:  CT:  Ultrasound: Patient is a 32y old  Female who presents with a chief complaint of alcohol withdrawal (13 Oct 2017 15:40)    SUBJECTIVE / OVERNIGHT EVENTS:  No acute events overnight. Patient needed     MEDICATIONS  (STANDING):  busPIRone 7.5 milliGRAM(s) Oral every 12 hours  enoxaparin Injectable 40 milliGRAM(s) SubCutaneous daily  ferrous    sulfate 325 milliGRAM(s) Oral daily  folic acid 1 milliGRAM(s) Oral daily  gabapentin 300 milliGRAM(s) Oral three times a day  melatonin 3 milliGRAM(s) Oral at bedtime  multivitamin 1 Tablet(s) Oral daily  nicotine - 21 mG/24Hr(s) Patch 1 patch Transdermal daily  thiamine 100 milliGRAM(s) Oral daily  topiramate 100 milliGRAM(s) Oral two times a day  traZODone 100 milliGRAM(s) Oral at bedtime  venlafaxine XR. 150 milliGRAM(s) Oral daily    MEDICATIONS  (PRN):  haloperidol     Tablet 2 milliGRAM(s) Oral three times a day PRN agitation  hydrOXYzine hydrochloride 25 milliGRAM(s) Oral every 6 hours PRN Agitation/anxiety      REVIEW OF SYSTEMS:  CONSTITUTIONAL: Denies weakness, fevers and chills  EYES/ENT: Denies visual changes;  denies vertigo and throat pain   NECK: Denies neck pain and stiffness  RESPIRATORY: Denies shortness of breath, denies cough, wheezing, hemoptysis  CARDIOVASCULAR: Denies chest pain and palpitations  ENDOCRINE: Denies weight loss/gain. Denies cold or heat intolerance  GASTROINTESTINAL: Denies abdominal or epigastric pain. denies nausea, vomiting, and hematemesis; Denies diarrhea & constipation. Denies melena and hematochezia.  GENITOURINARY: Denies dysuria, frequency and hematuria  NEUROLOGICAL: Denies numbness and weakness  SKIN: Denies itching and rashes    T(C): 37 (10-19-17 @ 04:05), Max: 37 (10-19-17 @ 04:05)  HR: 86 (10-19-17 @ 04:05) (80 - 97)  BP: 112/78 (10-19-17 @ 04:05) (106/72 - 130/89)  RR: 18 (10-19-17 @ 04:05) (18 - 18)  SpO2: 99% (10-19-17 @ 04:05) (97% - 100%)      PHYSICAL EXAM:  GENERAL: NAD, well-developed  HEAD:  Atraumatic, Normocephalic  EYES: EOMI, PERRLA, conjunctiva and sclera clear  NECK: Supple, No JVD  CHEST/LUNG: Clear to auscultation bilaterally; No wheeze  HEART: Regular rate and rhythm; No murmurs, rubs, or gallops  ABDOMEN: Soft, Nontender, Nondistended; Bowel sounds present  EXTREMITIES:  2+ Peripheral Pulses, No clubbing, cyanosis, or edema  PSYCH: AAOx3  NEUROLOGY: non-focal  SKIN: No rashes or lesions    CAPILLARY BLOOD GLUCOSE        I&O's Summary    18 Oct 2017 07:01  -  19 Oct 2017 07:00  --------------------------------------------------------  IN: 3040 mL / OUT: 1700 mL / NET: 1340 mL        LABS                        10.1   10.1  )-----------( 141      ( 18 Oct 2017 08:44 )             31.4     10-18    138  |  102  |  10  ----------------------------<  86  3.6   |  22  |  0.76    Ca    8.8      18 Oct 2017 08:44  Phos  3.8     10-18  Mg     1.9     10-18    TPro  7.2  /  Alb  4.2  /  TBili  0.2  /  DBili  x   /  AST  25  /  ALT  17  /  AlkPhos  44  10-18              Microbiology:  Urine Cx:  Blood Cx:    RADIOLOGY & ADDITIONAL TESTS:  X- Ray:  CT:  Ultrasound: Patient is a 32y old  Female who presents with a chief complaint of alcohol withdrawal (13 Oct 2017 15:40)    SUBJECTIVE / OVERNIGHT EVENTS:  No acute events overnight. Patient requested dose of hyroxizine and PO haldol x1. This AM patient reports her anxiety is improved. She says she had a good night's sleep. She is still endorsing chronic SI and states that she is concerned about her potential to relapse when discharged from the hospital.     MEDICATIONS  (STANDING):  busPIRone 7.5 milliGRAM(s) Oral every 12 hours  enoxaparin Injectable 40 milliGRAM(s) SubCutaneous daily  ferrous    sulfate 325 milliGRAM(s) Oral daily  folic acid 1 milliGRAM(s) Oral daily  gabapentin 300 milliGRAM(s) Oral three times a day  melatonin 3 milliGRAM(s) Oral at bedtime  multivitamin 1 Tablet(s) Oral daily  nicotine - 21 mG/24Hr(s) Patch 1 patch Transdermal daily  thiamine 100 milliGRAM(s) Oral daily  topiramate 100 milliGRAM(s) Oral two times a day  traZODone 100 milliGRAM(s) Oral at bedtime  venlafaxine XR. 150 milliGRAM(s) Oral daily    MEDICATIONS  (PRN):  haloperidol     Tablet 2 milliGRAM(s) Oral three times a day PRN agitation  hydrOXYzine hydrochloride 25 milliGRAM(s) Oral every 6 hours PRN Agitation/anxiety      REVIEW OF SYSTEMS:  CONSTITUTIONAL: Denies weakness, fevers and chills  EYES/ENT: Denies visual changes;  denies vertigo and throat pain   NECK: Denies neck pain and stiffness  RESPIRATORY: Denies shortness of breath, denies cough, wheezing, hemoptysis  CARDIOVASCULAR: Denies chest pain and palpitations  ENDOCRINE: Denies weight loss/gain. Denies cold or heat intolerance  GASTROINTESTINAL: Denies abdominal or epigastric pain. denies nausea, vomiting, and hematemesis; Denies diarrhea & constipation. Denies melena and hematochezia.  GENITOURINARY: Denies dysuria, frequency and hematuria  NEUROLOGICAL: Denies numbness and weakness  SKIN: Denies itching and rashes    T(C): 37 (10-19-17 @ 04:05), Max: 37 (10-19-17 @ 04:05)  HR: 86 (10-19-17 @ 04:05) (80 - 97)  BP: 112/78 (10-19-17 @ 04:05) (106/72 - 130/89)  RR: 18 (10-19-17 @ 04:05) (18 - 18)  SpO2: 99% (10-19-17 @ 04:05) (97% - 100%)      PHYSICAL EXAM:  GENERAL: NAD, well-developed, mildly anxious  HEAD:  Atraumatic, Normocephalic  EYES: EOMI, PERRLA, conjunctiva and sclera clear  NECK: Supple, No JVD  CHEST/LUNG: Clear to auscultation bilaterally; No wheeze  HEART: Regular rate and rhythm; No murmurs, rubs, or gallops  ABDOMEN: Soft, Nontender, Nondistended; Bowel sounds present  EXTREMITIES:  2+ Peripheral Pulses, well-healing scars on L wrist  PSYCH: AAOx3  NEUROLOGY: non-focal, faint tremor  SKIN: No rashes or lesions    CAPILLARY BLOOD GLUCOSE        I&O's Summary    18 Oct 2017 07:01  -  19 Oct 2017 07:00  --------------------------------------------------------  IN: 3040 mL / OUT: 1700 mL / NET: 1340 mL        LABS                        10.1   10.1  )-----------( 141      ( 18 Oct 2017 08:44 )             31.4     10-18    138  |  102  |  10  ----------------------------<  86  3.6   |  22  |  0.76    Ca    8.8      18 Oct 2017 08:44  Phos  3.8     10-18  Mg     1.9     10-18    TPro  7.2  /  Alb  4.2  /  TBili  0.2  /  DBili  x   /  AST  25  /  ALT  17  /  AlkPhos  44  10-18      Barbiturates Screen, Urine (10.18.17 @ 19:57)    Barbiturates Screen, Urine: Positive: TEST REPEATED.

## 2017-10-19 NOTE — PROGRESS NOTE BEHAVIORAL HEALTH - NS ED B[facility] MED ROS EYES
No complaints
Unable to assess
No complaints
No complaints

## 2017-10-19 NOTE — PROGRESS NOTE BEHAVIORAL HEALTH - NSBHATTESTSEENBY_PSY_A_CORE
Attending Psychiatrist supervising NP/Trainee, meeting pt...
attending Psychiatrist without NP/Trainee
Attending Psychiatrist supervising NP/Trainee, meeting pt...
attending Psychiatrist without NP/Trainee

## 2017-10-19 NOTE — PROGRESS NOTE BEHAVIORAL HEALTH - PRIMARY DX
Moderate episode of recurrent major depressive disorder

## 2017-10-19 NOTE — PROGRESS NOTE BEHAVIORAL HEALTH - NS ED [facility] REVIEW OF ED CHART AVAILABLE IMAGING REVIEWED
None available

## 2017-10-19 NOTE — PROGRESS NOTE ADULT - PROBLEM SELECTOR PLAN 2
Patient with history of depression and anxiety. Also hx of PTSD and BPD.   - continue effexor XR 150mg daily, topamax 100mg BID today, trazadone 100mg qhs, gabapentin 300 TID  - started on buspirone 7.5mg q12H for anxiety  - melatonin 3mg at bedtime for sleep  - patient has appointment with outpatient psychiatry in substance use program, may need inpatient treatment Patient with history of depression and anxiety. Also hx of PTSD and BPD.   - continue effexor XR 150mg daily, topamax 100mg BID today, trazadone 100mg qhs, gabapentin 300 TID, buspirone 7.5mg q12H for anxiety  - hydroxyzine 25mg q6 prn  - melatonin 3mg at bedtime for sleep  - patient to go to inpatient psychiatry, awaiting definitive bed/placement   - has f/u at outpatient program on Oct 24th (patient may cancel appointment)

## 2017-10-19 NOTE — PROVIDER CONTACT NOTE (MEDICATION) - SITUATION
Pt admit with alcohol use with intoxication; pt states she has a headache and knee pain 7/10 from past fall; requesting tylenol

## 2017-10-19 NOTE — PROGRESS NOTE ADULT - PROVIDER SPECIALTY LIST ADULT
Internal Medicine
MICU
MICU
Internal Medicine

## 2017-10-19 NOTE — PROGRESS NOTE BEHAVIORAL HEALTH - NSBHCHARTREVIEWVS_PSY_A_CORE FT
Vital Signs Last 24 Hrs  T(C): 36.8 (15 Oct 2017 09:00), Max: 37.1 (15 Oct 2017 05:56)  T(F): 98.2 (15 Oct 2017 09:00), Max: 98.7 (15 Oct 2017 05:56)  HR: 84 (15 Oct 2017 09:00) (76 - 106)  BP: 117/78 (15 Oct 2017 09:00) (98/62 - 117/78)  BP(mean): --  RR: 18 (15 Oct 2017 09:00) (18 - 18)  SpO2: 100% (15 Oct 2017 09:00) (97% - 100%)
T(C): 36.9 (10-09-17 @ 10:00), Max: 36.9 (10-09-17 @ 10:00)  HR: 63 (10-09-17 @ 11:00) (58 - 109)  BP: 97/57 (10-09-17 @ 11:00) (76/52 - 129/78)  RR: 13 (10-09-17 @ 11:00) (13 - 18)  SpO2: 99% (10-09-17 @ 11:00) (97% - 100%)  Wt(kg): --
T(C): 36.6 (10-18-17 @ 16:30), Max: 36.9 (10-18-17 @ 05:30)  HR: 94 (10-18-17 @ 16:30) (88 - 102)  BP: 116/86 (10-18-17 @ 16:30) (106/72 - 116/86)  RR: 18 (10-18-17 @ 16:30) (16 - 18)  SpO2: 99% (10-18-17 @ 16:30) (97% - 99%)  Wt(kg): --
Vital Signs Last 24 Hrs  T(C): 36.6 (19 Oct 2017 11:44), Max: 37 (19 Oct 2017 04:05)  T(F): 97.9 (19 Oct 2017 11:44), Max: 98.6 (19 Oct 2017 04:05)  HR: 90 (19 Oct 2017 11:44) (80 - 94)  BP: 116/73 (19 Oct 2017 11:44) (112/78 - 130/89)  BP(mean): --  RR: 18 (19 Oct 2017 11:44) (18 - 18)  SpO2: 100% (19 Oct 2017 11:44) (99% - 100%)
Vital Signs Last 24 Hrs  T(C): 36.8 (14 Oct 2017 14:30), Max: 36.8 (14 Oct 2017 02:00)  T(F): 98.3 (14 Oct 2017 14:30), Max: 98.3 (14 Oct 2017 14:30)  HR: 77 (14 Oct 2017 14:30) (70 - 77)  BP: 101/65 (14 Oct 2017 14:30) (92/63 - 106/70)  BP(mean): --  RR: 18 (14 Oct 2017 14:30) (16 - 18)  SpO2: 100% (14 Oct 2017 14:30) (99% - 100%)
Vital Signs Last 24 Hrs  T(C): 36.8 (13 Oct 2017 13:14), Max: 36.8 (13 Oct 2017 06:20)  T(F): 98.2 (13 Oct 2017 13:14), Max: 98.2 (13 Oct 2017 06:20)  HR: 73 (13 Oct 2017 13:14) (67 - 84)  BP: 93/60 (13 Oct 2017 13:14) (93/60 - 99/63)  BP(mean): --  RR: 96 (13 Oct 2017 13:14) (18 - 96)  SpO2: 98% (13 Oct 2017 06:20) (98% - 98%)
Vital Signs Last 24 Hrs  T(C): 37.4 (12 Oct 2017 04:00), Max: 37.4 (11 Oct 2017 20:00)  T(F): 99.3 (12 Oct 2017 04:00), Max: 99.3 (11 Oct 2017 20:00)  HR: 68 (12 Oct 2017 07:00) (62 - 98)  BP: 110/53 (12 Oct 2017 06:00) (76/52 - 110/53)  BP(mean): 68 (12 Oct 2017 06:00) (59 - 76)  RR: 14 (12 Oct 2017 07:00) (13 - 27)  SpO2: 98% (12 Oct 2017 07:00) (97% - 100%)
ICU Vital Signs Last 24 Hrs  T(C): 37.3 (10 Oct 2017 08:00), Max: 37.3 (10 Oct 2017 08:00)  T(F): 99.1 (10 Oct 2017 08:00), Max: 99.1 (10 Oct 2017 08:00)  HR: 63 (10 Oct 2017 10:00) (58 - 107)  BP: 113/72 (10 Oct 2017 10:00) (88/58 - 125/83)  BP(mean): 88 (10 Oct 2017 10:00) (67 - 100)  ABP: --  ABP(mean): --  RR: 21 (10 Oct 2017 10:00) (14 - 32)  SpO2: 100% (10 Oct 2017 10:00) (89% - 100%)

## 2017-10-19 NOTE — PROGRESS NOTE ADULT - PROBLEM SELECTOR PROBLEM 1
Alcohol withdrawal
Alcohol use disorder
Alcohol use disorder
Alcohol withdrawal
Major depression, recurrent

## 2017-10-19 NOTE — PROGRESS NOTE BEHAVIORAL HEALTH - GAIT / STATION
Normal gait / station
Other

## 2017-10-19 NOTE — PROGRESS NOTE ADULT - PROBLEM SELECTOR PLAN 3
Patient is homeless and has been to rehab multiple times without success. Patient is motivated to go to inpatient rehab but having difficulties finding placement given insurance restrictions. Patient is being rejected from multiple facilities.   - SW/ROS following, appreciate recs  - patient has been rejected from inpatient alcohol rehab as well as shelter placement bc she receives SSD insurance Patient is homeless and has been to rehab multiple times without success. Patient is motivated to go to inpatient rehab but having difficulties finding placement given insurance restrictions. Patient is being rejected from multiple facilities including shelters. Patient endorsing chronic SI and requesting inpatient psych admission to be in controlled environment without access to substances.    - SW/CM following, awaiting placement at inpatient psych facility

## 2017-10-19 NOTE — PROGRESS NOTE BEHAVIORAL HEALTH - NSBHCHARTREVIEWLAB_PSY_A_CORE FT
10.1   6.96  )-----------( 321      ( 15 Oct 2017 09:05 )             33.7     10-14    139  |  106  |  8   ----------------------------<  72  4.1   |  21<L>  |  0.68    Ca    9.1      14 Oct 2017 08:38  Phos  2.8     10-14  Mg     1.9     10-14    TPro  6.4  /  Alb  3.7  /  TBili  <0.2  /  DBili  x   /  AST  19  /  ALT  9<L>  /  AlkPhos  41  10-14
9.5    8.9   )-----------( 261      ( 09 Oct 2017 02:12 )             29.8       10-09    140  |  108  |  4<L>  ----------------------------<  139<H>  3.8   |  20<L>  |  0.66    Ca    7.6<L>      09 Oct 2017 02:12  Phos  3.6     10-09  Mg     1.7     10-09    TPro  7.0  /  Alb  4.2  /  TBili  0.3  /  DBili  x   /  AST  26  /  ALT  23  /  AlkPhos  46  10-08
10.1   10.1  )-----------( 141      ( 18 Oct 2017 08:44 )             31.4     10-18    138  |  102  |  10  ----------------------------<  86  3.6   |  22  |  0.76    Ca    8.8      18 Oct 2017 08:44  Phos  3.8     10-18  Mg     1.9     10-18    TPro  7.2  /  Alb  4.2  /  TBili  0.2  /  DBili  x   /  AST  25  /  ALT  17  /  AlkPhos  44  10-18
10-18    138  |  102  |  10  ----------------------------<  86  3.6   |  22  |  0.76    Ca    8.8      18 Oct 2017 08:44  Phos  3.8     10-18  Mg     1.9     10-18    TPro  7.2  /  Alb  4.2  /  TBili  0.2  /  DBili  x   /  AST  25  /  ALT  17  /  AlkPhos  44  10-18  LIVER FUNCTIONS - ( 18 Oct 2017 08:44 )  Alb: 4.2 g/dL / Pro: 7.2 g/dL / ALK PHOS: 44 U/L / ALT: 17 U/L RC / AST: 25 U/L / GGT: x                               10.1   10.1  )-----------( 141      ( 18 Oct 2017 08:44 )             31.4
9.1    6.26  )-----------( 291      ( 14 Oct 2017 08:19 )             30.4     10-14    139  |  106  |  8   ----------------------------<  72  4.1   |  21<L>  |  0.68    Ca    9.1      14 Oct 2017 08:38  Phos  2.8     10-14  Mg     1.9     10-14    TPro  6.4  /  Alb  3.7  /  TBili  <0.2  /  DBili  x   /  AST  19  /  ALT  9<L>  /  AlkPhos  41  10-14
10-13    136  |  100  |  6<L>  ----------------------------<  78  4.0   |  24  |  0.60    Ca    8.9      13 Oct 2017 08:35  Phos  3.1     10-13  Mg     1.9     10-13    TPro  6.8  /  Alb  3.7  /  TBili  <0.2  /  DBili  x   /  AST  22  /  ALT  13  /  AlkPhos  42  10-13  LIVER FUNCTIONS - ( 13 Oct 2017 08:35 )  Alb: 3.7 g/dL / Pro: 6.8 g/dL / ALK PHOS: 42 U/L / ALT: 13 U/L / AST: 22 U/L / GGT: x                               9.4    6.45  )-----------( 287      ( 13 Oct 2017 08:40 )             30.5
10-12    137  |  101  |  11  ----------------------------<  83  3.9   |  22  |  0.68    Ca    9.0      12 Oct 2017 04:53  Phos  3.9     10-12  Mg     1.9     10-12    TPro  7.2  /  Alb  4.2  /  TBili  0.1<L>  /  DBili  x   /  AST  34  /  ALT  20  /  AlkPhos  45  10-11  LIVER FUNCTIONS - ( 11 Oct 2017 04:46 )  Alb: 4.2 g/dL / Pro: 7.2 g/dL / ALK PHOS: 45 U/L / ALT: 20 U/L RC / AST: 34 U/L / GGT: x         CBC Full  -  ( 12 Oct 2017 04:53 )  WBC Count : 7.2 K/uL  Hemoglobin : 10.9 g/dL  Hematocrit : 33.7 %  Platelet Count - Automated : 276 K/uL  Mean Cell Volume : 85.6 fl  Mean Cell Hemoglobin : 27.7 pg  Mean Cell Hemoglobin Concentration : 32.3 gm/dL  Auto Neutrophil # : 3.5 K/uL  Auto Lymphocyte # : 2.5 K/uL  Auto Monocyte # : 0.8 K/uL  Auto Eosinophil # : 0.3 K/uL  Auto Basophil # : 0.1 K/uL  Auto Neutrophil % : 49.5 %  Auto Lymphocyte % : 34.4 %  Auto Monocyte % : 10.5 %  Auto Eosinophil % : 4.6 %  Auto Basophil % : 1.0 %
10-10    138  |  103  |  5<L>  ----------------------------<  87  4.2   |  20<L>  |  0.67    Ca    9.4      10 Oct 2017 02:32  Phos  4.2     10-10  Mg     2.1     10-10    TPro  6.7  /  Alb  3.9  /  TBili  0.2  /  DBili  x   /  AST  20  /  ALT  19  /  AlkPhos  45  10-10  LIVER FUNCTIONS - ( 10 Oct 2017 02:32 )  Alb: 3.9 g/dL / Pro: 6.7 g/dL / ALK PHOS: 45 U/L / ALT: 19 U/L RC / AST: 20 U/L / GGT: x         CBC Full  -  ( 10 Oct 2017 02:32 )  WBC Count : 7.8 K/uL  Hemoglobin : 10.8 g/dL  Hematocrit : 34.0 %  Platelet Count - Automated : 262 K/uL  Mean Cell Volume : 85.9 fl  Mean Cell Hemoglobin : 27.2 pg  Mean Cell Hemoglobin Concentration : 31.7 gm/dL  Auto Neutrophil # : 4.1 K/uL  Auto Lymphocyte # : 2.7 K/uL  Auto Monocyte # : 0.5 K/uL  Auto Eosinophil # : 0.3 K/uL  Auto Basophil # : 0.1 K/uL  Auto Neutrophil % : 53.1 %  Auto Lymphocyte % : 35.3 %  Auto Monocyte % : 7.0 %  Auto Eosinophil % : 3.8 %  Auto Basophil % : 0.9 %

## 2017-10-19 NOTE — PROGRESS NOTE BEHAVIORAL HEALTH - NSBHCONSULTMEDPRNREASON_PSY_A_CORE
agitation.../severe agitation.../other prn reason...
severe agitation.../agitation.../other prn reason...
other prn reason.../agitation.../severe agitation...
other prn reason...
agitation.../severe agitation.../other prn reason...
severe agitation.../agitation.../other prn reason...
agitation.../other prn reason.../severe agitation...
severe agitation.../other prn reason.../agitation...

## 2017-10-19 NOTE — PROGRESS NOTE BEHAVIORAL HEALTH - NSBHCONSULTOBSREASON_PSY_A_CORE FT
for increased CIWA, history of impulsivity and self injurious behaviors x 24hrs
SI
for increased CIWA, history of impulsivity and self injurious behaviors x 24hrs
SI

## 2017-10-19 NOTE — PROVIDER CONTACT NOTE (MEDICATION) - BACKGROUND
Pt admit with alcohol use with intoxication; hx major depression; PTSD; borderline personality disorder

## 2017-10-19 NOTE — PROGRESS NOTE ADULT - PROBLEM SELECTOR PROBLEM 2
History of psychiatric hospitalization
Alcohol use disorder
History of psychiatric hospitalization
Major depression, recurrent
Major depression, recurrent

## 2017-10-19 NOTE — PROGRESS NOTE BEHAVIORAL HEALTH - THOUGHT CONTENT
Unremarkable
Unremarkable
Suicidality/Preoccupations/Hopelessness
Unremarkable
Preoccupations/Hopelessness/Suicidality
Unremarkable
Unremarkable
Other

## 2017-10-19 NOTE — PROGRESS NOTE BEHAVIORAL HEALTH - NSBHFUPSUICINTERVAL_PSY_A_CORE
unable to assess
none known
none known
unable to assess
none known
none known
unable to assess
unable to assess

## 2017-10-19 NOTE — PROGRESS NOTE BEHAVIORAL HEALTH - RISK ASSESSMENT
Moderate risk: Risk factors: chronic and unmodifiable, such as homelessness, poor social support, past history of attempts and self injurious behaviors, impulsivity, noncomplaint with treatment, depressed mood, current etoh withdrawal and reports tremor anxiety, hallucinations  Protective factors: currently denies si/hi with no i/i/p, no tita, fair sleep and appetite, is future oriented, has insight into her substance use problem and agreeable to rehab
risk factors: homelessness, poor social support, past history of attempts and self injurious behaviors, impulsivity, noncomplaint with treatment, depressed mood, current etoh withdrawal and reports tremor anxiety, hallucinations  protective factors: currently denies si/hi with no i/i/p, no tita, fair sleep and appetite, is future oriented wanting to go to rehab
Moderate risk: Risk factors: chronic and unmodifiable, such as homelessness, poor social support, past history of attempts and self injurious behaviors, impulsivity, noncomplaint with treatment, depressed mood, current etoh withdrawal and reports tremor anxiety, hallucinations  Protective factors: currently denies si/hi with no i/i/p, no tita, fair sleep and appetite, is future oriented, has insight into her substance use problem and agreeable to rehab
risk factors: homelessness, poor social support, past history of attempts and self injurious behaviors, impulsivity, noncomplaint with treatment, depressed mood, current etoh withdrawal and reports tremor anxiety, hallucinations  protective factors: currently denies si/hi with no i/i/p, no tita, fair sleep and appetite, is future oriented wanting to go to rehab
Elevated risk: Risk factors: chronic and unmodifiable, such as homelessness, poor social support, past history of attempts and self injurious behaviors, impulsivity, noncomplaint with treatment, depressed mood, current etoh withdrawal and reports tremor anxiety, hallucinations  Protective factors: currently denies si/hi with no i/i/p, no tita, fair sleep and appetite, is future oriented, has insight into her substance use problem and agreeable to rehab
Moderate risk: Risk factors: chronic and unmodifiable, such as homelessness, poor social support, past history of attempts and self injurious behaviors, impulsivity, noncomplaint with treatment, depressed mood, current etoh withdrawal and reports tremor anxiety, hallucinations  Protective factors: currently denies si/hi with no i/i/p, no tita, fair sleep and appetite, is future oriented, has insight into her substance use problem and agreeable to rehab
Moderate risk: Risk factors: chronic and unmodifiable, such as homelessness, poor social support, past history of attempts and self injurious behaviors, impulsivity, noncomplaint with treatment, depressed mood, current etoh withdrawal and reports tremor anxiety, hallucinations  Protective factors: currently denies si/hi with no i/i/p, no tita, fair sleep and appetite, is future oriented, has insight into her substance use problem and agreeable to rehab
Elevated risk: Risk factors: chronic and unmodifiable, such as homelessness, poor social support, past history of attempts and self injurious behaviors, impulsivity, noncomplaint with treatment, depressed mood, current etoh withdrawal and reports tremor anxiety, hallucinations  Protective factors: currently denies si/hi with no i/i/p, no tita, fair sleep and appetite, is future oriented, has insight into her substance use problem and agreeable to rehab

## 2017-10-19 NOTE — PROGRESS NOTE BEHAVIORAL HEALTH - BODY HABITUS
Well nourished

## 2017-10-19 NOTE — PROGRESS NOTE BEHAVIORAL HEALTH - NSBHFUPTYPE_PSY_A_CORE
Consult Follow Up

## 2017-10-19 NOTE — PROGRESS NOTE ADULT - ASSESSMENT
31yo woman with PMHx of alcohol use d/o and per report multiple admissions for alcohol WD c/b by hallucinations and seizures who presents with intoxication. Patient has history of multiple attempts at alcohol rehabilitation, inpatient psychiatric admissions, suicide attempts, and cutting behaviors with poor social supports and currently homeless. Patient was admitted for management of alcohol withdrawal and placed on librium taper and CIWA protocol, transitioned to ativan taper. Course c/b by oversedation from triggering of CIWA prns and transferred to MICU. Placed on phenobarbital with decrease in CIWA scores. Now transferred back to floor, holding all benzos and restarted on psych meds. Course further complicated by patient reports drinking hand  and cleaning supplies, now stating she is in withdrawals again. 31yo woman with PMHx of alcohol use d/o and per report multiple admissions for alcohol WD c/b by hallucinations and seizures who presents with intoxication. Patient has history of multiple attempts at alcohol rehabilitation, inpatient psychiatric admissions, suicide attempts, and cutting behaviors with poor social supports and currently homeless. Patient was admitted for management of alcohol withdrawal and placed on librium taper and CIWA protocol, transitioned to ativan taper. Course c/b by oversedation from triggering of CIWA prns and transferred to MICU. Placed on phenobarbital with decrease in CIWA scores. Now transferred back to floor, holding all benzos and restarted on psych meds. Course further complicated by patient reports drinking hand  and cleaning supplies, now stating she is in withdrawals again. Clinically patient was not exhibiting signs of symptoms of withdrawal. Patient endorsed chronic SI and psych was called to re-evaluate. Now recommending inpatient psychiatric admission.

## 2017-10-19 NOTE — PROGRESS NOTE BEHAVIORAL HEALTH - SECONDARY DX2
Post traumatic stress disorder (PTSD)

## 2017-10-19 NOTE — PROGRESS NOTE BEHAVIORAL HEALTH - NSBHFUPINTERVALHXFT_PSY_A_CORE
Pt required Haldol PRN overnight. Pt found sitting up in bed, reports benefitting from Vistaril but continued to have thoughts of harming herself. She reports that she continues to feel impulsive and can not trust herself to abstain from alcohol, continues to think about drinking hand  on the unit and requests to be hospitalized psychiatrically.

## 2017-10-19 NOTE — PROGRESS NOTE BEHAVIORAL HEALTH - NSBHHPIREASONCLOTHER_PSY_A_CORE FT
substance withdrawal/borderline PD
substance withdrawal/borderline PD
borderline PD
substance withdrawal/borderline PD
borderline PD
substance withdrawal/borderline PD

## 2017-10-19 NOTE — PROGRESS NOTE BEHAVIORAL HEALTH - NS ED BHA MSE GENERAL APPEARANCE
Well developed

## 2017-10-19 NOTE — PROGRESS NOTE BEHAVIORAL HEALTH - NSBHCHARTREVIEWINVESTIGATE_PSY_A_CORE FT
< from: 12 Lead ECG (10.05.17 @ 18:43) >      Ventricular Rate 94 BPM    Atrial Rate 94 BPM    P-R Interval 138 ms    QRS Duration 88 ms     ms    QTc 485 ms    P Axis 64 degrees    R Axis 58 degrees    T Axis 57 degrees    Diagnosis Line NORMAL SINUS RHYTHM  PROLONGED QT  ABNORMAL ECG    < end of copied text >
< from: 12 Lead ECG (10.11.17 @ 19:04) >      Ventricular Rate 84 BPM    Atrial Rate 84 BPM    P-R Interval 134 ms    QRS Duration 76 ms     ms    QTc 434 ms    P Axis 61 degrees    R Axis 58 degrees    T Axis 58 degrees    Diagnosis Line NORMAL SINUS RHYTHM  NORMAL ECG    < end of copied text >
10/18/17   Ventricular Rate 81 BPM    Atrial Rate 81 BPM    P-R Interval 156 ms    QRS Duration 88 ms     ms    QTc 453 ms    P Axis 48 degrees    R Axis 44 degrees    T Axis 45 degrees    Diagnosis Line NORMAL SINUS RHYTHM  NORMAL ECG
10/8/17 QTc 460 ms

## 2017-10-19 NOTE — PROGRESS NOTE BEHAVIORAL HEALTH - NSBHFUPINTERVALCCFT_PSY_A_CORE
"I took the vistaril and felt better."
"They have not given me my medications."
"I still want to go to rehab."
"I drank a lot."
"-"
"They gave me my medications. I slept better, I feel better"
"I called myself to get into rehab."
"I drank the hand "

## 2017-10-19 NOTE — PROGRESS NOTE BEHAVIORAL HEALTH - NSBHCONSFOLLOWNEEDS_PSY_A_CORE
Patient needs further psychiatric safety assessment prior to discharge

## 2017-10-19 NOTE — PROGRESS NOTE BEHAVIORAL HEALTH - SECONDARY DX1
Borderline personality disorder

## 2017-10-19 NOTE — PROGRESS NOTE BEHAVIORAL HEALTH - NSBHCONSULTMEDSEVERE_PSY_A_CORE FT
Haldol 5mg PO/IV/IM Q6 PRN with Benadryl 50mg PO/IV/IM Q6 PRN

## 2017-10-20 VITALS
DIASTOLIC BLOOD PRESSURE: 63 MMHG | RESPIRATION RATE: 18 BRPM | OXYGEN SATURATION: 100 % | SYSTOLIC BLOOD PRESSURE: 135 MMHG | TEMPERATURE: 99 F | WEIGHT: 107.81 LBS

## 2017-10-20 PROCEDURE — 99232 SBSQ HOSP IP/OBS MODERATE 35: CPT

## 2017-10-20 RX ORDER — HALOPERIDOL DECANOATE 100 MG/ML
2 INJECTION INTRAMUSCULAR EVERY 6 HOURS
Qty: 0 | Refills: 0 | Status: DISCONTINUED | OUTPATIENT
Start: 2017-10-20 | End: 2017-10-27

## 2017-10-20 RX ORDER — TRAZODONE HCL 50 MG
100 TABLET ORAL ONCE
Qty: 0 | Refills: 0 | Status: COMPLETED | OUTPATIENT
Start: 2017-10-20 | End: 2017-10-20

## 2017-10-20 RX ORDER — VENLAFAXINE HCL 75 MG
150 CAPSULE, EXT RELEASE 24 HR ORAL DAILY
Qty: 0 | Refills: 0 | Status: DISCONTINUED | OUTPATIENT
Start: 2017-10-20 | End: 2017-10-23

## 2017-10-20 RX ORDER — LANOLIN ALCOHOL/MO/W.PET/CERES
3 CREAM (GRAM) TOPICAL ONCE
Qty: 0 | Refills: 0 | Status: COMPLETED | OUTPATIENT
Start: 2017-10-20 | End: 2017-10-20

## 2017-10-20 RX ORDER — TOPIRAMATE 25 MG
100 TABLET ORAL
Qty: 0 | Refills: 0 | Status: DISCONTINUED | OUTPATIENT
Start: 2017-10-20 | End: 2017-10-27

## 2017-10-20 RX ORDER — LANOLIN ALCOHOL/MO/W.PET/CERES
3 CREAM (GRAM) TOPICAL AT BEDTIME
Qty: 0 | Refills: 0 | Status: DISCONTINUED | OUTPATIENT
Start: 2017-10-20 | End: 2017-10-27

## 2017-10-20 RX ORDER — IBUPROFEN 200 MG
400 TABLET ORAL
Qty: 0 | Refills: 0 | Status: DISCONTINUED | OUTPATIENT
Start: 2017-10-20 | End: 2017-10-21

## 2017-10-20 RX ORDER — HALOPERIDOL DECANOATE 100 MG/ML
2 INJECTION INTRAMUSCULAR ONCE
Qty: 0 | Refills: 0 | Status: DISCONTINUED | OUTPATIENT
Start: 2017-10-20 | End: 2017-10-27

## 2017-10-20 RX ORDER — FOLIC ACID 0.8 MG
1 TABLET ORAL DAILY
Qty: 0 | Refills: 0 | Status: DISCONTINUED | OUTPATIENT
Start: 2017-10-20 | End: 2017-10-27

## 2017-10-20 RX ORDER — GABAPENTIN 400 MG/1
300 CAPSULE ORAL THREE TIMES A DAY
Qty: 0 | Refills: 0 | Status: DISCONTINUED | OUTPATIENT
Start: 2017-10-20 | End: 2017-10-27

## 2017-10-20 RX ORDER — HYDROXYZINE HCL 10 MG
25 TABLET ORAL EVERY 6 HOURS
Qty: 0 | Refills: 0 | Status: DISCONTINUED | OUTPATIENT
Start: 2017-10-20 | End: 2017-10-21

## 2017-10-20 RX ORDER — GABAPENTIN 400 MG/1
300 CAPSULE ORAL ONCE
Qty: 0 | Refills: 0 | Status: COMPLETED | OUTPATIENT
Start: 2017-10-20 | End: 2017-10-20

## 2017-10-20 RX ORDER — NICOTINE POLACRILEX 2 MG
2 GUM BUCCAL
Qty: 0 | Refills: 0 | Status: DISCONTINUED | OUTPATIENT
Start: 2017-10-20 | End: 2017-10-27

## 2017-10-20 RX ORDER — TRAZODONE HCL 50 MG
100 TABLET ORAL AT BEDTIME
Qty: 0 | Refills: 0 | Status: DISCONTINUED | OUTPATIENT
Start: 2017-10-20 | End: 2017-10-27

## 2017-10-20 RX ORDER — TOPIRAMATE 25 MG
100 TABLET ORAL ONCE
Qty: 0 | Refills: 0 | Status: COMPLETED | OUTPATIENT
Start: 2017-10-20 | End: 2017-10-20

## 2017-10-20 RX ORDER — FERROUS SULFATE 325(65) MG
325 TABLET ORAL DAILY
Qty: 0 | Refills: 0 | Status: DISCONTINUED | OUTPATIENT
Start: 2017-10-20 | End: 2017-10-27

## 2017-10-20 RX ORDER — DIPHENHYDRAMINE HCL 50 MG
50 CAPSULE ORAL ONCE
Qty: 0 | Refills: 0 | Status: DISCONTINUED | OUTPATIENT
Start: 2017-10-20 | End: 2017-10-27

## 2017-10-20 RX ADMIN — GABAPENTIN 300 MILLIGRAM(S): 400 CAPSULE ORAL at 08:38

## 2017-10-20 RX ADMIN — Medication 25 MILLIGRAM(S): at 18:00

## 2017-10-20 RX ADMIN — Medication 7.5 MILLIGRAM(S): at 08:38

## 2017-10-20 RX ADMIN — Medication 100 MILLIGRAM(S): at 23:16

## 2017-10-20 RX ADMIN — Medication 1 TABLET(S): at 08:38

## 2017-10-20 RX ADMIN — Medication 1 MILLIGRAM(S): at 08:38

## 2017-10-20 RX ADMIN — Medication 100 MILLIGRAM(S): at 08:38

## 2017-10-20 RX ADMIN — Medication 3 MILLIGRAM(S): at 23:15

## 2017-10-20 RX ADMIN — GABAPENTIN 300 MILLIGRAM(S): 400 CAPSULE ORAL at 23:15

## 2017-10-20 RX ADMIN — GABAPENTIN 300 MILLIGRAM(S): 400 CAPSULE ORAL at 12:33

## 2017-10-20 RX ADMIN — Medication 150 MILLIGRAM(S): at 08:38

## 2017-10-20 RX ADMIN — Medication 100 MILLIGRAM(S): at 01:25

## 2017-10-20 RX ADMIN — Medication 400 MILLIGRAM(S): at 19:00

## 2017-10-20 RX ADMIN — Medication 3 MILLIGRAM(S): at 01:25

## 2017-10-20 RX ADMIN — Medication 325 MILLIGRAM(S): at 08:38

## 2017-10-20 RX ADMIN — GABAPENTIN 300 MILLIGRAM(S): 400 CAPSULE ORAL at 01:25

## 2017-10-21 PROCEDURE — 99222 1ST HOSP IP/OBS MODERATE 55: CPT

## 2017-10-21 PROCEDURE — 99232 SBSQ HOSP IP/OBS MODERATE 35: CPT

## 2017-10-21 RX ORDER — IBUPROFEN 200 MG
600 TABLET ORAL EVERY 6 HOURS
Qty: 0 | Refills: 0 | Status: DISCONTINUED | OUTPATIENT
Start: 2017-10-21 | End: 2017-10-27

## 2017-10-21 RX ORDER — HYDROXYZINE HCL 10 MG
50 TABLET ORAL EVERY 6 HOURS
Qty: 0 | Refills: 0 | Status: DISCONTINUED | OUTPATIENT
Start: 2017-10-21 | End: 2017-10-27

## 2017-10-21 RX ORDER — NICOTINE POLACRILEX 2 MG
1 GUM BUCCAL EVERY 6 HOURS
Qty: 0 | Refills: 0 | Status: DISCONTINUED | OUTPATIENT
Start: 2017-10-21 | End: 2017-10-22

## 2017-10-21 RX ADMIN — Medication 400 MILLIGRAM(S): at 08:57

## 2017-10-21 RX ADMIN — Medication 50 MILLIGRAM(S): at 16:45

## 2017-10-21 RX ADMIN — Medication 1 EACH: at 19:03

## 2017-10-21 RX ADMIN — GABAPENTIN 300 MILLIGRAM(S): 400 CAPSULE ORAL at 08:56

## 2017-10-21 RX ADMIN — Medication 100 MILLIGRAM(S): at 23:20

## 2017-10-21 RX ADMIN — Medication 100 MILLIGRAM(S): at 20:43

## 2017-10-21 RX ADMIN — Medication 325 MILLIGRAM(S): at 08:56

## 2017-10-21 RX ADMIN — GABAPENTIN 300 MILLIGRAM(S): 400 CAPSULE ORAL at 12:41

## 2017-10-21 RX ADMIN — Medication 400 MILLIGRAM(S): at 16:45

## 2017-10-21 RX ADMIN — Medication 150 MILLIGRAM(S): at 08:57

## 2017-10-21 RX ADMIN — Medication 400 MILLIGRAM(S): at 17:58

## 2017-10-21 RX ADMIN — Medication 2 MILLIGRAM(S): at 12:42

## 2017-10-21 RX ADMIN — Medication 1 TABLET(S): at 08:56

## 2017-10-21 RX ADMIN — Medication 100 MILLIGRAM(S): at 08:56

## 2017-10-21 RX ADMIN — Medication 400 MILLIGRAM(S): at 10:06

## 2017-10-21 RX ADMIN — Medication 50 MILLIGRAM(S): at 23:00

## 2017-10-21 RX ADMIN — Medication 25 MILLIGRAM(S): at 10:06

## 2017-10-21 RX ADMIN — Medication 1 MILLIGRAM(S): at 08:56

## 2017-10-21 RX ADMIN — Medication 3 MILLIGRAM(S): at 23:20

## 2017-10-21 RX ADMIN — Medication 1 EACH: at 13:03

## 2017-10-21 RX ADMIN — GABAPENTIN 300 MILLIGRAM(S): 400 CAPSULE ORAL at 20:43

## 2017-10-21 NOTE — CONSULT NOTE ADULT - CONSULT REASON
Asked by attending to see this 32 year old female with psych disorder now with exacerbation and ETOHism admitted to [facility] for complicated detox now transferred to [facility].

## 2017-10-21 NOTE — CONSULT NOTE ADULT - ASSESSMENT
32 year old female with psych disorder now with exacerbation with long alcohol history.  1.  Alcohol abuse:  s/p complicated detox at [facility].   Continue with folate and MVI  2.  Smoking:  Nicotine replacement.  3.  Iron Deficiency Anemia:  Iron replacement.  4.  Psych: as per attending. 32 year old female with psych disorder now with exacerbation with long alcohol history.  1.  Alcohol abuse:  s/p complicated detox at [facility].   Continue with folate and MVI  2.  Smoking:  Nicotine replacement.  3.  Iron Deficiency Anemia:  Iron replacement.  4.  ANEMIA:  CHECK B12 AND IRON  5.  Psych: as per attending.

## 2017-10-21 NOTE — CONSULT NOTE ADULT - SUBJECTIVE AND OBJECTIVE BOX
HPI:   32 year old female with psych disorder now with exacerbation.  Pt with a very long hx of alcohol abuse with multiple inpatient detox admissions admitted to [facility] 10/06/2017.  Pt drinking 3 pints of vodka daily for past four years.  Pt also with seizures.  Pt with vomiting and friend called EMS.  Pt admitted for detox and became over sedated.  Pt transferred to MICU.  Pt with possible drinking of hand sanitizers and cleaning fluids during hospitalization.  Pt now transferred to [facility].    +Anemia on Iron most likely from poor nutrition.  +Seizures    PAST MEDICAL & SURGICAL HISTORY:  Alcohol use disorder  No significant past surgical history      Review of Systems:   CONSTITUTIONAL: No fever, weight loss, or fatigue  EYES: No eye pain, visual disturbances, or discharge  ENMT:  No difficulty hearing, tinnitus, vertigo; No sinus or throat pain  NECK: No pain or stiffness  BREASTS: No pain, masses, or nipple discharge  RESPIRATORY: No cough, wheezing, chills or hemoptysis; No shortness of breath  CARDIOVASCULAR: No chest pain, palpitations, dizziness, or leg swelling  GASTROINTESTINAL: No abdominal or epigastric pain. No nausea, vomiting, or hematemesis; No diarrhea or constipation. No melena or hematochezia.  GENITOURINARY: No dysuria, frequency, hematuria, or incontinence  NEUROLOGICAL: No headaches, memory loss, loss of strength, numbness, or tremors  SKIN: No itching, burning, rashes, or lesions   LYMPH NODES: No enlarged glands  ENDOCRINE: No heat or cold intolerance; No hair loss  MUSCULOSKELETAL: No joint pain or swelling; No muscle, back, or extremity pain  HEME/LYMPH: No easy bruising, or bleeding gums  ALLERY AND IMMUNOLOGIC: No hives or eczema    Allergies    No Known Allergies    Social History:  +CIGS, +ETOH, -DRUGS.    FAMILY HISTORY:  Family history of alcohol abuse (Father)      MEDICATIONS  (STANDING):  busPIRone 7.5 milliGRAM(s) Oral two times a day  ferrous    sulfate 325 milliGRAM(s) Oral daily  folic acid 1 milliGRAM(s) Oral daily  gabapentin 300 milliGRAM(s) Oral three times a day  melatonin. 3 milliGRAM(s) Oral at bedtime  multivitamin 1 Tablet(s) Oral daily  topiramate 100 milliGRAM(s) Oral two times a day  traZODone 100 milliGRAM(s) Oral at bedtime  venlafaxine  milliGRAM(s) Oral daily    MEDICATIONS  (PRN):  diphenhydrAMINE   Injectable 50 milliGRAM(s) IntraMuscular once PRN agitation  haloperidol     Tablet 2 milliGRAM(s) Oral every 6 hours PRN agitation  haloperidol    Injectable 2 milliGRAM(s) IntraMuscular once PRN agitation  hydrOXYzine hydrochloride 25 milliGRAM(s) Oral every 6 hours PRN Anxiety  ibuprofen  Tablet 400 milliGRAM(s) Oral two times a day PRN moderate pain  LORazepam   Injectable 2 milliGRAM(s) IntraMuscular once PRN Agitation  nicotine  Polacrilex Gum 2 milliGRAM(s) Oral every 3 hours PRN nicotine craving    VS:  97.9  105/70 79    PHYSICAL EXAM:  GENERAL: NAD, well-developed  HEAD:  Atraumatic, Normocephalic  EYES: EOMI, conjunctiva and sclera clear  NECK: Supple, No JVD  CHEST/LUNG: Clear to auscultation bilaterally; No wheeze  HEART: Regular rate and rhythm; No murmurs, rubs, or gallops  ABDOMEN: Soft, Nontender, Nondistended; Bowel sounds present  EXTREMITIES:   No clubbing, cyanosis, or edema  NEUROLOGY: non-focal  SKIN: No rashes or lesions    LABS:  10/18/2017  WBC 10.1  H/H  10.1/31.4 141   K 3.6  CO2 22 GLUC 86 BUN 10 CR 0.76  CA 8.8 PROT 7.2 ALB 4.2 ALK PHOS 44 SGOT 25 SGPT17    10/15/2017  IRON 18    EKG:  10/18/2017   NSR WNL QTC .453    RADIOLOGY & ADDITIONAL TESTS:      Care Discussed with Consultants/Other Providers:  ATTENDING AND STAFF. HPI:   32 year old female with psych disorder now with exacerbation.  Pt with a very long hx of alcohol abuse with multiple inpatient detox admissions admitted to [facility] 10/06/2017.  Pt drinking 3 pints of vodka daily for past four years.  Pt also with seizures.  Pt with vomiting and friend called EMS.  Pt admitted for detox and became over sedated.  Pt transferred to MICU.  Pt with possible drinking of hand sanitizers and cleaning fluids during hospitalization.  Pt now transferred to [facility].    +Anemia on Iron most likely from poor nutrition.  +Seizures    PAST MEDICAL & SURGICAL HISTORY:  Alcohol use disorder  No significant past surgical history      Review of Systems:   CONSTITUTIONAL: No fever, weight loss, or fatigue  EYES: No eye pain, visual disturbances, or discharge  ENMT:  No difficulty hearing, tinnitus, vertigo; No sinus or throat pain  NECK: No pain or stiffness  BREASTS: No pain, masses, or nipple discharge  RESPIRATORY: No cough, wheezing, chills or hemoptysis; No shortness of breath  CARDIOVASCULAR: No chest pain, palpitations, dizziness, or leg swelling  GASTROINTESTINAL: No abdominal or epigastric pain. No nausea, vomiting, or hematemesis; No diarrhea or constipation. No melena or hematochezia.  GENITOURINARY: No dysuria, frequency, hematuria, or incontinence  NEUROLOGICAL: No headaches, memory loss, loss of strength, numbness, or tremors  SKIN: No itching, burning, rashes, or lesions   LYMPH NODES: No enlarged glands  ENDOCRINE: No heat or cold intolerance; No hair loss  MUSCULOSKELETAL: No joint pain or swelling; No muscle, back, or extremity pain  HEME/LYMPH: No easy bruising, or bleeding gums  ALLERY AND IMMUNOLOGIC: No hives or eczema    Allergies    No Known Allergies    Social History:  +CIGS, +ETOH, -DRUGS.    FAMILY HISTORY:  Family history of alcohol abuse (Father)      MEDICATIONS  (STANDING):  busPIRone 7.5 milliGRAM(s) Oral two times a day  ferrous    sulfate 325 milliGRAM(s) Oral daily  folic acid 1 milliGRAM(s) Oral daily  gabapentin 300 milliGRAM(s) Oral three times a day  melatonin. 3 milliGRAM(s) Oral at bedtime  multivitamin 1 Tablet(s) Oral daily  topiramate 100 milliGRAM(s) Oral two times a day  traZODone 100 milliGRAM(s) Oral at bedtime  venlafaxine  milliGRAM(s) Oral daily    MEDICATIONS  (PRN):  diphenhydrAMINE   Injectable 50 milliGRAM(s) IntraMuscular once PRN agitation  haloperidol     Tablet 2 milliGRAM(s) Oral every 6 hours PRN agitation  haloperidol    Injectable 2 milliGRAM(s) IntraMuscular once PRN agitation  hydrOXYzine hydrochloride 25 milliGRAM(s) Oral every 6 hours PRN Anxiety  ibuprofen  Tablet 400 milliGRAM(s) Oral two times a day PRN moderate pain  LORazepam   Injectable 2 milliGRAM(s) IntraMuscular once PRN Agitation  nicotine  Polacrilex Gum 2 milliGRAM(s) Oral every 3 hours PRN nicotine craving    VS:  97.9  105/70 79  OXYGEN SAT 99%    PHYSICAL EXAM:  GENERAL: NAD, well-developed  HEAD:  Atraumatic, Normocephalic  EYES: EOMI, conjunctiva and sclera clear  NECK: Supple, No JVD  CHEST/LUNG: Clear to auscultation bilaterally; No wheeze  HEART: Regular rate and rhythm; No murmurs, rubs, or gallops  ABDOMEN: Soft, Nontender, Nondistended; Bowel sounds present  EXTREMITIES:   No clubbing, cyanosis, or edema  NEUROLOGY: non-focal  SKIN: No rashes or lesions    LABS:  10/18/2017  WBC 10.1  H/H  10.1/31.4 141   K 3.6  CO2 22 GLUC 86 BUN 10 CR 0.76  CA 8.8 PROT 7.2 ALB 4.2 ALK PHOS 44 SGOT 25 SGPT17    10/15/2017  IRON 18    EKG:  10/18/2017   NSR WNL QTC .453        Care Discussed with Consultants/Other Providers:  ATTENDING AND STAFF.

## 2017-10-22 LAB
FOLATE SERPL-MCNC: 19.9 NG/ML — SIGNIFICANT CHANGE UP (ref 4.7–20)
VIT B12 SERPL-MCNC: 422 PG/ML — SIGNIFICANT CHANGE UP (ref 200–900)

## 2017-10-22 PROCEDURE — 99232 SBSQ HOSP IP/OBS MODERATE 35: CPT

## 2017-10-22 RX ORDER — NICOTINE POLACRILEX 2 MG
1 GUM BUCCAL EVERY 4 HOURS
Qty: 0 | Refills: 0 | Status: DISCONTINUED | OUTPATIENT
Start: 2017-10-22 | End: 2017-10-27

## 2017-10-22 RX ADMIN — Medication 600 MILLIGRAM(S): at 06:25

## 2017-10-22 RX ADMIN — Medication 1 TABLET(S): at 08:56

## 2017-10-22 RX ADMIN — Medication 100 MILLIGRAM(S): at 21:57

## 2017-10-22 RX ADMIN — GABAPENTIN 300 MILLIGRAM(S): 400 CAPSULE ORAL at 21:57

## 2017-10-22 RX ADMIN — Medication 100 MILLIGRAM(S): at 08:56

## 2017-10-22 RX ADMIN — Medication 1 EACH: at 06:00

## 2017-10-22 RX ADMIN — Medication 150 MILLIGRAM(S): at 08:56

## 2017-10-22 RX ADMIN — GABAPENTIN 300 MILLIGRAM(S): 400 CAPSULE ORAL at 08:56

## 2017-10-22 RX ADMIN — Medication 325 MILLIGRAM(S): at 08:56

## 2017-10-22 RX ADMIN — Medication 600 MILLIGRAM(S): at 13:32

## 2017-10-22 RX ADMIN — Medication 1 EACH: at 12:32

## 2017-10-22 RX ADMIN — Medication 600 MILLIGRAM(S): at 18:37

## 2017-10-22 RX ADMIN — Medication 1 EACH: at 17:33

## 2017-10-22 RX ADMIN — Medication 50 MILLIGRAM(S): at 12:10

## 2017-10-22 RX ADMIN — Medication 600 MILLIGRAM(S): at 12:09

## 2017-10-22 RX ADMIN — Medication 1 MILLIGRAM(S): at 08:56

## 2017-10-22 RX ADMIN — Medication 600 MILLIGRAM(S): at 06:00

## 2017-10-22 RX ADMIN — GABAPENTIN 300 MILLIGRAM(S): 400 CAPSULE ORAL at 13:09

## 2017-10-23 PROCEDURE — 99232 SBSQ HOSP IP/OBS MODERATE 35: CPT

## 2017-10-23 RX ORDER — VENLAFAXINE HCL 75 MG
225 CAPSULE, EXT RELEASE 24 HR ORAL DAILY
Qty: 0 | Refills: 0 | Status: DISCONTINUED | OUTPATIENT
Start: 2017-10-24 | End: 2017-10-27

## 2017-10-23 RX ADMIN — Medication 1 EACH: at 03:45

## 2017-10-23 RX ADMIN — Medication 1 MILLIGRAM(S): at 08:40

## 2017-10-23 RX ADMIN — Medication 600 MILLIGRAM(S): at 14:10

## 2017-10-23 RX ADMIN — Medication 50 MILLIGRAM(S): at 03:45

## 2017-10-23 RX ADMIN — Medication 3 MILLIGRAM(S): at 21:19

## 2017-10-23 RX ADMIN — GABAPENTIN 300 MILLIGRAM(S): 400 CAPSULE ORAL at 08:40

## 2017-10-23 RX ADMIN — Medication 600 MILLIGRAM(S): at 04:23

## 2017-10-23 RX ADMIN — Medication 1 TABLET(S): at 08:40

## 2017-10-23 RX ADMIN — Medication 600 MILLIGRAM(S): at 13:10

## 2017-10-23 RX ADMIN — GABAPENTIN 300 MILLIGRAM(S): 400 CAPSULE ORAL at 21:19

## 2017-10-23 RX ADMIN — Medication 150 MILLIGRAM(S): at 08:40

## 2017-10-23 RX ADMIN — Medication 100 MILLIGRAM(S): at 08:40

## 2017-10-23 RX ADMIN — Medication 100 MILLIGRAM(S): at 21:19

## 2017-10-23 RX ADMIN — Medication 1 EACH: at 11:27

## 2017-10-23 RX ADMIN — Medication 325 MILLIGRAM(S): at 08:40

## 2017-10-23 RX ADMIN — GABAPENTIN 300 MILLIGRAM(S): 400 CAPSULE ORAL at 13:10

## 2017-10-24 PROCEDURE — 99232 SBSQ HOSP IP/OBS MODERATE 35: CPT

## 2017-10-24 RX ORDER — ACAMPROSATE CALCIUM 333 MG/1
666 TABLET, DELAYED RELEASE ORAL THREE TIMES A DAY
Qty: 0 | Refills: 0 | Status: DISCONTINUED | OUTPATIENT
Start: 2017-10-25 | End: 2017-10-27

## 2017-10-24 RX ADMIN — Medication 1 EACH: at 12:25

## 2017-10-24 RX ADMIN — Medication 100 MILLIGRAM(S): at 21:47

## 2017-10-24 RX ADMIN — Medication 1 EACH: at 05:08

## 2017-10-24 RX ADMIN — Medication 1 MILLIGRAM(S): at 08:02

## 2017-10-24 RX ADMIN — Medication 325 MILLIGRAM(S): at 08:02

## 2017-10-24 RX ADMIN — Medication 1 EACH: at 18:20

## 2017-10-24 RX ADMIN — Medication 3 MILLIGRAM(S): at 21:31

## 2017-10-24 RX ADMIN — Medication 225 MILLIGRAM(S): at 08:02

## 2017-10-24 RX ADMIN — Medication 600 MILLIGRAM(S): at 05:54

## 2017-10-24 RX ADMIN — Medication 100 MILLIGRAM(S): at 08:02

## 2017-10-24 RX ADMIN — Medication 1 TABLET(S): at 08:02

## 2017-10-24 RX ADMIN — GABAPENTIN 300 MILLIGRAM(S): 400 CAPSULE ORAL at 08:02

## 2017-10-24 RX ADMIN — GABAPENTIN 300 MILLIGRAM(S): 400 CAPSULE ORAL at 21:31

## 2017-10-24 RX ADMIN — GABAPENTIN 300 MILLIGRAM(S): 400 CAPSULE ORAL at 12:59

## 2017-10-24 RX ADMIN — Medication 600 MILLIGRAM(S): at 20:00

## 2017-10-25 PROCEDURE — 99232 SBSQ HOSP IP/OBS MODERATE 35: CPT

## 2017-10-25 RX ADMIN — ACAMPROSATE CALCIUM 666 MILLIGRAM(S): 333 TABLET, DELAYED RELEASE ORAL at 10:03

## 2017-10-25 RX ADMIN — Medication 1 TABLET(S): at 07:58

## 2017-10-25 RX ADMIN — Medication 100 MILLIGRAM(S): at 07:58

## 2017-10-25 RX ADMIN — Medication 100 MILLIGRAM(S): at 21:14

## 2017-10-25 RX ADMIN — Medication 600 MILLIGRAM(S): at 03:05

## 2017-10-25 RX ADMIN — GABAPENTIN 300 MILLIGRAM(S): 400 CAPSULE ORAL at 07:58

## 2017-10-25 RX ADMIN — Medication 600 MILLIGRAM(S): at 04:43

## 2017-10-25 RX ADMIN — Medication 600 MILLIGRAM(S): at 14:01

## 2017-10-25 RX ADMIN — Medication 325 MILLIGRAM(S): at 07:58

## 2017-10-25 RX ADMIN — GABAPENTIN 300 MILLIGRAM(S): 400 CAPSULE ORAL at 14:01

## 2017-10-25 RX ADMIN — Medication 1 MILLIGRAM(S): at 07:58

## 2017-10-25 RX ADMIN — Medication 1 EACH: at 07:50

## 2017-10-25 RX ADMIN — Medication 600 MILLIGRAM(S): at 04:21

## 2017-10-25 RX ADMIN — Medication 3 MILLIGRAM(S): at 23:20

## 2017-10-25 RX ADMIN — Medication 100 MILLIGRAM(S): at 23:20

## 2017-10-25 RX ADMIN — GABAPENTIN 300 MILLIGRAM(S): 400 CAPSULE ORAL at 21:14

## 2017-10-25 RX ADMIN — ACAMPROSATE CALCIUM 666 MILLIGRAM(S): 333 TABLET, DELAYED RELEASE ORAL at 21:13

## 2017-10-25 RX ADMIN — ACAMPROSATE CALCIUM 666 MILLIGRAM(S): 333 TABLET, DELAYED RELEASE ORAL at 14:01

## 2017-10-25 RX ADMIN — Medication 225 MILLIGRAM(S): at 07:58

## 2017-10-26 PROCEDURE — 99232 SBSQ HOSP IP/OBS MODERATE 35: CPT

## 2017-10-26 RX ORDER — TOPIRAMATE 25 MG
1 TABLET ORAL
Qty: 60 | Refills: 0 | OUTPATIENT
Start: 2017-10-26 | End: 2017-11-25

## 2017-10-26 RX ORDER — VENLAFAXINE HCL 75 MG
3 CAPSULE, EXT RELEASE 24 HR ORAL
Qty: 90 | Refills: 0 | OUTPATIENT
Start: 2017-10-26 | End: 2017-11-25

## 2017-10-26 RX ORDER — ACAMPROSATE CALCIUM 333 MG/1
2 TABLET, DELAYED RELEASE ORAL
Qty: 180 | Refills: 0 | OUTPATIENT
Start: 2017-10-26 | End: 2017-11-25

## 2017-10-26 RX ORDER — GABAPENTIN 400 MG/1
1 CAPSULE ORAL
Qty: 90 | Refills: 0 | OUTPATIENT
Start: 2017-10-26 | End: 2017-11-25

## 2017-10-26 RX ORDER — TRAZODONE HCL 50 MG
1 TABLET ORAL
Qty: 30 | Refills: 0 | OUTPATIENT
Start: 2017-10-26 | End: 2017-11-25

## 2017-10-26 RX ADMIN — Medication 1 EACH: at 18:23

## 2017-10-26 RX ADMIN — ACAMPROSATE CALCIUM 666 MILLIGRAM(S): 333 TABLET, DELAYED RELEASE ORAL at 08:31

## 2017-10-26 RX ADMIN — ACAMPROSATE CALCIUM 666 MILLIGRAM(S): 333 TABLET, DELAYED RELEASE ORAL at 21:49

## 2017-10-26 RX ADMIN — Medication 600 MILLIGRAM(S): at 07:02

## 2017-10-26 RX ADMIN — GABAPENTIN 300 MILLIGRAM(S): 400 CAPSULE ORAL at 08:31

## 2017-10-26 RX ADMIN — ACAMPROSATE CALCIUM 666 MILLIGRAM(S): 333 TABLET, DELAYED RELEASE ORAL at 12:37

## 2017-10-26 RX ADMIN — Medication 3 MILLIGRAM(S): at 21:44

## 2017-10-26 RX ADMIN — Medication 1 TABLET(S): at 08:31

## 2017-10-26 RX ADMIN — GABAPENTIN 300 MILLIGRAM(S): 400 CAPSULE ORAL at 21:44

## 2017-10-26 RX ADMIN — GABAPENTIN 300 MILLIGRAM(S): 400 CAPSULE ORAL at 12:37

## 2017-10-26 RX ADMIN — Medication 2 MILLIGRAM(S): at 06:18

## 2017-10-26 RX ADMIN — Medication 325 MILLIGRAM(S): at 08:31

## 2017-10-26 RX ADMIN — Medication 225 MILLIGRAM(S): at 08:31

## 2017-10-26 RX ADMIN — Medication 100 MILLIGRAM(S): at 21:45

## 2017-10-26 RX ADMIN — Medication 100 MILLIGRAM(S): at 08:31

## 2017-10-26 RX ADMIN — Medication 600 MILLIGRAM(S): at 21:00

## 2017-10-26 RX ADMIN — Medication 600 MILLIGRAM(S): at 06:18

## 2017-10-26 RX ADMIN — Medication 1 MILLIGRAM(S): at 08:31

## 2017-10-27 VITALS — OXYGEN SATURATION: 100 % | RESPIRATION RATE: 17 BRPM | TEMPERATURE: 97 F

## 2017-10-27 PROCEDURE — 99238 HOSP IP/OBS DSCHRG MGMT 30/<: CPT

## 2017-10-27 RX ADMIN — GABAPENTIN 300 MILLIGRAM(S): 400 CAPSULE ORAL at 10:08

## 2017-10-27 RX ADMIN — Medication 1 EACH: at 12:00

## 2017-10-27 RX ADMIN — Medication 1 EACH: at 05:58

## 2017-10-27 RX ADMIN — Medication 225 MILLIGRAM(S): at 10:09

## 2017-10-27 RX ADMIN — ACAMPROSATE CALCIUM 666 MILLIGRAM(S): 333 TABLET, DELAYED RELEASE ORAL at 13:06

## 2017-10-27 RX ADMIN — Medication 100 MILLIGRAM(S): at 10:09

## 2017-10-27 RX ADMIN — Medication 1 TABLET(S): at 10:09

## 2017-10-27 RX ADMIN — Medication 1 MILLIGRAM(S): at 10:08

## 2017-10-27 RX ADMIN — GABAPENTIN 300 MILLIGRAM(S): 400 CAPSULE ORAL at 13:06

## 2017-10-27 RX ADMIN — Medication 600 MILLIGRAM(S): at 05:58

## 2017-10-27 RX ADMIN — Medication 600 MILLIGRAM(S): at 13:07

## 2017-10-27 RX ADMIN — Medication 325 MILLIGRAM(S): at 10:08

## 2017-10-27 RX ADMIN — ACAMPROSATE CALCIUM 666 MILLIGRAM(S): 333 TABLET, DELAYED RELEASE ORAL at 10:08

## 2017-10-31 ENCOUNTER — OUTPATIENT (OUTPATIENT)
Dept: OUTPATIENT SERVICES | Facility: HOSPITAL | Age: 32
LOS: 1 days | Discharge: HOME | End: 2017-10-31

## 2017-11-01 DIAGNOSIS — F10.20 ALCOHOL DEPENDENCE, UNCOMPLICATED: ICD-10-CM

## 2018-07-27 NOTE — BEHAVIORAL HEALTH ASSESSMENT NOTE - OTHER PAST PSYCHIATRIC HISTORY (INCLUDE DETAILS REGARDING ONSET, COURSE OF ILLNESS, INPATIENT/OUTPATIENT TREATMENT)
Reviewed with pt.  She would like to observe  Recommend 6 mo fu with imaging prior  
Patient with >10 inpatient psychiatric hospitalizations last at [facility] in 1/17.  Currently does not have an outpt provider.  Multiple reported suicide attempts and self injurious behaviors

## 2019-01-16 PROBLEM — F10.99 ALCOHOL USE, UNSPECIFIED WITH UNSPECIFIED ALCOHOL-INDUCED DISORDER: Chronic | Status: ACTIVE | Noted: 2017-10-06

## 2019-01-28 ENCOUNTER — APPOINTMENT (OUTPATIENT)
Dept: OBGYN | Facility: CLINIC | Age: 34
End: 2019-01-28

## 2019-02-04 ENCOUNTER — TRANSCRIPTION ENCOUNTER (OUTPATIENT)
Age: 34
End: 2019-02-04

## 2019-02-15 ENCOUNTER — TRANSCRIPTION ENCOUNTER (OUTPATIENT)
Age: 34
End: 2019-02-15

## 2019-02-20 ENCOUNTER — APPOINTMENT (OUTPATIENT)
Dept: OBGYN | Facility: CLINIC | Age: 34
End: 2019-02-20

## 2019-05-21 ENCOUNTER — TRANSCRIPTION ENCOUNTER (OUTPATIENT)
Age: 34
End: 2019-05-21

## 2019-06-05 NOTE — PROVIDER CONTACT NOTE (MEDICATION) - ASSESSMENT
Pt vss; 1:1 maintained; pt states she has a headache and knee pain 7/10 from past fall; requesting tylenol
patient stable in bed.
pt VSS, pt a and o x4, pt denies SOB, denies chest pain, denies n/v, denies headache
pt a and o x4, pt denies SOB, denies chest pain, denies n/v, denies headache, denies pain/ discomfort
0

## 2019-07-23 ENCOUNTER — TRANSCRIPTION ENCOUNTER (OUTPATIENT)
Age: 34
End: 2019-07-23

## 2019-10-15 ENCOUNTER — TRANSCRIPTION ENCOUNTER (OUTPATIENT)
Age: 34
End: 2019-10-15

## 2019-12-02 NOTE — PROVIDER CONTACT NOTE (OTHER) - ACTION/TREATMENT ORDERED:
pt brought back to room, safety maintained. MD notified and aware, will come assess pt, continue to monitor anel

## 2020-07-30 NOTE — PROGRESS NOTE BEHAVIORAL HEALTH - AFFECT RANGE
Full
Full
Constricted
Ilumya Counseling: I discussed with the patient the risks of tildrakizumab including but not limited to immunosuppression, malignancy, posterior leukoencephalopathy syndrome, and serious infections.  The patient understands that monitoring is required including a PPD at baseline and must alert us or the primary physician if symptoms of infection or other concerning signs are noted.
Full
Constricted
Full
Full
Other

## 2021-03-02 NOTE — PROGRESS NOTE ADULT - PROBLEM/PLAN-1
DISPLAY PLAN FREE TEXT
decreased blanche/decreased step length/decreased stride length/increased stride width/decreased weight-shifting ability
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
## 2021-08-18 ENCOUNTER — EMERGENCY (EMERGENCY)
Facility: HOSPITAL | Age: 36
LOS: 0 days | Discharge: HOME | End: 2021-08-18
Attending: STUDENT IN AN ORGANIZED HEALTH CARE EDUCATION/TRAINING PROGRAM
Payer: MEDICARE

## 2021-08-18 VITALS
HEIGHT: 63 IN | DIASTOLIC BLOOD PRESSURE: 71 MMHG | RESPIRATION RATE: 18 BRPM | WEIGHT: 139.99 LBS | SYSTOLIC BLOOD PRESSURE: 116 MMHG | OXYGEN SATURATION: 99 % | HEART RATE: 85 BPM | TEMPERATURE: 98 F

## 2021-08-18 DIAGNOSIS — G89.29 OTHER CHRONIC PAIN: ICD-10-CM

## 2021-08-18 DIAGNOSIS — Z04.3 ENCOUNTER FOR EXAMINATION AND OBSERVATION FOLLOWING OTHER ACCIDENT: ICD-10-CM

## 2021-08-18 DIAGNOSIS — Z88.8 ALLERGY STATUS TO OTHER DRUGS, MEDICAMENTS AND BIOLOGICAL SUBSTANCES: ICD-10-CM
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
## 2021-08-18 DIAGNOSIS — M54.9 DORSALGIA, UNSPECIFIED: ICD-10-CM

## 2021-08-18 DIAGNOSIS — R52 PAIN, UNSPECIFIED: ICD-10-CM

## 2021-08-18 DIAGNOSIS — F32.9 MAJOR DEPRESSIVE DISORDER, SINGLE EPISODE, UNSPECIFIED: ICD-10-CM

## 2021-08-18 DIAGNOSIS — T14.90XA INJURY, UNSPECIFIED, INITIAL ENCOUNTER: ICD-10-CM

## 2021-08-18 DIAGNOSIS — F41.9 ANXIETY DISORDER, UNSPECIFIED: ICD-10-CM

## 2021-08-18 DIAGNOSIS — G62.9 POLYNEUROPATHY, UNSPECIFIED: ICD-10-CM
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
## 2021-08-18 DIAGNOSIS — S09.90XA UNSPECIFIED INJURY OF HEAD, INITIAL ENCOUNTER: ICD-10-CM

## 2021-08-18 PROCEDURE — 99283 EMERGENCY DEPT VISIT LOW MDM: CPT

## 2021-08-18 RX ORDER — GABAPENTIN 400 MG/1
2 CAPSULE ORAL
Qty: 60 | Refills: 0
Start: 2021-08-18 | End: 2021-08-27

## 2021-08-18 RX ORDER — ACETAMINOPHEN 500 MG
975 TABLET ORAL ONCE
Refills: 0 | Status: COMPLETED | OUTPATIENT
Start: 2021-08-18 | End: 2021-08-18

## 2021-08-18 RX ADMIN — Medication 975 MILLIGRAM(S): at 22:11

## 2021-08-18 NOTE — ED PROVIDER NOTE - NSFOLLOWUPINSTRUCTIONS_ED_ALL_ED_FT
1) Please follow-up with your pain management doctor.  If you cannot follow-up with your doctor(s), please return to the ED for any urgent issues.  2) If you have any worsening of symptoms or any other concerns please return to the ED immediately.  3) Please continue taking your home medications as directed.  4) You may have been given a copy of your labs and/or imaging.  Please go over these with your primary care doctor.   5) Take 600mg Motrin (also called Advil or Ibuprofen) every 6 hours as needed for fever/pain/discomfort/swelling. You can take this with Tylenol 650 mg (also called acetaminophen) every 6 hours.   Don't use more than 3500mg of Tylenol in any 24-hour period. Make sure your other prescription/over-the-counter medications don't contain any Tylenol so you don't take too much.   If you have any stomach discomfort while taking Motrin, you can use TUMS or Pepcid or Zantac (these can all be bought without a prescription).   6) A prescription has been sent to your pharmacy. Please take it as directed.

## 2021-08-18 NOTE — ED PROVIDER NOTE - CLINICAL SUMMARY MEDICAL DECISION MAKING FREE TEXT BOX
36F looks well, ambulatory, asking for gabapentin refill until pain med doc returns from ?vacation. Pt ok for dc.

## 2021-08-18 NOTE — ED ADULT NURSE REASSESSMENT NOTE - NS ED NURSE REASSESS COMMENT FT1
Pt seen and discharged by MD prior to RN assessment. Pt A&O x3, breathing with ease, medicated per orders prior to d/c. Pt denies any further concerns, ambulated out of dept with steady gait.

## 2021-08-18 NOTE — ED PROVIDER NOTE - OBJECTIVE STATEMENT
36F hx depression/anxiety, neuropathy, chronic (back) pain, presents to ED s/p fall while helping a stranger to their feet. States someone had fallen near her while out and about and she went to bend down to help them off the ground, causing her to strain her back. Reports mild head strike against unknown object, no LOC.     Denies subsequent nausea/vomiting.

## 2021-08-18 NOTE — ED PROVIDER NOTE - NS_EDPROVIDERDISPOUSERTYPE_ED_A_ED
Assumed care this am from night shift RN. Patient awake and alert. Call bell and personal items within reach, bed locked in low position. Bed exit alarm armed. Hourly rounding in place.    Attending Attestation (For Attendings USE Only)...

## 2021-08-18 NOTE — ED PROVIDER NOTE - PATIENT PORTAL LINK FT
You can access the FollowMyHealth Patient Portal offered by [facility] by registering at the following website: [email or web address]. By joining [employer]’s FollowMyHealth portal, you will also be able to view your health information using other applications (apps) compatible with our system.

## 2021-08-18 NOTE — ED ADULT TRIAGE NOTE - CHIEF COMPLAINT QUOTE
c/o lower back pain, neck pain, head pain, and dizziness s/p falling while trying to help someone up. pt states she hit her head on the dresser. no LOC

## 2021-08-23 ENCOUNTER — EMERGENCY (EMERGENCY)
Facility: HOSPITAL | Age: 36
LOS: 1 days | Discharge: HOME | End: 2021-08-23
Attending: EMERGENCY MEDICINE | Admitting: EMERGENCY MEDICINE
Payer: MEDICARE

## 2021-08-23 VITALS
OXYGEN SATURATION: 95 % | RESPIRATION RATE: 14 BRPM | HEIGHT: 63 IN | TEMPERATURE: 98 F | HEART RATE: 62 BPM | SYSTOLIC BLOOD PRESSURE: 149 MMHG | DIASTOLIC BLOOD PRESSURE: 92 MMHG

## 2021-08-23 PROCEDURE — 99284 EMERGENCY DEPT VISIT MOD MDM: CPT | Mod: 25

## 2021-08-23 PROCEDURE — 93010 ELECTROCARDIOGRAM REPORT: CPT

## 2021-08-23 NOTE — ED PROVIDER NOTE - PHYSICAL EXAMINATION
General: well appearing, no acute distress, AOx3  Skin: no rash, no pallor  Head: normocephalic, atraumatic  Eyes: clear conjunctiva, EOMI  ENMT: airway patent, no nasal discharge  Cardiovascular: normal rate, normal rhythm, S1/S2  Pulmonary: clear to auscultation bilaterally, no rales, rhonchi, or wheeze  Abdomen: soft, nontender  Musculoskeletal: moving extremities well, no deformity, no c/t/l spine tenderness  Neuro: ambulatory, normal strength in extremities, CN II-XII grossly intact   Psych: normal mood, labile affect

## 2021-08-23 NOTE — ED PROVIDER NOTE - ATTENDING CONTRIBUTION TO CARE
Attending note:   After face to face evaluation of this patient, I concur with above noted hx, pe, and care plan for this patient.  Pace: 36 yof with medical history as noted above. Pt has multiple complaints. Pt states she was febrile earlier in the day and then syncopized while at work. Pt states this is due to her gabapentin withdrawal. Pt then showed a bottle with gabapentin in it filled three days prior. Pt also states that she requires a refill as her doctor is not able to see her. Pt refused to cooperative with PE and rest of history after she was told that long standing meds are not refilled in the ED. She can treat withdrawal inly when indicated clinically. Pt then became very irate, stated "this is not a controlled substance, you are treating me like a criminal, this was never controlled before and now you guys are just mistreating me." Pt stated she would leave without further workup. Pt was explained the risks of leaving without workup for fever and syncope, expresses desire to leave, aware of surroundings, vitals stable with no evidence of withdrawal. refusing to await paperwork.
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
## 2021-08-23 NOTE — ED PROVIDER NOTE - PROGRESS NOTE DETAILS
Joe Herndon, PGY-2- Spoke with Dr. Santos regarding patient [phone]. States patient does not come in to office visits. Does not recommend giving patient medication at this time. Will encourage patient to follow up for office visits and not to go to Emergency Departments to get Gabapentin and other medications filled. Joe Herndon, PGY-2- Attempted to further work up patient's syncope, advised patient to get labs, ekg, upreg for further evaluation, however, patient declines. She prefers to leave since she will not get her Gabapentin refilled in the Emergency Department. She was advised to f/u with her pain management specialist at the scheduled appointments. Patient left the emergency department prior to receiving discharge papers as she said she didn't want to wait for them. Patient displays capacity to make own healthcare decisions and understands risk of leaving with premature work up.

## 2021-08-23 NOTE — ED PROVIDER NOTE - PATIENT PORTAL LINK FT
You can access the FollowMyHealth Patient Portal offered by [facility] by registering at the following website: [email or web address]. By joining [employer]’s FollowMyHealth portal, you will also be able to view your health information using other applications (apps) compatible with our system.

## 2021-08-23 NOTE — ED PROVIDER NOTE - OBJECTIVE STATEMENT
36 year old female with a pmhx of depression/anxiety, neuropathy, chronic (back) pain, presents to ED for evaluation after syncopal episode that occurred prior to arrival. Pt reports she was at work, found to have a fever 101F, and then "passed out" she does not remember passing out, but her coworker told her she did. Patient states this has happened before when she runs out of her Gabapentin. She currently feels well, but wants her medication refilled. She is unsure last time the Gabapentin was filled, but doesn't have any left. No recent blurry vision, cough, cp, sob, abd pain, vomiting. Pt denies recreational drug or alcohol use.   Pain- Dr. Santos [phone]

## 2021-08-23 NOTE — ED PROVIDER NOTE - NS ED ROS FT
General: no fever, no chills, +syncope  Eyes: no vision changes, no eye pain  Cardiovascular: no chest pain, no edema  Respiratory: no cough, no shortness of breath  Gastrointestinal: no abdominal pain, no nausea, no vomiting, no diarrhea  Genitourinary: no dysuria, no hematuria  Musculoskeletal: no muscle pain, no joint pain  Skin: no rash, no lesions  Neuro: no numbness, no tingling,   Psych: no depression, no anxiety

## 2021-08-23 NOTE — ED PROVIDER NOTE - CLINICAL SUMMARY MEDICAL DECISION MAKING FREE TEXT BOX
Joe Herndon, PGY-2- 36 year old female with a pmhx of anxiety/depression, chronic pain, presenting for eval of syncopal episode. Pt seamingly not too concerned about syncopal episodes. Unwilling to get worked up for syncope as she was told she will not be getting the Gabapentin refilled. Her pain management specialist stated she has been inconsistent with attending appointments and does not advise refilling her medication. Patient made aware that further work up is warranted for the syncope, however, she decided to leave the ED without waiting for papers and wanted to go home. Pt has capacity to make own healthcare decisions and cannot be forced to stay in ED despite risks of leaving explained.

## 2021-08-23 NOTE — ED PROVIDER NOTE - NSICDXPASTMEDICALHX_GEN_ALL_CORE_FT
PAST MEDICAL HISTORY:  Alcohol use disorder     Anorexia     Chronic Gastric Ulcer with Bleeding     Clinical Depression     Post Traumatic Stress Disorder

## 2024-01-01 NOTE — PROGRESS NOTE BEHAVIORAL HEALTH - NSBHCONSULTOBS_PSY_A_CORE
Constant observation
Constant observation
-The cord will gradually dry up and fall off in 2-3 weeks.
Constant observation
Enhanced supervision
Constant observation

## 2024-04-05 NOTE — ED ADULT TRIAGE NOTE - TEMPERATURE IN FAHRENHEIT (DEGREES F)
From: Phuong Soriano  To: Brady Banuelos DO  Sent: 4/4/2024 11:59 PM EDT  Subject: New Prescription     I do need a new prescription for Xanax sent to [facility].   Thank you,  Phuong Soriano   
98.3

## 2024-04-11 NOTE — H&P ADULT - NSHPLABSRESULTS_GEN_ALL_CORE
See below from patient.  He is taking Atenolol twice daily and would like a new Rx sent in.    11.2   4.1   )-----------( 508      ( 05 Oct 2017 17:43 )             34.7   10-06    141  |  102  |  6<L>  ----------------------------<  84  3.7   |  28  |  0.53    Ca    8.2<L>      06 Oct 2017 06:56  Phos  2.5     10-06  Mg     2.8     10-06    TPro  6.1  /  Alb  3.9  /  TBili  0.3  /  DBili  0.1  /  AST  36  /  ALT  20  /  AlkPhos  40  10-06    Alcohol, Blood: 365: TOXIC CONCENTRATIONS (mg/dL):    Benzodiazepine, Urine: Positive (10.05.17 @ 17:43)

## 2025-01-25 NOTE — BEHAVIORAL HEALTH ASSESSMENT NOTE - NS ED BHA MED ROS CONSTITUTIONAL SYMPTOMS
Nursing Visit Note:  Nurse unable to get piv started after 3 attempts with ultrasound.  Pt and her mother are agreeable to rescheduling for 2/1/25    April Lawrence RN 1/25/2025  
Yes

## 2025-04-09 NOTE — PROGRESS NOTE ADULT - I WAS PHYSICALLY PRESENT FOR THE KEY PORTIONS OF THE EVALUATION AND MANAGEMENT (E/M) SERVICE PROVIDED.  I AGREE WITH THE ABOVE HISTORY, PHYSICAL, AND PLAN WHICH I HAVE REVIEWED AND EDITED WHERE APPROPRIATE
----- Message from Steph Lang PA-C sent at 3/26/2025  9:09 AM CDT -----  Regarding: RE: High Resolution Manometry  Okay to schedyle ESMO per marisol and SALLY Mike  ----- Message -----  From: Dang Savage, RN  Sent: 3/24/2025   3:32 PM CDT  To: Steph Lang PA-C  Subject: RE: High Resolution Manometry                    Hello,    Please review and advise if OK to proceed directly to ESMO. Thanks!  ----- Message -----  From: Patricia Ledesma MA  Sent: 3/24/2025   3:07 PM CDT  To: BRE Collins Nurse Msg Pool  Subject: High Resolution Manometry                        Hello,     Per Dr. Bello and SALLY Mike (Samir). Pt will need to be scheduled at [facility] for High Resolution Manometry. Are you able to assist in scheduling this? A referral has been placed.     Thank you,   CADEN Gomez  
2nd outgoing call placed to patient writer was able to (LVM) to offer ESMO.   
3rd attempt to reach pt to schedule ESMO, no answer, LVM for pt to call back to schedule.  
Need to schedule letter sent. Will defer message.  
Outgoing call placed to patient (line rung and rung and became busy.   
Statement Selected